# Patient Record
Sex: MALE | Race: WHITE | NOT HISPANIC OR LATINO | Employment: FULL TIME | URBAN - METROPOLITAN AREA
[De-identification: names, ages, dates, MRNs, and addresses within clinical notes are randomized per-mention and may not be internally consistent; named-entity substitution may affect disease eponyms.]

---

## 2017-01-06 ENCOUNTER — OFFICE VISIT (OUTPATIENT)
Dept: FAMILY MEDICINE | Facility: CLINIC | Age: 52
End: 2017-01-06
Payer: COMMERCIAL

## 2017-01-06 VITALS
DIASTOLIC BLOOD PRESSURE: 90 MMHG | WEIGHT: 201.94 LBS | RESPIRATION RATE: 16 BRPM | SYSTOLIC BLOOD PRESSURE: 138 MMHG | HEIGHT: 66 IN | HEART RATE: 70 BPM | BODY MASS INDEX: 32.45 KG/M2

## 2017-01-06 DIAGNOSIS — I10 ESSENTIAL HYPERTENSION: ICD-10-CM

## 2017-01-06 DIAGNOSIS — M50.00 CERVICAL DISC DISEASE WITH MYELOPATHY: ICD-10-CM

## 2017-01-06 DIAGNOSIS — E78.5 HYPERLIPIDEMIA, UNSPECIFIED HYPERLIPIDEMIA TYPE: Primary | ICD-10-CM

## 2017-01-06 LAB
ALT SERPL W/O P-5'-P-CCNC: 27 U/L
ANION GAP SERPL CALC-SCNC: 10 MMOL/L
BUN SERPL-MCNC: 20 MG/DL
CALCIUM SERPL-MCNC: 9.9 MG/DL
CHLORIDE SERPL-SCNC: 106 MMOL/L
CHOLEST/HDLC SERPL: 5.7 {RATIO}
CO2 SERPL-SCNC: 24 MMOL/L
CREAT SERPL-MCNC: 1.1 MG/DL
EST. GFR  (AFRICAN AMERICAN): >60 ML/MIN/1.73 M^2
EST. GFR  (NON AFRICAN AMERICAN): >60 ML/MIN/1.73 M^2
GLUCOSE SERPL-MCNC: 91 MG/DL
HDL/CHOLESTEROL RATIO: 17.4 %
HDLC SERPL-MCNC: 195 MG/DL
HDLC SERPL-MCNC: 34 MG/DL
LDLC SERPL CALC-MCNC: 136 MG/DL
NONHDLC SERPL-MCNC: 161 MG/DL
POTASSIUM SERPL-SCNC: 4.2 MMOL/L
SODIUM SERPL-SCNC: 140 MMOL/L
TRIGL SERPL-MCNC: 125 MG/DL

## 2017-01-06 PROCEDURE — 1159F MED LIST DOCD IN RCRD: CPT | Mod: S$GLB,,, | Performed by: FAMILY MEDICINE

## 2017-01-06 PROCEDURE — 80061 LIPID PANEL: CPT

## 2017-01-06 PROCEDURE — 3080F DIAST BP >= 90 MM HG: CPT | Mod: S$GLB,,, | Performed by: FAMILY MEDICINE

## 2017-01-06 PROCEDURE — 84460 ALANINE AMINO (ALT) (SGPT): CPT

## 2017-01-06 PROCEDURE — 36415 COLL VENOUS BLD VENIPUNCTURE: CPT | Mod: S$GLB,,, | Performed by: FAMILY MEDICINE

## 2017-01-06 PROCEDURE — 99999 PR PBB SHADOW E&M-EST. PATIENT-LVL III: CPT | Mod: PBBFAC,,, | Performed by: FAMILY MEDICINE

## 2017-01-06 PROCEDURE — 80048 BASIC METABOLIC PNL TOTAL CA: CPT

## 2017-01-06 PROCEDURE — 99214 OFFICE O/P EST MOD 30 MIN: CPT | Mod: 25,S$GLB,, | Performed by: FAMILY MEDICINE

## 2017-01-06 PROCEDURE — 3075F SYST BP GE 130 - 139MM HG: CPT | Mod: S$GLB,,, | Performed by: FAMILY MEDICINE

## 2017-01-06 RX ORDER — ATORVASTATIN CALCIUM 20 MG/1
20 TABLET, FILM COATED ORAL DAILY
Qty: 30 TABLET | Refills: 5 | Status: SHIPPED | OUTPATIENT
Start: 2017-01-06 | End: 2017-03-23 | Stop reason: SDUPTHER

## 2017-01-06 RX ORDER — PROPRANOLOL HYDROCHLORIDE 80 MG/1
80 CAPSULE, EXTENDED RELEASE ORAL DAILY
Qty: 30 CAPSULE | Refills: 5 | Status: SHIPPED | OUTPATIENT
Start: 2017-01-06 | End: 2017-03-23 | Stop reason: SDUPTHER

## 2017-01-06 RX ORDER — FENOFIBRIC ACID 135 MG/1
1 CAPSULE, DELAYED RELEASE ORAL DAILY
Qty: 30 CAPSULE | Refills: 5 | Status: SHIPPED | OUTPATIENT
Start: 2017-01-06 | End: 2017-03-23 | Stop reason: SDUPTHER

## 2017-01-06 RX ORDER — VALSARTAN 80 MG/1
80 TABLET ORAL DAILY
Qty: 30 TABLET | Refills: 5 | Status: SHIPPED | OUTPATIENT
Start: 2017-01-06 | End: 2017-03-23 | Stop reason: SDUPTHER

## 2017-01-06 NOTE — MR AVS SNAPSHOT
[address] [address]  Phone: [phone]  Fax: [phone]                  Joel Deluca   2017 11:00 AM   Office Visit    Description:  Male : 1965   Provider:  Otis Martinez MD   Department:  [facility]           Reason for Visit     Follow-up           Diagnoses this Visit        Comments    Hyperlipidemia, unspecified hyperlipidemia type    -  Primary     Essential hypertension         Cervical disc disease with myelopathy                To Do List           Future Appointments        Provider Department Dept Phone    2017 1:30 PM Otis Martinez MD [facility] [phone]      Goals (5 Years of Data)     None      Follow-Up and Disposition     Return in about 6 months (around 2017).       These Medications        Disp Refills Start End    valsartan (DIOVAN) 80 MG tablet 30 tablet 5 2017     Take 1 tablet (80 mg total) by mouth once daily. - Oral    Pharmacy: [place] [id number] [place] [address] Ph #: [phone]       atorvastatin (LIPITOR) 20 MG tablet 30 tablet 5 2017     Take 1 tablet (20 mg total) by mouth once daily. - Oral    Pharmacy: [place] [address] [place] [address] Ph #: [phone]       fenofibric acid (FIBRICOR) 135 mg CpDR 30 capsule 5 2017     Take 1 capsule (135 mg total) by mouth once daily. - Oral    Pharmacy: [place] [address] [place] [address] Ph #: [phone]       propranolol (INDERAL LA) 80 MG 24 hr capsule 30 capsule 2017     Take 1 capsule (80 mg total) by mouth once daily. - Oral    Pharmacy: [place] [address] [place] [address] Ph #: [phone]         [facility]     [facility] Care [facility] -  Assistance  Registered nurses in the [facility] Call Center provide clinical advisement, health education,  appointment booking, and other advisory services.  Call for this free service at [phone].             Medications           Message regarding Medications     Verify the changes and/or additions to your medication regime listed below are the same as discussed with your clinician today.  If any of these changes or additions are incorrect, please notify your healthcare provider.        CHANGE how you are taking these medications     Start Taking Instead of    valsartan (DIOVAN) 80 MG tablet valsartan (DIOVAN) 80 MG tablet    Dosage:  Take 1 tablet (80 mg total) by mouth once daily. Dosage:  TAKE ONE TABLET BY MOUTH ONE TIME DAILY    Reason for Change:  Reorder     atorvastatin (LIPITOR) 20 MG tablet atorvastatin (LIPITOR) 20 MG tablet    Dosage:  Take 1 tablet (20 mg total) by mouth once daily. Dosage:  TAKE ONE TABLET BY MOUTH ONE TIME DAILY    Reason for Change:  Reorder     fenofibric acid (FIBRICOR) 135 mg CpDR fenofibric acid (FIBRICOR) 135 mg CpDR    Dosage:  Take 1 capsule (135 mg total) by mouth once daily. Dosage:  TAKE ONE CAPSULE BY MOUTH DAILY    Reason for Change:  Reorder     propranolol (INDERAL LA) 80 MG 24 hr capsule propranolol (INDERAL LA) 80 MG 24 hr capsule    Dosage:  Take 1 capsule (80 mg total) by mouth once daily. Dosage:  TAKE ONE CAPSULE BY MOUTH ONE TIME DAILY     Reason for Change:  Reorder            Verify that the below list of medications is an accurate representation of the medications you are currently taking.  If none reported, the list may be blank. If incorrect, please contact your healthcare provider. Carry this list with you in case of emergency.           Current Medications     atorvastatin (LIPITOR) 20 MG tablet Take 1 tablet (20 mg total) by mouth once daily.    fenofibric acid (FIBRICOR) 135 mg CpDR Take 1 capsule (135 mg total) by mouth once daily.    propranolol (INDERAL LA) 80 MG 24 hr capsule Take 1 capsule (80 mg total) by mouth once daily.    valsartan (DIOVAN)  "80 MG tablet Take 1 tablet (80 mg total) by mouth once daily.           Clinical Reference Information           Vital Signs - Last Recorded  Most recent update: 1/6/2017 11:00 AM by Delmar Chu LPN    BP Pulse Resp Ht Wt BMI    (!) 138/90 (BP Location: Left arm, Patient Position: Sitting, BP Method: Manual) 70 16 5' 6" (1.676 m) 91.6 kg (201 lb 15.1 oz) 32.59 kg/m2      Blood Pressure          Most Recent Value    BP  (!)  138/90      Allergies as of 1/6/2017     No Known Allergies      Immunizations Administered on Date of Encounter - 1/6/2017     None      Orders Placed During Today's Visit     Future Labs/Procedures Expected by Expires    ALT (SGPT)  1/6/2017 1/6/2018    Basic metabolic panel  1/6/2017 1/6/2018    Lipid panel  1/6/2017 1/6/2018      "

## 2017-01-06 NOTE — PROGRESS NOTES
Pt is a 51 y.o. male who presents for check up for   Encounter Diagnoses   Name Primary?    Hyperlipidemia     Hypertension    . Doing well on current meds. Denies any side effects. Prevention is up to date.  He refuses the flu shot    HPI: White male here for checkup today. His blood pressure is running well. He denies side effects such as dry cough, lightheadedness, fatigue. His cholesterol medications were well tolerated. He does not have any side effects such as myalgias. He denies fever chills weight loss or weight gain. He denies chest pain.  Patient recently had blood work done through work.  Lab results were reviewed.  This will be scanned into the computer.  Having more neck pain.  History of cervical fusion.  Gets paresthesia in right hand. pain comes and goes.  Very painful when he lies down.  He is tolerating it and does not want anything done at this time.      ROS:   Gen.: No fever, chills, weight loss, weight gain  HEENT: No headaches, sinus congestion, sore throat, change in vision  CV: No chest pain  RESP: No shortness of breath, wheezing, cough  GI: No change in bowel habits, no diarrhea, no abdominal pain, no hematochezia  : No dysuria, frequency  MSK: Occasional left hip pain  NEURO: No numbness, weakness  ENDO: No polyuria, polydipsia, heat or cold intolerance    PMH, PSH, ALLERGIES, SH, FH reviewed in office note on 4/10/11  Medications reconciled in the nurse's notes    PHYSICAL EXAM;   Vitals:    01/06/17 1059   BP: (!) 138/90   Pulse: 70   Resp: 16     APPEARANCE: Well nourished, well developed, in no acute distress.   HEAD: Normocephalic, atraumatic.  EYES: PERRL. EOMI.   EARS: TM's intact. Light reflex normal. No retraction or perforation.  Very hard of hearing   NOSE: Mucosa pink. Airway clear.  MOUTH & THROAT: No tonsillar enlargement. No pharyngeal erythema or exudate. No stridor.  NECK: Supple.  No carotid bruits.  No JVD   CHEST: Lungs clear to auscultation.  CARDIOVASCULAR:  Normal S1, S2. No rubs, murmurs or gallops.  MUSCULOSKELETAL: No clubbing cyanosis or edema  SKIN: No rashes or pigmented moles.  NEUROLOGIC:    Cranial Nerves: II-XII grossly intact.   Motor: No focal deficits   MENTAL STATUS: Normal orientation to person, place and time, normal affect, normal flow thought, normal memory.    No results found for: LDLCALCTrig 339  HDL 27  LDLc 119    ASSESSMENT/PLAN:    HTN -   Encouraged patient to avoid table salt and try to follow a 2 g sodium diet  Continue propranolol 80 mg daily, Diovan 80 mg p.o. daily.  Walk 20 minutes per day.  Try to lose weight.    Dyslipidemia-  Low fat diet. Avoid sweets. A 10 pound weight loss by the next visit as a  good goal. Increase consumption of fruits and vegetables, fish and chicken.  Continue Lipitor 10 mg daily, Trilipix 135 mg p.o. daily.    Hyperlipidemia, unspecified hyperlipidemia type  -     ALT (SGPT); Future; Expected date: 1/6/17  -     Basic metabolic panel; Future; Expected date: 1/6/17  -     Lipid panel; Future; Expected date: 1/6/17  -     atorvastatin (LIPITOR) 20 MG tablet; Take 1 tablet (20 mg total) by mouth once daily.  Dispense: 30 tablet; Refill: 5  -     fenofibric acid (FIBRICOR) 135 mg CpDR; Take 1 capsule (135 mg total) by mouth once daily.  Dispense: 30 capsule; Refill: 5    Essential hypertension  -     valsartan (DIOVAN) 80 MG tablet; Take 1 tablet (80 mg total) by mouth once daily.  Dispense: 30 tablet; Refill: 5  -     propranolol (INDERAL LA) 80 MG 24 hr capsule; Take 1 capsule (80 mg total) by mouth once daily.  Dispense: 30 capsule; Refill: 5    Cervical disc disease with myelopathy      Next appointment will be in 6 months.

## 2017-03-23 DIAGNOSIS — E78.5 HYPERLIPIDEMIA, UNSPECIFIED HYPERLIPIDEMIA TYPE: ICD-10-CM

## 2017-03-23 DIAGNOSIS — I10 ESSENTIAL HYPERTENSION: ICD-10-CM

## 2017-03-24 RX ORDER — ATORVASTATIN CALCIUM 20 MG/1
TABLET, FILM COATED ORAL
Qty: 30 TABLET | Refills: 5 | Status: SHIPPED | OUTPATIENT
Start: 2017-03-24 | End: 2017-07-05 | Stop reason: SDUPTHER

## 2017-03-24 RX ORDER — VALSARTAN 80 MG/1
TABLET ORAL
Qty: 30 TABLET | Refills: 5 | Status: SHIPPED | OUTPATIENT
Start: 2017-03-24 | End: 2017-07-05 | Stop reason: SDUPTHER

## 2017-03-24 RX ORDER — PROPRANOLOL HYDROCHLORIDE 80 MG/1
CAPSULE, EXTENDED RELEASE ORAL
Qty: 30 CAPSULE | Refills: 5 | Status: SHIPPED | OUTPATIENT
Start: 2017-03-24 | End: 2017-07-05 | Stop reason: SDUPTHER

## 2017-03-24 RX ORDER — FENOFIBRIC ACID 135 MG/1
CAPSULE, DELAYED RELEASE ORAL
Qty: 30 CAPSULE | Refills: 5 | Status: SHIPPED | OUTPATIENT
Start: 2017-03-24 | End: 2017-07-05 | Stop reason: SDUPTHER

## 2017-05-23 ENCOUNTER — OFFICE VISIT (OUTPATIENT)
Dept: FAMILY MEDICINE | Facility: CLINIC | Age: 52
End: 2017-05-23
Payer: COMMERCIAL

## 2017-05-23 VITALS
SYSTOLIC BLOOD PRESSURE: 140 MMHG | HEIGHT: 66 IN | BODY MASS INDEX: 32.47 KG/M2 | WEIGHT: 202 LBS | DIASTOLIC BLOOD PRESSURE: 90 MMHG | HEART RATE: 68 BPM | RESPIRATION RATE: 20 BRPM

## 2017-05-23 DIAGNOSIS — H49.11 FOURTH NERVE PALSY, RIGHT: Primary | ICD-10-CM

## 2017-05-23 LAB
ALBUMIN SERPL BCP-MCNC: 4.2 G/DL
ALP SERPL-CCNC: 54 U/L
ALT SERPL W/O P-5'-P-CCNC: 24 U/L
ANION GAP SERPL CALC-SCNC: 11 MMOL/L
AST SERPL-CCNC: 22 U/L
BASOPHILS # BLD AUTO: 0.04 K/UL
BASOPHILS NFR BLD: 0.6 %
BILIRUB SERPL-MCNC: 0.4 MG/DL
BUN SERPL-MCNC: 19 MG/DL
CALCIUM SERPL-MCNC: 9.8 MG/DL
CHLORIDE SERPL-SCNC: 106 MMOL/L
CO2 SERPL-SCNC: 25 MMOL/L
CREAT SERPL-MCNC: 1.2 MG/DL
DIFFERENTIAL METHOD: ABNORMAL
EOSINOPHIL # BLD AUTO: 0.2 K/UL
EOSINOPHIL NFR BLD: 3.3 %
ERYTHROCYTE [DISTWIDTH] IN BLOOD BY AUTOMATED COUNT: 12.7 %
ERYTHROCYTE [SEDIMENTATION RATE] IN BLOOD BY WESTERGREN METHOD: 2 MM/HR
EST. GFR  (AFRICAN AMERICAN): >60 ML/MIN/1.73 M^2
EST. GFR  (NON AFRICAN AMERICAN): >60 ML/MIN/1.73 M^2
GLUCOSE SERPL-MCNC: 102 MG/DL
HCT VFR BLD AUTO: 38.7 %
HGB BLD-MCNC: 13.3 G/DL
LYMPHOCYTES # BLD AUTO: 3 K/UL
LYMPHOCYTES NFR BLD: 43.3 %
MCH RBC QN AUTO: 29.8 PG
MCHC RBC AUTO-ENTMCNC: 34.4 %
MCV RBC AUTO: 87 FL
MONOCYTES # BLD AUTO: 0.6 K/UL
MONOCYTES NFR BLD: 9.3 %
NEUTROPHILS # BLD AUTO: 3 K/UL
NEUTROPHILS NFR BLD: 43.5 %
PLATELET # BLD AUTO: 319 K/UL
PMV BLD AUTO: 9 FL
POTASSIUM SERPL-SCNC: 3.9 MMOL/L
PROT SERPL-MCNC: 7.8 G/DL
RBC # BLD AUTO: 4.47 M/UL
SODIUM SERPL-SCNC: 142 MMOL/L
WBC # BLD AUTO: 6.88 K/UL

## 2017-05-23 PROCEDURE — 85651 RBC SED RATE NONAUTOMATED: CPT

## 2017-05-23 PROCEDURE — 3080F DIAST BP >= 90 MM HG: CPT | Mod: S$GLB,,, | Performed by: FAMILY MEDICINE

## 2017-05-23 PROCEDURE — 99999 PR PBB SHADOW E&M-EST. PATIENT-LVL III: CPT | Mod: PBBFAC,,, | Performed by: FAMILY MEDICINE

## 2017-05-23 PROCEDURE — 1160F RVW MEDS BY RX/DR IN RCRD: CPT | Mod: S$GLB,,, | Performed by: FAMILY MEDICINE

## 2017-05-23 PROCEDURE — 3077F SYST BP >= 140 MM HG: CPT | Mod: S$GLB,,, | Performed by: FAMILY MEDICINE

## 2017-05-23 PROCEDURE — 99214 OFFICE O/P EST MOD 30 MIN: CPT | Mod: S$GLB,,, | Performed by: FAMILY MEDICINE

## 2017-05-23 PROCEDURE — 80053 COMPREHEN METABOLIC PANEL: CPT

## 2017-05-23 PROCEDURE — 36415 COLL VENOUS BLD VENIPUNCTURE: CPT | Mod: S$GLB,,, | Performed by: FAMILY MEDICINE

## 2017-05-23 PROCEDURE — 85025 COMPLETE CBC W/AUTO DIFF WBC: CPT

## 2017-05-23 NOTE — PROGRESS NOTES
Subjective:       Patient ID: Joel Deluca is a 52 y.o. male.    Chief Complaint: Diplopia (symptoms started on friday; vision is worse when pts wakes up)    Patient knows that he had double vision starting Friday.  When he becomes tired or fatigued worsens.  Looking at things up close causes double vision.  He covers one eye he can see normally.  He denies headaches, trouble swallowing, fever, chills.  He went to the eye doctor yesterday and was told he had a fourth nerve palsy.  He is here for further evaluation.      Review of Systems   Constitutional: Negative for activity change, chills, diaphoresis, fatigue, fever and unexpected weight change.   HENT: Positive for hearing loss. Negative for congestion, nosebleeds, postnasal drip, sinus pressure, sore throat, trouble swallowing and voice change.    Eyes: Positive for visual disturbance. Negative for photophobia.   Respiratory: Negative for apnea, cough, choking, chest tightness and shortness of breath.    Cardiovascular: Negative for chest pain, palpitations and leg swelling.   Gastrointestinal: Negative for abdominal pain and blood in stool.   Endocrine: Negative for cold intolerance, heat intolerance, polydipsia and polyphagia.   Genitourinary: Negative for decreased urine volume, difficulty urinating and dysuria.   Musculoskeletal: Negative for arthralgias, back pain and joint swelling.   Skin: Negative for color change, pallor and rash.   Neurological: Negative for dizziness, weakness, numbness and headaches.   Hematological: Negative for adenopathy. Does not bruise/bleed easily.   Psychiatric/Behavioral: Negative for behavioral problems, decreased concentration, dysphoric mood and sleep disturbance. The patient is not nervous/anxious.        Objective:      Vitals:    05/23/17 0900   BP: (!) 140/90   Pulse: 68   Resp: 20     Physical Exam   Constitutional: He is oriented to person, place, and time. He appears well-developed and well-nourished.   HENT:    Head: Normocephalic and atraumatic.   Right Ear: Tympanic membrane, external ear and ear canal normal.   Left Ear: Tympanic membrane, external ear and ear canal normal.   Nose: Nose normal.   Mouth/Throat: Oropharynx is clear and moist.   Eyes: Conjunctivae and EOM are normal. Pupils are equal, round, and reactive to light.   Fundoscopic exam:       The right eye shows no exudate and no papilledema.        The left eye shows no exudate and no papilledema.   Neck: Normal range of motion. Neck supple. No tracheal deviation present. No thyromegaly present.   Cardiovascular: Normal rate, regular rhythm, normal heart sounds and intact distal pulses.  Exam reveals no gallop.    No murmur heard.  Pulmonary/Chest: Effort normal and breath sounds normal.   Abdominal: Soft. Bowel sounds are normal. Hernia confirmed negative in the right inguinal area and confirmed negative in the left inguinal area.   Musculoskeletal: Normal range of motion. He exhibits no edema.   Neurological: He is alert and oriented to person, place, and time. He has normal reflexes.   Skin: Skin is warm and dry.   Psychiatric: He has a normal mood and affect. His behavior is normal. Judgment and thought content normal.       Assessment:       1. Fourth nerve palsy, right        Plan:   Joel was seen today for diplopia.    Diagnoses and all orders for this visit:    Fourth nerve palsy, right  I agree with the eye specialist.  It looks like a fourth nerve palsy even though I do not see it when he does eye range of motion.  This seems to worsen when he has to look at something close as I has to accommodate.  He seems to be improving already so hopefully this will be short-lived.  -     MRI Brain With Contrast; Future-knee to rule out any mass type lesion causing these symptoms.  -     Comprehensive metabolic panel; Future  -     CBC auto differential; Future  -     Sedimentation rate, manual; Future  -     Ambulatory referral to  Neurology    Monitor  Should get better slowly  Continue current medications.

## 2017-05-24 ENCOUNTER — TELEPHONE (OUTPATIENT)
Dept: FAMILY MEDICINE | Facility: CLINIC | Age: 52
End: 2017-05-24

## 2017-05-24 NOTE — TELEPHONE ENCOUNTER
----- Message from Otis Martinez MD sent at 5/24/2017  1:26 PM CDT -----  Tell patient that labs look good.

## 2017-05-26 ENCOUNTER — HOSPITAL ENCOUNTER (OUTPATIENT)
Dept: RADIOLOGY | Facility: HOSPITAL | Age: 52
Discharge: HOME | End: 2017-05-26
Attending: FAMILY MEDICINE
Payer: COMMERCIAL

## 2017-05-26 DIAGNOSIS — H49.11 FOURTH NERVE PALSY, RIGHT: ICD-10-CM

## 2017-05-26 PROCEDURE — 25500020 PHARM REV CODE 255: Performed by: FAMILY MEDICINE

## 2017-05-26 PROCEDURE — 70553 MRI BRAIN STEM W/O & W/DYE: CPT | Mod: TC

## 2017-05-26 PROCEDURE — 70553 MRI BRAIN STEM W/O & W/DYE: CPT | Mod: 26,,, | Performed by: RADIOLOGY

## 2017-05-26 PROCEDURE — A9585 GADOBUTROL INJECTION: HCPCS | Performed by: FAMILY MEDICINE

## 2017-05-26 RX ORDER — GADOBUTROL 604.72 MG/ML
9 INJECTION INTRAVENOUS
Status: COMPLETED | OUTPATIENT
Start: 2017-05-26 | End: 2017-05-26

## 2017-05-26 RX ADMIN — GADOBUTROL 9 ML: 604.72 INJECTION INTRAVENOUS at 12:05

## 2017-05-29 DIAGNOSIS — H49.11 FOURTH NERVE PALSY, RIGHT: ICD-10-CM

## 2017-05-29 DIAGNOSIS — D18.02 CAVERNOUS HEMANGIOMA OF BRAIN: Primary | ICD-10-CM

## 2017-05-29 NOTE — PROGRESS NOTES
I discussed the situation with the patient.  I talked to Dr. Nicole.  She thinks patient needs to see neurosurgery as soon as possible.  Referral made.  Need to call neurosurgery and get an appointment.

## 2017-05-31 ENCOUNTER — TELEPHONE (OUTPATIENT)
Dept: NEUROSURGERY | Facility: CLINIC | Age: 52
End: 2017-05-31

## 2017-05-31 ENCOUNTER — OFFICE VISIT (OUTPATIENT)
Dept: NEUROSURGERY | Facility: CLINIC | Age: 52
End: 2017-05-31
Payer: COMMERCIAL

## 2017-05-31 VITALS
BODY MASS INDEX: 32.35 KG/M2 | TEMPERATURE: 99 F | WEIGHT: 201.31 LBS | HEIGHT: 66 IN | SYSTOLIC BLOOD PRESSURE: 145 MMHG | HEART RATE: 57 BPM | DIASTOLIC BLOOD PRESSURE: 94 MMHG

## 2017-05-31 DIAGNOSIS — I10 ESSENTIAL HYPERTENSION: ICD-10-CM

## 2017-05-31 DIAGNOSIS — M50.00 CERVICAL DISC DISEASE WITH MYELOPATHY: ICD-10-CM

## 2017-05-31 DIAGNOSIS — M50.00 CERVICAL DISC DISEASE WITH MYELOPATHY: Primary | ICD-10-CM

## 2017-05-31 DIAGNOSIS — Q28.3 CEREBRAL CAVERNOUS MALFORMATION: Primary | ICD-10-CM

## 2017-05-31 PROCEDURE — 99999 PR PBB SHADOW E&M-EST. PATIENT-LVL III: CPT | Mod: PBBFAC,,, | Performed by: NEUROLOGICAL SURGERY

## 2017-05-31 PROCEDURE — 99204 OFFICE O/P NEW MOD 45 MIN: CPT | Mod: S$GLB,,, | Performed by: NEUROLOGICAL SURGERY

## 2017-05-31 NOTE — PROGRESS NOTES
"History & Physical      05/31/2017    Chief Complaint   Patient presents with    Consult       History of Present Illness:  Joel Deluca is a 52 y.o. patient with history of a 4th nerve palsy.  Patient is referred to me by Dr. Otis Martinez.  Patient reports that two weeks ago he woke up with double vision and had a feeling of "being wet" on his right hemibody.  Patient denies weakness, speech problems, nausea, vomit, trouble with his gait and hearing loss. 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     Review of patient's allergies indicates:  No Known Allergies    Current Outpatient Prescriptions   Medication Sig Dispense Refill    atorvastatin (LIPITOR) 20 MG tablet TAKE 1 TABLET BY MOUTH EVERY DAY 30 tablet 5    fenofibric acid (FIBRICOR) 135 mg CpDR TAKE ONE CAPSULE BY MOUTH EVERY DAY 30 capsule 5    propranolol (INDERAL LA) 80 MG 24 hr capsule TAKE ONE CAPSULE BY MOUTH EVERY DAY 30 capsule 5    valsartan (DIOVAN) 80 MG tablet TAKE 1 TABLET BY MOUTH EVERY DAY 30 tablet 5     No current facility-administered medications for this visit.        Past Medical History:   Diagnosis Date    Hyperlipidemia     Hypertension        Past Surgical History:   Procedure Laterality Date    ANTERIOR CERVICAL DISCECTOMY W/ FUSION         Family History   Problem Relation Age of Onset    Hypertension Mother     Heart disease Father     Hypertension Father        Social History   Substance Use Topics    Smoking status: Never Smoker    Smokeless tobacco: Never Used    Alcohol use Yes        Review of Systems:  Review of Systems   Constitutional: Negative for activity change.   HENT: Negative for congestion.    Eyes: Negative for discharge.   Respiratory: Negative for apnea.    Cardiovascular: Negative for chest pain.   Gastrointestinal: Negative for abdominal distention.   Endocrine: Negative for cold intolerance.   Genitourinary: Negative for difficulty urinating.   Musculoskeletal: Negative " "for arthralgias.   Allergic/Immunologic: Negative for environmental allergies.   Neurological: Negative for dizziness, seizures, facial asymmetry, weakness, light-headedness and headaches.   Psychiatric/Behavioral: Negative for agitation.       Vital Signs (Most Recent)  Temp: 98.5 °F (36.9 °C) (05/31/17 0819)  Pulse: (!) 57 (05/31/17 0819)  BP: (!) 145/94 (05/31/17 0819)  5' 6" (1.676 m)  91.3 kg (201 lb 4.8 oz)       Physical Exam:  Physical Exam:    Constitutional: He appears well-developed.     Eyes: Pupils are equal, round, and reactive to light. EOM are normal. Right eye exhibits no discharge. Left eye exhibits no discharge.     Cardiovascular: Normal rate, normal pulses, intact distal pulses, normal distal pulses and no edema.     Abdominal: Soft.     Skin: Skin displays no rash on trunk and no rash on extremities.     Psych/Behavior: He is alert. He is oriented to person, place, and time.     Musculoskeletal: Gait is normal.        Neck: There is no tenderness.        Back: Range of motion is full.        Right Upper Extremities: Range of motion is full. Muscle strength is 5/5. Tone is normal.        Left Upper Extremities: Range of motion is full. Muscle strength is 5/5. Tone is normal.       Right Lower Extremities: Range of motion is full. Muscle strength is 5/5. Tone is normal.        Left Lower Extremities: Range of motion is full. Muscle strength is 5/5. Tone is normal.     Neurological:        Coordination: He has a normal Romberg Test and normal tandem walking coordination.        Sensory: There is no (subjective "wet" feeling on his R hemibody) sensory deficit in the trunk. There is no sensory deficit in the extremities.        DTRs: DTRs are DTRS NORMAL AND SYMMETRICnormal and symmetric. Tricep reflexes are 2+ on the right side and 2+ on the left side. Bicep reflexes are 2+ on the right side and 2+ on the left side. Brachioradialis reflexes are 2+ on the right side and 2+ on the left side. Patellar " reflexes are 2+ on the right side and 2+ on the left side. Achilles reflexes are 2+ on the right side and 2+ on the left side. He displays no Babinski's sign on the right side. He displays no Babinski's sign on the left side.        Cranial nerves: Cranial nerve(s) II, III, IV (right CN IV partial palsy), V, VI, VII, VIII, IX, X, XI and XII are intact.         Laboratory  CBC: Reviewed  BMP: Reviewed    Diagnostic Results:  MRI: Reviewed   Left dorsal midbrain cavernous malformation with adjacent parenchymal T2/FLAIR signal hyperintensity.  Of note, this is in the region of the left trochlear nerve nucleus which could account for the patient's right trochlear nerve palsy, though the left oculomotor nucleus and left trigeminal nerve mesencephalic nucleus are also in close proximity to this region.    Suggestion of developmental venous anomaly in the dorsal kim associated with this cavernous malformation.    Question of additional smaller adjacent cavernous malformation in the left superior cerebellar peduncle versus single contiguous lesion.    ASSESSMENT/PLAN:       ICD-10-CM ICD-9-CM   1. Cerebral cavernous malformation Q28.3 228.02   2. Cervical disc disease with myelopathy M50.00 722.71   3. Essential hypertension I10 401.9       PLAN:  1.- I spent 35 minutes with the patient, 50% of time in counseling. I went over the natural history of cavernomas of the brain stem, explained the 1-3% risk of hemorrhage per year with very low risk of deficits. I explained that because of the location of his, I would recommend conservative treatment with imaging follow ups. I explained that the roll of surgery is left on patients that have recurrent hemorrhages with progressive neurological deficits. Considering that this patient is back to his baseline neurological function, we will follow him conservatively.  2.- Will repeat MRI Brain without contrast in 6 months to make sure the size of the cavernoma is stable. From then, we  will do MRIs q 1 year. We will keep him posted of the results of his MRI at the time.   3.- Will follow up with PCP for BP control.   4.- Will update MRI of the C spine once patient going for MRI of the brain, to evaluate the extruded disk / adjacent level disease at C6-7. Patient has followed with Dr. Bolaños, who did his first C spine surgery and would like to go back to him to update his MRI of the C spine.         Ray was seen today for consult.    Diagnoses and all orders for this visit:    Cerebral cavernous malformation    Cervical disc disease with myelopathy    Essential hypertension

## 2017-05-31 NOTE — LETTER
May 31, 2017      Otis Martinez MD  [address] [place] [address]           [facility] 7th Fl  [address]  [facility] [address]  Phone: [phone]          Patient: Joel Deluca   MR Number: [id number]   YOB: 1965   Date of Visit: 5/31/2017       Dear Dr. Otis Martinez:    Thank you for referring Joel Deluca to me for evaluation. Attached you will find relevant portions of my assessment and plan of care.    If you have questions, please do not hesitate to call me. I look forward to following Joel Deluca along with you.    Sincerely,    Avery Mejia MD    Enclosure  CC:  No Recipients    If you would like to receive this communication electronically, please contact [email or web address] or [phone] to request more information on [employer] Link access.    For providers and/or their staff who would like to refer a patient to [facility], please contact us through our one-stop-shop provider referral line, [facility], at [phone].    If you feel you have received this communication in error or would no longer like to receive these types of communications, please e-mail [email or web address]

## 2017-06-20 ENCOUNTER — PATIENT OUTREACH (OUTPATIENT)
Dept: ADMINISTRATIVE | Facility: HOSPITAL | Age: 52
End: 2017-06-20

## 2017-06-20 NOTE — LETTER
June 20, 2017    Joel Deluca  [address] [place] [address]             [facility]  [address]  [facility] [address]  Phone: [phone] Dear Mr. Deluca:    [facility] is committed to your overall health.  To help you get the most out of each of your visits, we will review your information to make sure you are up to date on all of your recommended tests and/or procedures.      Dr. Martinez has found that you may be due for a tetanus vaccine and a colonoscopy.     If you have had any of the above done at another facility, please bring the records or information with you so that your record at [facility] will be complete.  If you would like to schedule any of these, please contact me.    If you are currently taking medication, please bring it with you to your appointment for review.    If you have any questions or concerns, please don't hesitate to call.    Sincerely,        Ernestina Hou LPN Clinical Care Coordinator  [facility]  [phone]

## 2017-07-05 ENCOUNTER — OFFICE VISIT (OUTPATIENT)
Dept: FAMILY MEDICINE | Facility: CLINIC | Age: 52
End: 2017-07-05
Payer: COMMERCIAL

## 2017-07-05 VITALS
BODY MASS INDEX: 32.95 KG/M2 | HEIGHT: 66 IN | SYSTOLIC BLOOD PRESSURE: 124 MMHG | WEIGHT: 205 LBS | DIASTOLIC BLOOD PRESSURE: 78 MMHG | RESPIRATION RATE: 18 BRPM | HEART RATE: 74 BPM

## 2017-07-05 DIAGNOSIS — M50.00 CERVICAL DISC DISEASE WITH MYELOPATHY: ICD-10-CM

## 2017-07-05 DIAGNOSIS — Q28.3 CEREBRAL CAVERNOUS MALFORMATION: ICD-10-CM

## 2017-07-05 DIAGNOSIS — E78.5 HYPERLIPIDEMIA, UNSPECIFIED HYPERLIPIDEMIA TYPE: ICD-10-CM

## 2017-07-05 DIAGNOSIS — I10 ESSENTIAL HYPERTENSION: Primary | ICD-10-CM

## 2017-07-05 PROCEDURE — 99999 PR PBB SHADOW E&M-EST. PATIENT-LVL III: CPT | Mod: PBBFAC,,, | Performed by: FAMILY MEDICINE

## 2017-07-05 PROCEDURE — 99214 OFFICE O/P EST MOD 30 MIN: CPT | Mod: S$GLB,,, | Performed by: FAMILY MEDICINE

## 2017-07-05 RX ORDER — VALSARTAN 80 MG/1
80 TABLET ORAL DAILY
Qty: 30 TABLET | Refills: 5 | Status: SHIPPED | OUTPATIENT
Start: 2017-07-05 | End: 2018-02-07 | Stop reason: SDUPTHER

## 2017-07-05 RX ORDER — ATORVASTATIN CALCIUM 20 MG/1
20 TABLET, FILM COATED ORAL DAILY
Qty: 30 TABLET | Refills: 5 | Status: SHIPPED | OUTPATIENT
Start: 2017-07-05 | End: 2018-02-07 | Stop reason: SDUPTHER

## 2017-07-05 RX ORDER — FENOFIBRIC ACID 135 MG/1
1 CAPSULE, DELAYED RELEASE ORAL DAILY
Qty: 30 CAPSULE | Refills: 5 | Status: SHIPPED | OUTPATIENT
Start: 2017-07-05 | End: 2018-02-07 | Stop reason: SDUPTHER

## 2017-07-05 RX ORDER — PROPRANOLOL HYDROCHLORIDE 80 MG/1
80 CAPSULE, EXTENDED RELEASE ORAL DAILY
Qty: 30 CAPSULE | Refills: 5 | Status: SHIPPED | OUTPATIENT
Start: 2017-07-05 | End: 2018-02-07 | Stop reason: SDUPTHER

## 2017-07-05 NOTE — PROGRESS NOTES
Pt is a 52 y.o. male who presents for check up for   Encounter Diagnoses   Name Primary?    Hyperlipidemia     Hypertension    . Doing well on current meds. Denies any side effects. Prevention is up to date.  He refuses the flu shot    HPI: White male here for checkup today. His blood pressure is running well. He denies side effects such as dry cough, lightheadedness, fatigue. His cholesterol medications were well tolerated. He does not have any side effects such as myalgias. He denies fever chills weight loss or weight gain. He denies chest pain.  Patient recently had blood work done through work.  Lab results were reviewed.  This will be scanned into the computer.  Having more neck pain.  History of cervical fusion.  Gets paresthesia in right hand. pain comes and goes.  Very painful when he lies down.  He is tolerating it and does not want anything done at this time.    He was diagnosed with a cavernous hemangioma in the brain.  Saw neurosurgeon and nothing can be done.  Caused vision problems which resolved.  Still getting right sided paresthesias. he can tolerate these.      ROS:   Gen.: No fever, chills, weight loss, weight gain  HEENT: No headaches, sinus congestion, sore throat, change in vision  CV: No chest pain  RESP: No shortness of breath, wheezing, cough  GI: No change in bowel habits, no diarrhea, no abdominal pain, no hematochezia  : No dysuria, frequency  MSK: Occasional left hip pain  NEURO: No numbness, weakness  ENDO: No polyuria, polydipsia, heat or cold intolerance    PMH, PSH, ALLERGIES, SH, FH reviewed in office note on 4/10/11  Medications reconciled in the nurse's notes    PHYSICAL EXAM;   Vitals:    07/05/17 1324   BP: 124/78   Pulse: 74   Resp: 18     APPEARANCE: Well nourished, well developed, in no acute distress.   HEAD: Normocephalic, atraumatic.  EYES: PERRL. EOMI.   EARS: TM's intact. Light reflex normal. No retraction or perforation.  Very hard of hearing   NOSE: Mucosa pink.  Airway clear.  MOUTH & THROAT: No tonsillar enlargement. No pharyngeal erythema or exudate. No stridor.  NECK: Supple.  No carotid bruits.  No JVD   CHEST: Lungs clear to auscultation.  CARDIOVASCULAR: Normal S1, S2. No rubs, murmurs or gallops.  MUSCULOSKELETAL: No clubbing cyanosis or edema  SKIN: No rashes or pigmented moles.  NEUROLOGIC:    Cranial Nerves: II-XII grossly intact.   Motor: No focal deficits   MENTAL STATUS: Normal orientation to person, place and time, normal affect, normal flow thought, normal memory.    Lab Results   Component Value Date    LDLCALC 136.0 01/06/2017   Trig 339  HDL 27  LDLc 119    ASSESSMENT/PLAN:    HTN -   Encouraged patient to avoid table salt and try to follow a 2 g sodium diet  Continue propranolol 80 mg daily, Diovan 80 mg p.o. daily.  Walk 20 minutes per day.  Try to lose weight.    Dyslipidemia-  Low fat diet. Avoid sweets. A 10 pound weight loss by the next visit as a  good goal. Increase consumption of fruits and vegetables, fish and chicken.  Continue Lipitor 10 mg daily, Trilipix 135 mg p.o. daily.    Essential hypertension  -     valsartan (DIOVAN) 80 MG tablet; Take 1 tablet (80 mg total) by mouth once daily.  Dispense: 30 tablet; Refill: 5  -     propranolol (INDERAL LA) 80 MG 24 hr capsule; Take 1 capsule (80 mg total) by mouth once daily.  Dispense: 30 capsule; Refill: 5  -     ALT (SGPT); Future; Expected date: 09/05/2017  -     Basic metabolic panel; Future; Expected date: 09/05/2017    Hyperlipidemia, unspecified hyperlipidemia type  -     fenofibric acid (FIBRICOR) 135 mg CpDR; Take 1 capsule (135 mg total) by mouth once daily.  Dispense: 30 capsule; Refill: 5  -     atorvastatin (LIPITOR) 20 MG tablet; Take 1 tablet (20 mg total) by mouth once daily.  Dispense: 30 tablet; Refill: 5  -     Lipid panel; Future; Expected date: 09/05/2017    Cerebral cavernous malformation  Keep appointment with neurosurgery  They recommend repeat MRI in 6 months.    Cervical  disc disease with myelopathy  Follow-up with neurosurgery for further evaluation      Next appointment will be in 6 months.

## 2017-09-27 DIAGNOSIS — E78.5 HYPERLIPIDEMIA, UNSPECIFIED HYPERLIPIDEMIA TYPE: ICD-10-CM

## 2017-09-27 DIAGNOSIS — I10 ESSENTIAL HYPERTENSION: ICD-10-CM

## 2017-09-27 RX ORDER — VALSARTAN 80 MG/1
TABLET ORAL
Qty: 30 TABLET | Refills: 5 | Status: SHIPPED | OUTPATIENT
Start: 2017-09-27 | End: 2018-01-16 | Stop reason: SDUPTHER

## 2017-09-27 RX ORDER — PROPRANOLOL HYDROCHLORIDE 80 MG/1
CAPSULE, EXTENDED RELEASE ORAL
Qty: 30 CAPSULE | Refills: 5 | Status: SHIPPED | OUTPATIENT
Start: 2017-09-27 | End: 2018-01-16 | Stop reason: SDUPTHER

## 2017-09-27 RX ORDER — FENOFIBRIC ACID 135 MG/1
CAPSULE, DELAYED RELEASE ORAL
Qty: 30 CAPSULE | Refills: 5 | Status: SHIPPED | OUTPATIENT
Start: 2017-09-27 | End: 2018-01-16 | Stop reason: SDUPTHER

## 2017-09-27 RX ORDER — ATORVASTATIN CALCIUM 20 MG/1
TABLET, FILM COATED ORAL
Qty: 30 TABLET | Refills: 5 | Status: SHIPPED | OUTPATIENT
Start: 2017-09-27 | End: 2018-01-16 | Stop reason: SDUPTHER

## 2017-10-12 ENCOUNTER — TELEPHONE (OUTPATIENT)
Dept: NEUROSURGERY | Facility: CLINIC | Age: 52
End: 2017-10-12

## 2017-10-12 NOTE — TELEPHONE ENCOUNTER
----- Message from Norris Padilla sent at 10/12/2017  9:35 AM CDT -----  Contact: Helen Villegas @ [phone]  Caller is calling to schedule a urgent appt, referral sent to Derek cintron pls call

## 2017-10-17 ENCOUNTER — OFFICE VISIT (OUTPATIENT)
Dept: NEUROSURGERY | Facility: CLINIC | Age: 52
End: 2017-10-17
Payer: COMMERCIAL

## 2017-10-17 DIAGNOSIS — Q28.3 CEREBRAL CAVERNOUS MALFORMATION: ICD-10-CM

## 2017-10-17 DIAGNOSIS — I10 ESSENTIAL HYPERTENSION: ICD-10-CM

## 2017-10-17 DIAGNOSIS — M50.00 CERVICAL DISC DISEASE WITH MYELOPATHY: Primary | ICD-10-CM

## 2017-10-17 DIAGNOSIS — M47.812 ARTHROPATHY OF CERVICAL FACET JOINT: ICD-10-CM

## 2017-10-17 PROCEDURE — 99999 PR PBB SHADOW E&M-EST. PATIENT-LVL II: CPT | Mod: PBBFAC,,, | Performed by: NEUROLOGICAL SURGERY

## 2017-10-17 PROCEDURE — 99213 OFFICE O/P EST LOW 20 MIN: CPT | Mod: S$GLB,,, | Performed by: NEUROLOGICAL SURGERY

## 2017-10-17 NOTE — PROGRESS NOTES
"Subjective:    I, Linda Acevedo, am scribing for, and in the presence of, Dr. Will Davidson.     Patient ID: Joel Deluca is a 52 y.o. male.    Chief Complaint: No chief complaint on file.    HPI   Pt is a 51 yo male who presents today for consultation. Pt was referred by Dr. Skip Pierre. Pt reports in early 2017, while at work he begin to experience visual disturbance (double vision) as well as speech difficulty. Pt later FU with an Ophthalmologist and PCP a for further workup. In the past, the pt has also FU with Dr. Molina, regarding his current diagnosis "cerebral cavernous malformation." However, during today's office visit pt complains of right-side hemibody numbness and left hand uncontrollability.     There have been no changes since his visit with Dr. Molina. He reports that he is doing well at work and continues to drive.  He has not had any new symptomatology or imaging.    The patient was recently seen by Dr. Pierre for a herniated cervical disc causing weakness and occasional numbness of his hands.  He is being seen for the concern that his cavernous malformation may pose a problem at the time of cervical surgery.       Review of Systems   Constitutional: Negative for chills and fever.   HENT: Negative.    Eyes: Negative.    Respiratory: Negative.    Cardiovascular: Negative.    Gastrointestinal: Negative.    Endocrine: Negative.    Genitourinary: Negative.    Musculoskeletal: Negative.    Skin: Negative.    Allergic/Immunologic: Negative.    Neurological: Positive for numbness. Negative for tremors, weakness, light-headedness and headaches.   Hematological: Negative.    Psychiatric/Behavioral: Negative.        Past Medical History:   Diagnosis Date    Hyperlipidemia     Hypertension        Objective:     There were no vitals taken for this visit.    Physical Exam   Constitutional: He is oriented to person, place, and time. He appears well-developed and well-nourished.   Eyes: Pupils are " equal, round, and reactive to light.   Pulmonary/Chest: Effort normal.   Musculoskeletal: He exhibits no edema.   Neurological: He is alert and oriented to person, place, and time. No cranial nerve deficit.   Skin: Skin is warm and dry.   Psychiatric: He has a normal mood and affect.       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      I have personally reviewed the images with the pt.      I, Dr. Will Davidson, personally performed the services described in this documentation as scribed by Linda Acevedo in my presence, and it is both accurate and complete.    Assessment:       1. Cavernous malformation.  2. Herniated cervical disc.    Plan:   I have reviewed the patient's MRI of brain, which shows a lesion located in the tectum along with cerebral cavernous malformation. I have informed the patient that I do not recommend removal of the cavernous malformation at this time.  He is cleared to receive surgery for his cervical disc herniation.  I have recommended that he continue follow-up at this time for his cavernous malformation.

## 2017-10-17 NOTE — PATIENT INSTRUCTIONS
I have reviewed the patient's MRI of brain, which shows a lesion located in the tectum along with cerebral cavernous malformation. I have informed the patient that I do not recommend surgery at this time due to adverse side effects post-surgery. I have informed the patient to keep all scheduled appointments.

## 2017-10-17 NOTE — LETTER
October 17, 2017      Ihsan Pierre MD  [address]  [place] [address]           [facility]  [address]  [place] [address]  Phone: [phone]          Patient: Joel Deluca   MR Number: [id number]   YOB: 1965   Date of Visit: 10/17/2017       Dear Dr. Ihsan Pierre:    Thank you for referring Joel Deluca to me for evaluation. Attached you will find relevant portions of my assessment and plan of care.    If you have questions, please do not hesitate to call me. I look forward to following Joel Deluca along with you.    Sincerely,    Will Davidson MD    Enclosure  CC:  No Recipients    If you would like to receive this communication electronically, please contact [email or web address] or [phone] to request more information on [employer] access.    For providers and/or their staff who would like to refer a patient to [facility], please contact us through our one-stop-shop provider referral line, [facility] , at [phone].    If you feel you have received this communication in error or would no longer like to receive these types of communications, please e-mail [email or web address]

## 2017-11-10 DIAGNOSIS — Z12.11 COLON CANCER SCREENING: ICD-10-CM

## 2017-12-15 ENCOUNTER — OFFICE VISIT (OUTPATIENT)
Dept: URGENT CARE | Facility: CLINIC | Age: 52
End: 2017-12-15
Payer: COMMERCIAL

## 2017-12-15 VITALS
SYSTOLIC BLOOD PRESSURE: 147 MMHG | TEMPERATURE: 102 F | OXYGEN SATURATION: 99 % | RESPIRATION RATE: 18 BRPM | HEIGHT: 66 IN | DIASTOLIC BLOOD PRESSURE: 88 MMHG | BODY MASS INDEX: 32.95 KG/M2 | HEART RATE: 89 BPM | WEIGHT: 205 LBS

## 2017-12-15 DIAGNOSIS — J10.1 INFLUENZA B: ICD-10-CM

## 2017-12-15 DIAGNOSIS — R68.89 FLU-LIKE SYMPTOMS: Primary | ICD-10-CM

## 2017-12-15 LAB
CTP QC/QA: YES
FLUAV AG NPH QL: NEGATIVE
FLUBV AG NPH QL: POSITIVE

## 2017-12-15 PROCEDURE — 96372 THER/PROPH/DIAG INJ SC/IM: CPT | Mod: S$GLB,,, | Performed by: FAMILY MEDICINE

## 2017-12-15 PROCEDURE — 99214 OFFICE O/P EST MOD 30 MIN: CPT | Mod: 25,S$GLB,, | Performed by: FAMILY MEDICINE

## 2017-12-15 PROCEDURE — 87804 INFLUENZA ASSAY W/OPTIC: CPT | Mod: QW,S$GLB,, | Performed by: FAMILY MEDICINE

## 2017-12-15 RX ORDER — DEXAMETHASONE SODIUM PHOSPHATE 4 MG/ML
4 INJECTION, SOLUTION INTRA-ARTICULAR; INTRALESIONAL; INTRAMUSCULAR; INTRAVENOUS; SOFT TISSUE
Status: COMPLETED | OUTPATIENT
Start: 2017-12-15 | End: 2017-12-15

## 2017-12-15 RX ORDER — OSELTAMIVIR PHOSPHATE 75 MG/1
75 CAPSULE ORAL 2 TIMES DAILY
Qty: 10 CAPSULE | Refills: 0 | Status: SHIPPED | OUTPATIENT
Start: 2017-12-15 | End: 2017-12-20

## 2017-12-15 RX ORDER — NAPROXEN 500 MG/1
500 TABLET ORAL 2 TIMES DAILY WITH MEALS
Qty: 20 TABLET | Refills: 0 | Status: SHIPPED | OUTPATIENT
Start: 2017-12-15 | End: 2018-01-16

## 2017-12-15 RX ADMIN — DEXAMETHASONE SODIUM PHOSPHATE 4 MG: 4 INJECTION, SOLUTION INTRA-ARTICULAR; INTRALESIONAL; INTRAMUSCULAR; INTRAVENOUS; SOFT TISSUE at 07:12

## 2017-12-15 NOTE — LETTER
December 15, 2017  Joel Deluca  [address] [place] [address]                [facility] [place]  [address]  [place] [address]  Phone: [phone]  Fax: [phone] Joel Deluca was seen and treated in our Urgent Care department on 12/15/2017. He may return to work in 2 - 3 days.      If you have any questions or concerns, please don't hesitate to call.    Sincerely,        Perez Stinson MD

## 2017-12-16 NOTE — PATIENT INSTRUCTIONS
Please drink plenty of fluids.  Please get plenty of rest.  Please return here or go to the Emergency Department for any concerns or worsening of condition.  If you were given wait & see antibiotics, please wait 3-5 days before taking them, and only take them if your symptoms have worsened or not improved.  If you do begin taking the antibiotics, please take them to completion.  If you were prescribed antibiotics, please take them to completion.    If you were prescribed a narcotic medication, do not drive or operate heavy equipment or machinery while taking these medications.    You were given a decongestant (RESCON or POLY VENT Dm).  If your insurance does not cover it or you cannot afford it, it is ok to use the over the counter products listed below.  If you do not have Hypertension or any history of palpitations, it is ok to take over the counter Sudafed or Mucinex D or Allegra-D or Claritin-D or Zyrtec-D.  If you do take one of the above, it is ok to combine that with plain over the counter Mucinex or Allegra or Claritin or Zyrtec.  If for example you are taking Zyrtec -D, you can combine that with Mucinex, but not Mucinex-D.  If you are taking Mucinex-D, you can combine that with plain Allegra or Claritin or Zyrtec.   If you do have Hypertension or palpitations, it is safe to take Coricidin HBP for relief of sinus symptoms.    We recommend you take over the counter Flonase (Fluticasone) or another nasally inhaled steroid unless you are already taking one.      Nasal irrigation with a saline spray or Netti Pot like device per their directions is also recommended.  If not allergic, please take over the counter Tylenol (Acetaminophen)  as directed for control of pain and/or fever.  You may take Sudafed every 6 hours as directed for congestion.    You were given an injection of steroid.  This will relieve swelling and inflammation and improve respiratory symptoms.  It can raise your blood sugar if you are  diabetic.    Please follow up with your primary care doctor or specialist as needed.    Otis Martinez MD  [phone]    If you  smoke, please stop smoking.         Influenza (Adult)    Influenza is also called the flu. It is a viral illness that affects the air passages of your lungs. It is different from the common cold. The flu can easily be passed from one to person to another. It may be spread through the air by coughing and sneezing. Or it can be spread by touching the sick person and then touching your own eyes, nose, or mouth.  The flu starts 1 to 3 days after you are exposed to the flu virus. It may last for 1 to 2 weeks. You usually dont need to take antibiotics unless you have a complication. This might be an ear or sinus infection or pneumonia.  Symptoms of the flu may be mild or severe. They can include extreme tiredness (wanting to stay in bed all day), chills, fevers, muscle aches, soreness with eye movement, headache, and a dry, hacking cough.  Home care  Follow these guidelines when caring for yourself at home:  · Avoid being around cigarette smoke, whether yours or other peoples.  · Acetaminophen or ibuprofen will help ease your fever, muscle aches, and headache. Dont give aspirin to anyone younger than 18 who has the flu. Aspirin can harm the liver.  · Nausea and loss of appetite are common with the flu. Eat light meals. Drink 6 to 8 glasses of liquids every day. Good choices are water, sport drinks, soft drinks without caffeine, juices, tea, and soup. Extra fluids will also help loosen secretions in your nose and lungs.  · Over-the-counter cold medicines will not make the flu go away faster. But the medicines may help with coughing, sore throat, and congestion in your nose and sinuses. Dont use a decongestant if you have high blood pressure.  · Stay home until your fever has been gone for at least 24 hours without using medicine to reduce fever.  Follow-up care  Follow up with your  healthcare provider, or as advised, if you are not getting better over the next week.  If you are 65 or older, talk with your provider about getting a pneumococcal vaccine every 5 years. You should also get this vaccine if you have chronic asthma or COPD. All adults should get a flu vaccine every fall. Ask your provider about this.  When to seek medical advice  Call your healthcare provider right away if any of these occur:  · Cough with lots of colored sputum (mucus) or blood in your sputum  · Chest pain, shortness of breath, wheezing, or difficulty breathing  · Severe headache, or face, neck, or ear pain  · New rash with fever  · Fever of 100.4°F (38°C) or higher, or as directed by your healthcare provider  · Confusion, behavior change, or seizure  · Severe weakness or dizziness  · You get a fever or cough after getting better for a few days  Date Last Reviewed: 12/23/2014  © 8555-9765 [employer]. [address] [place] [address], [place] [address]. All rights reserved. This information is not intended as a substitute for professional medical care. Always follow your healthcare professional's instructions.

## 2017-12-16 NOTE — PROGRESS NOTES
"Subjective:       Patient ID: Joel Deluca is a 52 y.o. male.    Vitals:  height is 5' 6" (1.676 m) and weight is 93 kg (205 lb). His temperature is 101.8 °F (38.8 °C) (abnormal). His blood pressure is 147/88 (abnormal) and his pulse is 89. His respiration is 18 and oxygen saturation is 99%.     Chief Complaint: URI    URI    This is a new problem. The current episode started yesterday. The problem has been gradually worsening. Associated symptoms include coughing and headaches. Pertinent negatives include no abdominal pain, chest pain, congestion, ear pain, nausea, sore throat or wheezing. He has tried nothing for the symptoms.     Review of Systems   Constitution: Negative for chills, fever and malaise/fatigue.   HENT: Negative for congestion, ear pain, hoarse voice and sore throat.    Eyes: Negative for discharge and redness.   Cardiovascular: Negative for chest pain, dyspnea on exertion and leg swelling.   Respiratory: Positive for cough. Negative for shortness of breath, sputum production and wheezing.    Musculoskeletal: Positive for myalgias.   Gastrointestinal: Negative for abdominal pain and nausea.   Neurological: Positive for headaches.       Objective:      Physical Exam   Constitutional: He is oriented to person, place, and time. He appears well-developed and well-nourished. He is cooperative.  Non-toxic appearance. He does not appear ill. No distress.   HENT:   Head: Normocephalic and atraumatic.   Right Ear: Hearing, tympanic membrane, external ear and ear canal normal.   Left Ear: Hearing, tympanic membrane, external ear and ear canal normal.   Nose: Nose normal. No mucosal edema, rhinorrhea or nasal deformity. No epistaxis. Right sinus exhibits no maxillary sinus tenderness and no frontal sinus tenderness. Left sinus exhibits no maxillary sinus tenderness and no frontal sinus tenderness.   Mouth/Throat: Uvula is midline and mucous membranes are normal. No trismus in the jaw. Normal dentition. No " uvula swelling. Posterior oropharyngeal edema and posterior oropharyngeal erythema present.   Eyes: Conjunctivae and lids are normal. No scleral icterus.   Sclera clear bilat   Neck: Trachea normal, full passive range of motion without pain and phonation normal. Neck supple.   Cardiovascular: Normal rate, regular rhythm, normal heart sounds, intact distal pulses and normal pulses.    Pulmonary/Chest: Effort normal and breath sounds normal. No respiratory distress.   Abdominal: Soft. Normal appearance and bowel sounds are normal. He exhibits no distension. There is no tenderness.   Musculoskeletal: Normal range of motion. He exhibits no edema or deformity.   Neurological: He is alert and oriented to person, place, and time. He exhibits normal muscle tone. Coordination normal.   Skin: Skin is warm, dry and intact. He is not diaphoretic. No pallor.   Psychiatric: He has a normal mood and affect. His speech is normal and behavior is normal. Judgment and thought content normal. Cognition and memory are normal.   Nursing note and vitals reviewed.    Flu + B, neg A  Assessment:       1. Flu-like symptoms    2. Influenza B        Plan:         Flu-like symptoms  -     POCT Influenza A/B    Influenza B    Other orders  -     dexamethasone injection 4 mg; Inject 1 mL (4 mg total) into the muscle one time.  -     oseltamivir (TAMIFLU) 75 MG capsule; Take 1 capsule (75 mg total) by mouth 2 (two) times daily.  Dispense: 10 capsule; Refill: 0  -     dexchlorpheniramin-pseudoephed (RESCON) 2-60 mg Tab; Take 1 tablet by mouth 2 (two) times daily as needed.  Dispense: 20 tablet; Refill: 0  -     naproxen (NAPROSYN) 500 MG tablet; Take 1 tablet (500 mg total) by mouth 2 (two) times daily with meals.  Dispense: 20 tablet; Refill: 0      Please drink plenty of fluids.  Please get plenty of rest.  Please return here or go to the Emergency Department for any concerns or worsening of condition.  If you were given wait & see antibiotics,  please wait 3-5 days before taking them, and only take them if your symptoms have worsened or not improved.  If you do begin taking the antibiotics, please take them to completion.  If you were prescribed antibiotics, please take them to completion.    If you were prescribed a narcotic medication, do not drive or operate heavy equipment or machinery while taking these medications.    You were given a decongestant (RESCON or POLY VENT Dm).  If your insurance does not cover it or you cannot afford it, it is ok to use the over the counter products listed below.  If you do not have Hypertension or any history of palpitations, it is ok to take over the counter Sudafed or Mucinex D or Allegra-D or Claritin-D or Zyrtec-D.  If you do take one of the above, it is ok to combine that with plain over the counter Mucinex or Allegra or Claritin or Zyrtec.  If for example you are taking Zyrtec -D, you can combine that with Mucinex, but not Mucinex-D.  If you are taking Mucinex-D, you can combine that with plain Allegra or Claritin or Zyrtec.   If you do have Hypertension or palpitations, it is safe to take Coricidin HBP for relief of sinus symptoms.    We recommend you take over the counter Flonase (Fluticasone) or another nasally inhaled steroid unless you are already taking one.      Nasal irrigation with a saline spray or Netti Pot like device per their directions is also recommended.  If not allergic, please take over the counter Tylenol (Acetaminophen)  as directed for control of pain and/or fever.  You may take Sudafed every 6 hours as directed for congestion.    You were given an injection of steroid.  This will relieve swelling and inflammation and improve respiratory symptoms.  It can raise your blood sugar if you are diabetic.    Please follow up with your primary care doctor or specialist as needed.    Otis Martinez MD  [phone]    If you  smoke, please stop smoking.

## 2017-12-19 ENCOUNTER — OFFICE VISIT (OUTPATIENT)
Dept: NEUROSURGERY | Facility: CLINIC | Age: 52
End: 2017-12-19
Payer: COMMERCIAL

## 2017-12-19 VITALS
DIASTOLIC BLOOD PRESSURE: 84 MMHG | BODY MASS INDEX: 33.91 KG/M2 | SYSTOLIC BLOOD PRESSURE: 127 MMHG | WEIGHT: 211 LBS | TEMPERATURE: 99 F | HEART RATE: 73 BPM | HEIGHT: 66 IN | RESPIRATION RATE: 16 BRPM

## 2017-12-19 DIAGNOSIS — Q28.3 CEREBRAL CAVERNOUS MALFORMATION: Primary | ICD-10-CM

## 2017-12-19 PROCEDURE — 99999 PR PBB SHADOW E&M-EST. PATIENT-LVL III: CPT | Mod: PBBFAC,,, | Performed by: NEUROLOGICAL SURGERY

## 2017-12-19 PROCEDURE — 99214 OFFICE O/P EST MOD 30 MIN: CPT | Mod: S$GLB,,, | Performed by: NEUROLOGICAL SURGERY

## 2017-12-19 NOTE — PROGRESS NOTES
"History & Physical    I, Aram Ortiz, attest that this documentation has been prepared under the direction and in the presence of Avery Mejia MD.    12/19/2017    No chief complaint on file.      History of Present Illness:  Joel Deluca is a 52 y.o. 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  Patient presents for follow up evaluation. Patient reports resolution of diplopia since his last visit. Patient continues to complain of sensation of "being wet" on the right side of his body. Patient now also complains of loss of balance when moving backwards, such as when climbing downwards from deer stands; requiring some time to steady himself before regaining balance.  He states that 2-3 weeks after our last visit, his speech had started to become altered as though "talking drunk." He also complains of difficulty with directing his left hand, such as missing when reaching out to grab a bucket.   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 He continues to deny any weakness, nausea, vomiting. He denies any drooling, swallowing difficulty, or hearing difficulty.     Interval History, dated 5/31/2017:   Joel Deluca is a 52 y.o. patient with history of a 4th nerve palsy.  Patient is referred to me by Dr. Otis Martinez.  Patient reports that two weeks ago he woke up with double vision and had a feeling of "being wet" on his right hemibody.  Patient denies weakness, speech problems, nausea, vomit, trouble with his gait and hearing loss. 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     Review of patient's allergies indicates:  No Known Allergies    Current Outpatient Prescriptions   Medication Sig Dispense Refill    atorvastatin (LIPITOR) 20 MG tablet Take 1 tablet (20 mg total) by mouth once daily. 30 tablet 5    atorvastatin (LIPITOR) 20 MG tablet TAKE 1 TABLET BY MOUTH EVERY DAY 30 tablet 5    " dexchlorpheniramin-pseudoephed (RESCON) 2-60 mg Tab Take 1 tablet by mouth 2 (two) times daily as needed. 20 tablet 0    fenofibric acid (FIBRICOR) 135 mg CpDR Take 1 capsule (135 mg total) by mouth once daily. 30 capsule 5    fenofibric acid (FIBRICOR) 135 mg CpDR TAKE ONE CAPSULE BY MOUTH EVERY DAY 30 capsule 5    naproxen (NAPROSYN) 500 MG tablet Take 1 tablet (500 mg total) by mouth 2 (two) times daily with meals. 20 tablet 0    oseltamivir (TAMIFLU) 75 MG capsule Take 1 capsule (75 mg total) by mouth 2 (two) times daily. 10 capsule 0    propranolol (INDERAL LA) 80 MG 24 hr capsule Take 1 capsule (80 mg total) by mouth once daily. 30 capsule 5    propranolol (INDERAL LA) 80 MG 24 hr capsule TAKE ONE CAPSULE BY MOUTH EVERY DAY 30 capsule 5    valsartan (DIOVAN) 80 MG tablet Take 1 tablet (80 mg total) by mouth once daily. 30 tablet 5    valsartan (DIOVAN) 80 MG tablet TAKE 1 TABLET BY MOUTH EVERY DAY 30 tablet 5     No current facility-administered medications for this visit.        Past Medical History:   Diagnosis Date    Hyperlipidemia     Hypertension        Past Surgical History:   Procedure Laterality Date    ANTERIOR CERVICAL DISCECTOMY W/ FUSION         Family History   Problem Relation Age of Onset    Hypertension Mother     Heart disease Father     Hypertension Father        Social History   Substance Use Topics    Smoking status: Never Smoker    Smokeless tobacco: Never Used    Alcohol use Yes        Review of Systems:  Review of Systems   Constitutional: Negative for appetite change.   HENT: Negative for congestion, drooling and trouble swallowing.    Eyes: Negative for discharge and visual disturbance.   Respiratory: Negative for apnea.    Cardiovascular: Negative for chest pain.   Gastrointestinal: Negative for abdominal distention.   Endocrine: Negative for cold intolerance.   Genitourinary: Negative for difficulty urinating.   Musculoskeletal: Positive for gait problem. Negative  "for arthralgias.   Allergic/Immunologic: Negative for environmental allergies.   Neurological: Positive for speech difficulty. Negative for dizziness, weakness and headaches.        Patient complains of difficulty with directing motion of his left hand.   Patient also complains of constant sensation of feeling wet on the right side of his body.    Psychiatric/Behavioral: Negative for agitation.       Vital Signs (Most Recent)  Temp: 98.5 °F (36.9 °C) (12/19/17 0928)  Pulse: 73 (12/19/17 0928)  Resp: 16 (12/19/17 0928)  BP: 127/84 (12/19/17 0928)  5' 6" (1.676 m)  95.7 kg (211 lb)       Physical Exam:  Physical Exam:    Constitutional: He appears well-developed.     Eyes: Pupils are equal, round, and reactive to light. EOM are normal. Right eye exhibits no discharge. Left eye exhibits no discharge.     Abdominal: Soft.     Skin: Skin displays no rash on trunk and no rash on extremities.     Psych/Behavior: He is alert. He is oriented to person, place, and time.     Musculoskeletal: Gait is normal.        Neck: There is no tenderness.        Back: Range of motion is full.        Right Upper Extremities: Range of motion is full. Muscle strength is 5/5. Tone is normal.        Left Upper Extremities: Range of motion is full. Muscle strength is 5/5. Tone is normal.       Right Lower Extremities: Range of motion is full. Muscle strength is 5/5. Tone is normal.        Left Lower Extremities: Range of motion is full. Muscle strength is 5/5. Tone is normal.     Neurological:        Coordination: He has a normal Romberg Test and normal tandem walking coordination.        Sensory: There is no sensory deficit in the trunk. There is no sensory deficit in the extremities.        DTRs: DTRs are DTRS NORMAL AND SYMMETRICnormal and symmetric. Tricep reflexes are 2+ on the right side and 2+ on the left side. Bicep reflexes are 2+ on the right side and 2+ on the left side. Brachioradialis reflexes are 2+ on the right side and 2+ on the " left side. Patellar reflexes are 2+ on the right side and 2+ on the left side. Achilles reflexes are 2+ on the right side and 2+ on the left side. He displays no Babinski's sign on the right side. He displays no Babinski's sign on the left side.        Cranial nerves: Cranial nerve(s) II, III, IV, V, VI, VII, VIII, IX, X, XI and XII are intact.     No pronator drift.   5/5 strength throughout.   Decreased hearing acuity on the left ear.  Sensation is intact.       Laboratory  none    Diagnostic Results:  MRI: Reviewed    MRI Brain W WO Contrast, dated 12/18/2017: I have personally reviewed these images and discussed them with the patient.   1.8 cm lesion seen within the left posterior brainstem/kim with heterogeneous signal suggesting a cavernous malformation.  This is increased in size from 05/26/2017.    ASSESSMENT/PLAN:       ICD-10-CM ICD-9-CM   1. Cerebral cavernous malformation Q28.3 228.02       PLAN:    1. Brainstem cavernoma, increased in size from his previous evaluation in May 2017. I have explained again the natural history of brainstem cavernomas, explained the 1-3% risk of hemorrhage per year. Considering the increase in size, with his newer cranial nerve problems (his slurred speech specially), I have advised him to either have close monitoring with an MRI of the brain with and without contrast with stealth Protocol in 3 months or to proceed with surgery for resection of the cavernoma. I have explained, the risks/benefits of surgical intervention, including worsening cranial neuropathy, double vision, stroke, paralysis, ophthalmoplegia, need for permament/temporary feeding tube, breathing tubes, trouble swallowing, trouble breathing.     2. Patient will discuss with his wife and keep us posted about his final decision. I have explained that he would  At least need a subtemporal approach for brainstem cavernoma resection with placement of lumbar drain.     3. I spent 35 minutes with the patient, 50%  of time in counseling.       Diagnoses and all orders for this visit:    Cerebral cavernous malformation        I, Dr. Avery Mejia, personally performed the services described in this documentation. All medical record entries made by the scribe were at my direction and in my presence.  I have reviewed the chart and agree that the record reflects my personal performance and is accurate and complete. Avery Mejia MD.  12:28 PM 12/19/2017

## 2017-12-22 ENCOUNTER — TELEPHONE (OUTPATIENT)
Dept: NEUROSURGERY | Facility: CLINIC | Age: 52
End: 2017-12-22

## 2017-12-22 ENCOUNTER — TELEPHONE (OUTPATIENT)
Dept: FAMILY MEDICINE | Facility: CLINIC | Age: 52
End: 2017-12-22

## 2017-12-22 NOTE — TELEPHONE ENCOUNTER
----- Message from Grzegorz Blair sent at 2017 11:07 AM CST -----  Contact: ANAYELI / WIFE   Joel Deluca  MRN: [id number]  : 1965  PCP: Otis Martinez  Home Phone      [phone]  Work Phone      Not on file.  Mobile          Not on file.      MESSAGE: PT WAS REFERRED TO A NEUROLOGIST BY DR ARRIAZA BUT THEY WOULD LIKE TO SEE ABOUT SEEING SOMEONE WITH A PARTICULAR SPECIALTY. PLEASE CALL TO DISCUSS     PHONE: [phone]

## 2017-12-22 NOTE — TELEPHONE ENCOUNTER
Spoke with patient's wife and she is wanting to go to a neurologist that specializes in his condition.  She is wanting you to find him a neurologist. Willing to go out of the state. States that she found 2 but would like your opinion on them.  Dr Ravindra Unger @ [facility] in [place] and Dr Guillermo Serra @ [facility] in [place].  Please advise.

## 2017-12-22 NOTE — TELEPHONE ENCOUNTER
Spoke with Carmela. She said they are looking at getting a second opinion and wanted to know how to go about it. Explained they did not need a referral from us, that they simply choose who they would like to see and that physician can request their medical records. V/u.    ----- Message from Rosa Bright sent at 12/22/2017  8:58 AM CST -----  Contact: Pt's wife, Carmela  Pt's wife, Carmela, is calling to speak with nurse in regards to second opinion and would like a recommendation.    Carmela can be reached at [phone].  Thank you

## 2017-12-26 NOTE — TELEPHONE ENCOUNTER
I do not personally know these neurologists but they seem to be excellent.  Recommend [facility] since it is closer

## 2018-01-02 ENCOUNTER — TELEPHONE (OUTPATIENT)
Dept: NEUROSURGERY | Facility: CLINIC | Age: 53
End: 2018-01-02

## 2018-01-02 NOTE — TELEPHONE ENCOUNTER
I returned the pt call regarding surgery.  The pt has decided to move forward and will be scheduled for Jan 31st.  He will come in Jan 10th for consent signing.

## 2018-01-02 NOTE — TELEPHONE ENCOUNTER
----- Message from Deborah Miramontes sent at 1/2/2018  9:24 AM CST -----  Contact: self @ [phone]  Pt was last seen on 12-19-17.  Pt is calling to see what his next step is.  Pls call to discuss.

## 2018-01-16 ENCOUNTER — OFFICE VISIT (OUTPATIENT)
Dept: FAMILY MEDICINE | Facility: CLINIC | Age: 53
End: 2018-01-16
Payer: COMMERCIAL

## 2018-01-16 ENCOUNTER — APPOINTMENT (OUTPATIENT)
Dept: RADIOLOGY | Facility: CLINIC | Age: 53
End: 2018-01-16
Attending: FAMILY MEDICINE
Payer: COMMERCIAL

## 2018-01-16 VITALS
BODY MASS INDEX: 33.81 KG/M2 | RESPIRATION RATE: 18 BRPM | DIASTOLIC BLOOD PRESSURE: 86 MMHG | HEIGHT: 66 IN | HEART RATE: 72 BPM | WEIGHT: 210.38 LBS | SYSTOLIC BLOOD PRESSURE: 140 MMHG

## 2018-01-16 DIAGNOSIS — Z01.818 PREOPERATIVE EVALUATION TO RULE OUT SURGICAL CONTRAINDICATION: ICD-10-CM

## 2018-01-16 DIAGNOSIS — Q28.3 CEREBRAL CAVERNOUS MALFORMATION: ICD-10-CM

## 2018-01-16 DIAGNOSIS — Z01.818 PREOPERATIVE EVALUATION TO RULE OUT SURGICAL CONTRAINDICATION: Primary | ICD-10-CM

## 2018-01-16 DIAGNOSIS — I10 ESSENTIAL HYPERTENSION: ICD-10-CM

## 2018-01-16 LAB
ANION GAP SERPL CALC-SCNC: 11 MMOL/L
APTT BLDCRRT: 28.5 SEC
BACTERIA SPEC CULT: NORMAL
BASOPHILS # BLD AUTO: 0.03 K/UL
BASOPHILS NFR BLD: 0.4 %
BILIRUB SERPL-MCNC: 0 MG/DL
BLOOD URINE, POC: NORMAL
BUN SERPL-MCNC: 20 MG/DL
CALCIUM SERPL-MCNC: 10.4 MG/DL
CASTS: 0
CHLORIDE SERPL-SCNC: 102 MMOL/L
CO2 SERPL-SCNC: 25 MMOL/L
COLOR, POC UA: NORMAL
CREAT SERPL-MCNC: 1 MG/DL
CRYSTALS: 0
DIFFERENTIAL METHOD: ABNORMAL
EOSINOPHIL # BLD AUTO: 0.1 K/UL
EOSINOPHIL NFR BLD: 1.8 %
ERYTHROCYTE [DISTWIDTH] IN BLOOD BY AUTOMATED COUNT: 13.4 %
EST. GFR  (AFRICAN AMERICAN): >60 ML/MIN/1.73 M^2
EST. GFR  (NON AFRICAN AMERICAN): >60 ML/MIN/1.73 M^2
GLUCOSE SERPL-MCNC: 97 MG/DL
GLUCOSE UR QL STRIP: 0
HCT VFR BLD AUTO: 38.9 %
HGB BLD-MCNC: 13 G/DL
INR PPP: 1
KETONES UR QL STRIP: NORMAL
LEUKOCYTE ESTERASE URINE, POC: 0
LYMPHOCYTES # BLD AUTO: 2.2 K/UL
LYMPHOCYTES NFR BLD: 29.6 %
MCH RBC QN AUTO: 29.9 PG
MCHC RBC AUTO-ENTMCNC: 33.4 G/DL
MCV RBC AUTO: 89 FL
MONOCYTES # BLD AUTO: 0.7 K/UL
MONOCYTES NFR BLD: 10.1 %
NEUTROPHILS # BLD AUTO: 4.2 K/UL
NEUTROPHILS NFR BLD: 58.1 %
NITRITE, POC UA: 0
PH, POC UA: 6
PLATELET # BLD AUTO: 313 K/UL
PMV BLD AUTO: 8.7 FL
POTASSIUM SERPL-SCNC: 4 MMOL/L
PROTEIN, POC: NORMAL
PROTHROMBIN TIME: 10.7 SEC
RBC # BLD AUTO: 4.35 M/UL
RBC CELLS COUNTED: 0
SODIUM SERPL-SCNC: 138 MMOL/L
SPECIFIC GRAVITY, POC UA: 1.01
UROBILINOGEN, POC UA: 0
WBC # BLD AUTO: 7.3 K/UL
WHITE BLOOD CELLS: 0

## 2018-01-16 PROCEDURE — 85610 PROTHROMBIN TIME: CPT

## 2018-01-16 PROCEDURE — 93005 ELECTROCARDIOGRAM TRACING: CPT | Mod: S$GLB,,, | Performed by: FAMILY MEDICINE

## 2018-01-16 PROCEDURE — 85730 THROMBOPLASTIN TIME PARTIAL: CPT

## 2018-01-16 PROCEDURE — 71046 X-RAY EXAM CHEST 2 VIEWS: CPT | Mod: TC,PO

## 2018-01-16 PROCEDURE — 81001 URINALYSIS AUTO W/SCOPE: CPT | Mod: S$GLB,,, | Performed by: FAMILY MEDICINE

## 2018-01-16 PROCEDURE — 93010 ELECTROCARDIOGRAM REPORT: CPT | Mod: S$GLB,,, | Performed by: INTERNAL MEDICINE

## 2018-01-16 PROCEDURE — 85025 COMPLETE CBC W/AUTO DIFF WBC: CPT

## 2018-01-16 PROCEDURE — 81000 URINALYSIS NONAUTO W/SCOPE: CPT | Mod: S$GLB,,, | Performed by: FAMILY MEDICINE

## 2018-01-16 PROCEDURE — 71046 X-RAY EXAM CHEST 2 VIEWS: CPT | Mod: 26,,, | Performed by: RADIOLOGY

## 2018-01-16 PROCEDURE — 99999 PR PBB SHADOW E&M-EST. PATIENT-LVL III: CPT | Mod: PBBFAC,,, | Performed by: FAMILY MEDICINE

## 2018-01-16 PROCEDURE — 80048 BASIC METABOLIC PNL TOTAL CA: CPT

## 2018-01-16 PROCEDURE — 99214 OFFICE O/P EST MOD 30 MIN: CPT | Mod: 25,S$GLB,, | Performed by: FAMILY MEDICINE

## 2018-01-16 NOTE — PROGRESS NOTES
Subjective:       Patient ID: Joel Deluca is a 53 y.o. male.    Chief Complaint: Surgical Clearance    Patient is scheduled for neurosurgery for a cavernous hemangioma as causing neurological symptoms.  Other than having a residual cough from the flu he obtained 2 weeks ago he's doing fairly well other than the burning sensation he has on the left side as well as balance problems.  Neurosurgeon feels surgery is the next course.  He is scheduled for surgery in 2 weeks.      Review of Systems   Constitutional: Negative for activity change, chills, fatigue, fever and unexpected weight change.   HENT: Negative for sore throat and trouble swallowing.    Respiratory: Positive for cough. Negative for chest tightness and shortness of breath.    Cardiovascular: Negative for chest pain and leg swelling.   Gastrointestinal: Negative for abdominal pain.   Endocrine: Negative for cold intolerance and heat intolerance.   Genitourinary: Negative for difficulty urinating.   Musculoskeletal: Negative for back pain and joint swelling.   Skin: Negative for rash.   Neurological: Positive for dizziness and speech difficulty. Negative for numbness.   Hematological: Negative for adenopathy.   Psychiatric/Behavioral: Negative for decreased concentration.       Objective:      Vitals:    01/16/18 0829   BP: (!) 140/86   Pulse: 72   Resp: 18     Physical Exam   Constitutional: He is oriented to person, place, and time. He appears well-developed and well-nourished.   HENT:   Head: Normocephalic and atraumatic.   Eyes: EOM are normal. Pupils are equal, round, and reactive to light.   Neck: Normal range of motion. Neck supple. No JVD present.   Cardiovascular: Normal rate, normal heart sounds and intact distal pulses.  Exam reveals no gallop and no friction rub.    No murmur heard.  Pulmonary/Chest: Effort normal and breath sounds normal.   Abdominal: Soft. There is no tenderness.   Musculoskeletal: He exhibits no edema or tenderness.    Neurological: He is alert and oriented to person, place, and time. No cranial nerve deficit.   Skin: No rash noted.   Psychiatric: He has a normal mood and affect. His behavior is normal. Judgment and thought content normal.       Chest x-ray looks normal  EKG is normal  Urinalysis looks good.  No white blood cells or red blood cells seen microscopically.  Lab Results   Component Value Date    WBC 7.30 01/16/2018    HGB 13.0 (L) 01/16/2018    HCT 38.9 (L) 01/16/2018    MCV 89 01/16/2018     01/16/2018     BMP  Lab Results   Component Value Date     01/16/2018    K 4.0 01/16/2018     01/16/2018    CO2 25 01/16/2018    BUN 20 01/16/2018    CREATININE 1.0 01/16/2018    CALCIUM 10.4 01/16/2018    ANIONGAP 11 01/16/2018    ESTGFRAFRICA >60 01/16/2018    EGFRNONAA >60 01/16/2018     Lab Results   Component Value Date    INR 1.0 01/16/2018     Lab Results   Component Value Date    APTT 28.5 01/16/2018       Assessment:       1. Preoperative evaluation to rule out surgical contraindication    2. Cerebral cavernous malformation    3. Essential hypertension        Plan:   Ray was seen today for surgical clearance.    Diagnoses and all orders for this visit:    Preoperative evaluation to rule out surgical contraindication  -     Basic metabolic panel; Future  -     CBC auto differential; Future  -     Protime-INR; Future  -     APTT; Future  -     POCT urinalysis, dipstick or tablet reag  -     POCT URINE SEDIMENT EXAM  -     EKG 12-lead  -     X-Ray Chest PA And Lateral; Future  All normal    Cerebral cavernous malformation  Patient is medically cleared for neurosurgery.    Essential hypertension  Continue Diovan 80 mg daily  Continue Inderal LA 80 mg daily    Return to clinic as needed

## 2018-01-18 ENCOUNTER — TELEPHONE (OUTPATIENT)
Dept: NEUROSURGERY | Facility: CLINIC | Age: 53
End: 2018-01-18

## 2018-01-18 DIAGNOSIS — Q28.3 CEREBRAL CAVERNOUS MALFORMATION: Primary | ICD-10-CM

## 2018-01-24 ENCOUNTER — TELEPHONE (OUTPATIENT)
Dept: NEUROSURGERY | Facility: CLINIC | Age: 53
End: 2018-01-24

## 2018-01-24 NOTE — TELEPHONE ENCOUNTER
----- Message from Ailyn Nolasco sent at 1/24/2018  1:32 PM CST -----  Contact: Self   Pt is requesting a call back in regards to his [facility] paperwork that he submitted on 01/10/18        Pt stated it is urgent he speaks with someone as soon as possible       Pt can be contacted at [phone]

## 2018-01-29 ENCOUNTER — TELEPHONE (OUTPATIENT)
Dept: NEUROSURGERY | Facility: CLINIC | Age: 53
End: 2018-01-29

## 2018-01-29 DIAGNOSIS — Q28.3 CAVERNOUS MALFORMATION: Primary | ICD-10-CM

## 2018-01-30 ENCOUNTER — ANESTHESIA EVENT (OUTPATIENT)
Dept: SURGERY | Facility: HOSPITAL | Age: 53
DRG: 027 | End: 2018-01-30
Payer: COMMERCIAL

## 2018-01-30 ENCOUNTER — HOSPITAL ENCOUNTER (OUTPATIENT)
Dept: RADIOLOGY | Facility: HOSPITAL | Age: 53
Discharge: HOME | DRG: 027 | End: 2018-01-30
Attending: NEUROLOGICAL SURGERY
Payer: COMMERCIAL

## 2018-01-30 DIAGNOSIS — Q28.3 CAVERNOUS MALFORMATION: ICD-10-CM

## 2018-01-30 PROCEDURE — 70553 MRI BRAIN STEM W/O & W/DYE: CPT | Mod: TC

## 2018-01-30 PROCEDURE — 70553 MRI BRAIN STEM W/O & W/DYE: CPT | Mod: 26,,, | Performed by: RADIOLOGY

## 2018-01-30 PROCEDURE — A9585 GADOBUTROL INJECTION: HCPCS | Performed by: NEUROLOGICAL SURGERY

## 2018-01-30 PROCEDURE — 25500020 PHARM REV CODE 255: Performed by: NEUROLOGICAL SURGERY

## 2018-01-30 RX ORDER — GADOBUTROL 604.72 MG/ML
10 INJECTION INTRAVENOUS
Status: COMPLETED | OUTPATIENT
Start: 2018-01-30 | End: 2018-01-30

## 2018-01-30 RX ADMIN — GADOBUTROL 10 ML: 604.72 INJECTION INTRAVENOUS at 10:01

## 2018-01-31 ENCOUNTER — ANESTHESIA (OUTPATIENT)
Dept: SURGERY | Facility: HOSPITAL | Age: 53
DRG: 027 | End: 2018-01-31
Payer: COMMERCIAL

## 2018-01-31 ENCOUNTER — HOSPITAL ENCOUNTER (INPATIENT)
Facility: HOSPITAL | Age: 53
LOS: 3 days | Discharge: HOME | DRG: 027 | End: 2018-02-03
Attending: NEUROLOGICAL SURGERY | Admitting: NEUROLOGICAL SURGERY
Payer: COMMERCIAL

## 2018-01-31 DIAGNOSIS — Q28.3 CEREBRAL CAVERNOUS MALFORMATION: Primary | ICD-10-CM

## 2018-01-31 DIAGNOSIS — Q28.3 CAVERNOUS MALFORMATION: ICD-10-CM

## 2018-01-31 PROBLEM — Z29.89 SEIZURE PROPHYLAXIS: Status: ACTIVE | Noted: 2018-01-31

## 2018-01-31 LAB
ABO + RH BLD: NORMAL
ALLENS TEST: ABNORMAL
ALLENS TEST: ABNORMAL
ANION GAP SERPL CALC-SCNC: 11 MMOL/L
BASOPHILS # BLD AUTO: 0.02 K/UL
BASOPHILS NFR BLD: 0.3 %
BLD GP AB SCN CELLS X3 SERPL QL: NORMAL
BUN SERPL-MCNC: 13 MG/DL
CALCIUM SERPL-MCNC: 8 MG/DL
CHLORIDE SERPL-SCNC: 110 MMOL/L
CO2 SERPL-SCNC: 21 MMOL/L
CREAT SERPL-MCNC: 1 MG/DL
DELSYS: ABNORMAL
DELSYS: ABNORMAL
DIFFERENTIAL METHOD: ABNORMAL
EOSINOPHIL # BLD AUTO: 0 K/UL
EOSINOPHIL NFR BLD: 0.1 %
ERYTHROCYTE [DISTWIDTH] IN BLOOD BY AUTOMATED COUNT: 13.7 %
ERYTHROCYTE [SEDIMENTATION RATE] IN BLOOD BY WESTERGREN METHOD: 17 MM/H
ERYTHROCYTE [SEDIMENTATION RATE] IN BLOOD BY WESTERGREN METHOD: 22 MM/H
EST. GFR  (AFRICAN AMERICAN): >60 ML/MIN/1.73 M^2
EST. GFR  (NON AFRICAN AMERICAN): >60 ML/MIN/1.73 M^2
FIO2: 36
FLOW: 15
FLOW: 4
GLUCOSE SERPL-MCNC: 103 MG/DL (ref 70–110)
GLUCOSE SERPL-MCNC: 107 MG/DL (ref 70–110)
GLUCOSE SERPL-MCNC: 117 MG/DL (ref 70–110)
GLUCOSE SERPL-MCNC: 154 MG/DL
HCO3 UR-SCNC: 19.9 MMOL/L (ref 24–28)
HCO3 UR-SCNC: 19.9 MMOL/L (ref 24–28)
HCO3 UR-SCNC: 20.4 MMOL/L (ref 24–28)
HCO3 UR-SCNC: 20.6 MMOL/L (ref 24–28)
HCO3 UR-SCNC: 21 MMOL/L (ref 24–28)
HCT VFR BLD AUTO: 31.7 %
HCT VFR BLD CALC: 26 %PCV (ref 36–54)
HCT VFR BLD CALC: 31 %PCV (ref 36–54)
HCT VFR BLD CALC: 32 %PCV (ref 36–54)
HGB BLD-MCNC: 10.5 G/DL
IMM GRANULOCYTES # BLD AUTO: 0.04 K/UL
IMM GRANULOCYTES NFR BLD AUTO: 0.5 %
LYMPHOCYTES # BLD AUTO: 0.9 K/UL
LYMPHOCYTES NFR BLD: 11.4 %
MCH RBC QN AUTO: 29.1 PG
MCHC RBC AUTO-ENTMCNC: 33.1 G/DL
MCV RBC AUTO: 88 FL
MODE: ABNORMAL
MODE: ABNORMAL
MONOCYTES # BLD AUTO: 0.1 K/UL
MONOCYTES NFR BLD: 1.7 %
NEUTROPHILS # BLD AUTO: 6.7 K/UL
NEUTROPHILS NFR BLD: 86 %
NRBC BLD-RTO: 0 /100 WBC
PCO2 BLDA: 29 MMHG (ref 35–45)
PCO2 BLDA: 31 MMHG (ref 35–45)
PCO2 BLDA: 33.4 MMHG (ref 35–45)
PCO2 BLDA: 33.9 MMHG (ref 35–45)
PCO2 BLDA: 34.6 MMHG (ref 35–45)
PH SMN: 7.38 [PH] (ref 7.35–7.45)
PH SMN: 7.39 [PH] (ref 7.35–7.45)
PH SMN: 7.39 [PH] (ref 7.35–7.45)
PH SMN: 7.43 [PH] (ref 7.35–7.45)
PH SMN: 7.45 [PH] (ref 7.35–7.45)
PLATELET # BLD AUTO: 269 K/UL
PMV BLD AUTO: 8.6 FL
PO2 BLDA: 177 MMHG (ref 80–100)
PO2 BLDA: 212 MMHG (ref 80–100)
PO2 BLDA: 222 MMHG (ref 80–100)
PO2 BLDA: 68 MMHG (ref 80–100)
PO2 BLDA: 81 MMHG (ref 80–100)
POC BE: -4 MMOL/L
POC BE: -5 MMOL/L
POC IONIZED CALCIUM: 1.08 MMOL/L (ref 1.06–1.42)
POC IONIZED CALCIUM: 1.13 MMOL/L (ref 1.06–1.42)
POC IONIZED CALCIUM: 1.19 MMOL/L (ref 1.06–1.42)
POC SATURATED O2: 100 % (ref 95–100)
POC SATURATED O2: 93 % (ref 95–100)
POC SATURATED O2: 96 % (ref 95–100)
POC TCO2: 21 MMOL/L (ref 23–27)
POC TCO2: 22 MMOL/L (ref 23–27)
POC TCO2: 22 MMOL/L (ref 23–27)
POTASSIUM BLD-SCNC: 4.2 MMOL/L (ref 3.5–5.1)
POTASSIUM BLD-SCNC: 4.5 MMOL/L (ref 3.5–5.1)
POTASSIUM BLD-SCNC: 4.8 MMOL/L (ref 3.5–5.1)
POTASSIUM SERPL-SCNC: 4.4 MMOL/L
RBC # BLD AUTO: 3.61 M/UL
SAMPLE: ABNORMAL
SITE: ABNORMAL
SITE: ABNORMAL
SODIUM BLD-SCNC: 134 MMOL/L (ref 136–145)
SODIUM BLD-SCNC: 138 MMOL/L (ref 136–145)
SODIUM BLD-SCNC: 142 MMOL/L (ref 136–145)
SODIUM SERPL-SCNC: 142 MMOL/L
SP02: 92
SP02: 95
WBC # BLD AUTO: 7.73 K/UL

## 2018-01-31 PROCEDURE — 8E09XBZ COMPUTER ASSISTED PROCEDURE OF HEAD AND NECK REGION: ICD-10-PCS | Performed by: NEUROLOGICAL SURGERY

## 2018-01-31 PROCEDURE — 80048 BASIC METABOLIC PNL TOTAL CA: CPT

## 2018-01-31 PROCEDURE — 36000713 HC OR TIME LEV V EA ADD 15 MIN: Performed by: NEUROLOGICAL SURGERY

## 2018-01-31 PROCEDURE — 27201423 OPTIME MED/SURG SUP & DEVICES STERILE SUPPLY: Performed by: NEUROLOGICAL SURGERY

## 2018-01-31 PROCEDURE — 27800903 OPTIME MED/SURG SUP & DEVICES OTHER IMPLANTS: Performed by: NEUROLOGICAL SURGERY

## 2018-01-31 PROCEDURE — 69990 MICROSURGERY ADD-ON: CPT | Mod: 59,,, | Performed by: NEUROLOGICAL SURGERY

## 2018-01-31 PROCEDURE — 0KU007Z SUPPLEMENT HEAD MUSCLE WITH AUTOLOGOUS TISSUE SUBSTITUTE, OPEN APPROACH: ICD-10-PCS | Performed by: NEUROLOGICAL SURGERY

## 2018-01-31 PROCEDURE — 4A11X4G MONITORING OF PERIPHERAL NERVOUS ELECTRICAL ACTIVITY, INTRAOPERATIVE, EXTERNAL APPROACH: ICD-10-PCS | Performed by: NEUROLOGICAL SURGERY

## 2018-01-31 PROCEDURE — 63600175 PHARM REV CODE 636 W HCPCS: Performed by: NEUROLOGICAL SURGERY

## 2018-01-31 PROCEDURE — 36620 INSERTION CATHETER ARTERY: CPT | Mod: 59,,, | Performed by: ANESTHESIOLOGY

## 2018-01-31 PROCEDURE — D9220A PRA ANESTHESIA: Mod: ,,, | Performed by: ANESTHESIOLOGY

## 2018-01-31 PROCEDURE — 61781 SCAN PROC CRANIAL INTRA: CPT | Mod: ,,, | Performed by: NEUROLOGICAL SURGERY

## 2018-01-31 PROCEDURE — 25000003 PHARM REV CODE 250: Performed by: NEUROLOGICAL SURGERY

## 2018-01-31 PROCEDURE — 0W910ZZ DRAINAGE OF CRANIAL CAVITY, OPEN APPROACH: ICD-10-PCS | Performed by: NEUROLOGICAL SURGERY

## 2018-01-31 PROCEDURE — 88307 TISSUE EXAM BY PATHOLOGIST: CPT | Mod: 26,,, | Performed by: PATHOLOGY

## 2018-01-31 PROCEDURE — 82803 BLOOD GASES ANY COMBINATION: CPT

## 2018-01-31 PROCEDURE — 61686 INTRACRANIAL VESSEL SURGERY: CPT | Mod: 62,,, | Performed by: NEUROLOGICAL SURGERY

## 2018-01-31 PROCEDURE — 36000712 HC OR TIME LEV V 1ST 15 MIN: Performed by: NEUROLOGICAL SURGERY

## 2018-01-31 PROCEDURE — 85025 COMPLETE CBC W/AUTO DIFF WBC: CPT

## 2018-01-31 PROCEDURE — C1713 ANCHOR/SCREW BN/BN,TIS/BN: HCPCS | Performed by: NEUROLOGICAL SURGERY

## 2018-01-31 PROCEDURE — 37799 UNLISTED PX VASCULAR SURGERY: CPT

## 2018-01-31 PROCEDURE — C1729 CATH, DRAINAGE: HCPCS | Performed by: NEUROLOGICAL SURGERY

## 2018-01-31 PROCEDURE — 20000000 HC ICU ROOM

## 2018-01-31 PROCEDURE — 63600175 PHARM REV CODE 636 W HCPCS: Performed by: PHYSICIAN ASSISTANT

## 2018-01-31 PROCEDURE — 009U30Z DRAINAGE OF SPINAL CANAL WITH DRAINAGE DEVICE, PERCUTANEOUS APPROACH: ICD-10-PCS | Performed by: NEUROLOGICAL SURGERY

## 2018-01-31 PROCEDURE — 94761 N-INVAS EAR/PLS OXIMETRY MLT: CPT

## 2018-01-31 PROCEDURE — 27000221 HC OXYGEN, UP TO 24 HOURS

## 2018-01-31 PROCEDURE — 25000003 PHARM REV CODE 250: Performed by: STUDENT IN AN ORGANIZED HEALTH CARE EDUCATION/TRAINING PROGRAM

## 2018-01-31 PROCEDURE — 00BC0ZZ EXCISION OF CEREBELLUM, OPEN APPROACH: ICD-10-PCS | Performed by: NEUROLOGICAL SURGERY

## 2018-01-31 PROCEDURE — C9113 INJ PANTOPRAZOLE SODIUM, VIA: HCPCS | Performed by: NEUROLOGICAL SURGERY

## 2018-01-31 PROCEDURE — 63600175 PHARM REV CODE 636 W HCPCS: Performed by: STUDENT IN AN ORGANIZED HEALTH CARE EDUCATION/TRAINING PROGRAM

## 2018-01-31 PROCEDURE — 25000003 PHARM REV CODE 250: Performed by: PHYSICIAN ASSISTANT

## 2018-01-31 PROCEDURE — 99900035 HC TECH TIME PER 15 MIN (STAT)

## 2018-01-31 PROCEDURE — 86920 COMPATIBILITY TEST SPIN: CPT

## 2018-01-31 PROCEDURE — 27201037 HC PRESSURE MONITORING SET UP

## 2018-01-31 PROCEDURE — 37000009 HC ANESTHESIA EA ADD 15 MINS: Performed by: NEUROLOGICAL SURGERY

## 2018-01-31 PROCEDURE — 71000033 HC RECOVERY, INTIAL HOUR: Performed by: NEUROLOGICAL SURGERY

## 2018-01-31 PROCEDURE — 88307 TISSUE EXAM BY PATHOLOGIST: CPT | Performed by: PATHOLOGY

## 2018-01-31 PROCEDURE — 37000008 HC ANESTHESIA 1ST 15 MINUTES: Performed by: NEUROLOGICAL SURGERY

## 2018-01-31 PROCEDURE — 86850 RBC ANTIBODY SCREEN: CPT

## 2018-01-31 PROCEDURE — 99223 1ST HOSP IP/OBS HIGH 75: CPT | Mod: ,,, | Performed by: PSYCHIATRY & NEUROLOGY

## 2018-01-31 DEVICE — SCREW UN3 1.5X4 NEW HEAD SD: Type: IMPLANTABLE DEVICE | Site: CRANIAL | Status: FUNCTIONAL

## 2018-01-31 DEVICE — GRAFT IMPLANT DURAFORM 3 X 3: Type: IMPLANTABLE DEVICE | Site: BRAIN | Status: FUNCTIONAL

## 2018-01-31 DEVICE — PLATE BONE 2X2 HOLE SM BOX: Type: IMPLANTABLE DEVICE | Site: CRANIAL | Status: FUNCTIONAL

## 2018-01-31 RX ORDER — SODIUM CHLORIDE 9 MG/ML
INJECTION, SOLUTION INTRAVENOUS CONTINUOUS
Status: DISCONTINUED | OUTPATIENT
Start: 2018-01-31 | End: 2018-02-01

## 2018-01-31 RX ORDER — ONDANSETRON 2 MG/ML
4 INJECTION INTRAMUSCULAR; INTRAVENOUS EVERY 12 HOURS PRN
Status: DISCONTINUED | OUTPATIENT
Start: 2018-01-31 | End: 2018-02-03 | Stop reason: HOSPADM

## 2018-01-31 RX ORDER — LEVETIRACETAM 10 MG/ML
1000 INJECTION INTRAVASCULAR EVERY 12 HOURS
Status: DISCONTINUED | OUTPATIENT
Start: 2018-01-31 | End: 2018-01-31 | Stop reason: HOSPADM

## 2018-01-31 RX ORDER — ONDANSETRON 2 MG/ML
INJECTION INTRAMUSCULAR; INTRAVENOUS
Status: DISCONTINUED | OUTPATIENT
Start: 2018-01-31 | End: 2018-01-31

## 2018-01-31 RX ORDER — ACETAMINOPHEN 10 MG/ML
INJECTION, SOLUTION INTRAVENOUS
Status: DISCONTINUED | OUTPATIENT
Start: 2018-01-31 | End: 2018-01-31

## 2018-01-31 RX ORDER — MUPIROCIN 20 MG/G
OINTMENT TOPICAL
Status: DISCONTINUED | OUTPATIENT
Start: 2018-01-31 | End: 2018-01-31 | Stop reason: HOSPADM

## 2018-01-31 RX ORDER — SODIUM CHLORIDE 9 MG/ML
INJECTION, SOLUTION INTRAVENOUS CONTINUOUS
Status: DISCONTINUED | OUTPATIENT
Start: 2018-01-31 | End: 2018-01-31

## 2018-01-31 RX ORDER — NICARDIPINE HYDROCHLORIDE 0.2 MG/ML
INJECTION INTRAVENOUS CONTINUOUS PRN
Status: DISCONTINUED | OUTPATIENT
Start: 2018-01-31 | End: 2018-01-31

## 2018-01-31 RX ORDER — VALSARTAN 40 MG/1
80 TABLET ORAL EVERY MORNING
Status: DISCONTINUED | OUTPATIENT
Start: 2018-01-31 | End: 2018-02-03 | Stop reason: HOSPADM

## 2018-01-31 RX ORDER — DEXAMETHASONE SODIUM PHOSPHATE 4 MG/ML
4 INJECTION, SOLUTION INTRA-ARTICULAR; INTRALESIONAL; INTRAMUSCULAR; INTRAVENOUS; SOFT TISSUE EVERY 6 HOURS
Status: DISCONTINUED | OUTPATIENT
Start: 2018-01-31 | End: 2018-02-03 | Stop reason: HOSPADM

## 2018-01-31 RX ORDER — FENTANYL CITRATE 50 UG/ML
INJECTION, SOLUTION INTRAMUSCULAR; INTRAVENOUS
Status: DISCONTINUED | OUTPATIENT
Start: 2018-01-31 | End: 2018-01-31

## 2018-01-31 RX ORDER — LIDOCAINE HCL/PF 100 MG/5ML
SYRINGE (ML) INTRAVENOUS
Status: DISCONTINUED | OUTPATIENT
Start: 2018-01-31 | End: 2018-01-31

## 2018-01-31 RX ORDER — DEXAMETHASONE 4 MG/1
4 TABLET ORAL EVERY 6 HOURS
Status: DISCONTINUED | OUTPATIENT
Start: 2018-01-31 | End: 2018-01-31

## 2018-01-31 RX ORDER — LEVETIRACETAM 500 MG/1
500 TABLET ORAL EVERY 12 HOURS
Status: DISCONTINUED | OUTPATIENT
Start: 2018-01-31 | End: 2018-02-03 | Stop reason: HOSPADM

## 2018-01-31 RX ORDER — ACETAMINOPHEN 325 MG/1
650 TABLET ORAL EVERY 4 HOURS PRN
Status: DISCONTINUED | OUTPATIENT
Start: 2018-01-31 | End: 2018-02-03 | Stop reason: HOSPADM

## 2018-01-31 RX ORDER — NICARDIPINE HYDROCHLORIDE 0.2 MG/ML
2.5 INJECTION INTRAVENOUS CONTINUOUS
Status: DISCONTINUED | OUTPATIENT
Start: 2018-01-31 | End: 2018-02-01

## 2018-01-31 RX ORDER — ACETAMINOPHEN 650 MG/1
650 SUPPOSITORY RECTAL EVERY 6 HOURS PRN
Status: DISCONTINUED | OUTPATIENT
Start: 2018-01-31 | End: 2018-02-03 | Stop reason: HOSPADM

## 2018-01-31 RX ORDER — BACITRACIN 50000 [IU]/1
INJECTION, POWDER, FOR SOLUTION INTRAMUSCULAR
Status: DISCONTINUED | OUTPATIENT
Start: 2018-01-31 | End: 2018-01-31 | Stop reason: HOSPADM

## 2018-01-31 RX ORDER — DEXAMETHASONE SODIUM PHOSPHATE 4 MG/ML
INJECTION, SOLUTION INTRA-ARTICULAR; INTRALESIONAL; INTRAMUSCULAR; INTRAVENOUS; SOFT TISSUE
Status: DISCONTINUED | OUTPATIENT
Start: 2018-01-31 | End: 2018-01-31

## 2018-01-31 RX ORDER — PROPOFOL 10 MG/ML
VIAL (ML) INTRAVENOUS
Status: DISCONTINUED | OUTPATIENT
Start: 2018-01-31 | End: 2018-01-31

## 2018-01-31 RX ORDER — FENOFIBRATE 134 MG/1
134 CAPSULE ORAL
Status: DISCONTINUED | OUTPATIENT
Start: 2018-02-01 | End: 2018-02-03 | Stop reason: HOSPADM

## 2018-01-31 RX ORDER — MANNITOL 250 MG/ML
INJECTION, SOLUTION INTRAVENOUS
Status: DISCONTINUED | OUTPATIENT
Start: 2018-01-31 | End: 2018-01-31

## 2018-01-31 RX ORDER — ROCURONIUM BROMIDE 10 MG/ML
INJECTION, SOLUTION INTRAVENOUS
Status: DISCONTINUED | OUTPATIENT
Start: 2018-01-31 | End: 2018-01-31

## 2018-01-31 RX ORDER — PROPRANOLOL HYDROCHLORIDE 80 MG/1
80 CAPSULE, EXTENDED RELEASE ORAL EVERY MORNING
Status: DISCONTINUED | OUTPATIENT
Start: 2018-01-31 | End: 2018-02-03 | Stop reason: HOSPADM

## 2018-01-31 RX ORDER — BACITRACIN ZINC 500 UNIT/G
OINTMENT (GRAM) TOPICAL
Status: DISCONTINUED | OUTPATIENT
Start: 2018-01-31 | End: 2018-01-31 | Stop reason: HOSPADM

## 2018-01-31 RX ORDER — PROPOFOL 10 MG/ML
VIAL (ML) INTRAVENOUS CONTINUOUS PRN
Status: DISCONTINUED | OUTPATIENT
Start: 2018-01-31 | End: 2018-01-31

## 2018-01-31 RX ORDER — LIDOCAINE HYDROCHLORIDE AND EPINEPHRINE 10; 10 MG/ML; UG/ML
INJECTION, SOLUTION INFILTRATION; PERINEURAL
Status: DISCONTINUED | OUTPATIENT
Start: 2018-01-31 | End: 2018-01-31 | Stop reason: HOSPADM

## 2018-01-31 RX ORDER — ATORVASTATIN CALCIUM 20 MG/1
20 TABLET, FILM COATED ORAL EVERY MORNING
Status: DISCONTINUED | OUTPATIENT
Start: 2018-01-31 | End: 2018-02-03 | Stop reason: HOSPADM

## 2018-01-31 RX ORDER — HEPARIN SODIUM 5000 [USP'U]/ML
5000 INJECTION, SOLUTION INTRAVENOUS; SUBCUTANEOUS EVERY 8 HOURS
Status: DISCONTINUED | OUTPATIENT
Start: 2018-01-31 | End: 2018-02-03 | Stop reason: HOSPADM

## 2018-01-31 RX ORDER — MIDAZOLAM HYDROCHLORIDE 5 MG/ML
INJECTION INTRAMUSCULAR; INTRAVENOUS
Status: DISCONTINUED | OUTPATIENT
Start: 2018-01-31 | End: 2018-01-31

## 2018-01-31 RX ORDER — ACETAMINOPHEN 325 MG/1
650 TABLET ORAL EVERY 6 HOURS PRN
Status: DISCONTINUED | OUTPATIENT
Start: 2018-01-31 | End: 2018-02-03 | Stop reason: HOSPADM

## 2018-01-31 RX ORDER — MUPIROCIN 20 MG/G
1 OINTMENT TOPICAL 2 TIMES DAILY
Status: DISCONTINUED | OUTPATIENT
Start: 2018-01-31 | End: 2018-02-03 | Stop reason: HOSPADM

## 2018-01-31 RX ORDER — PHENYLEPHRINE HYDROCHLORIDE 10 MG/ML
INJECTION INTRAVENOUS
Status: DISCONTINUED | OUTPATIENT
Start: 2018-01-31 | End: 2018-01-31

## 2018-01-31 RX ORDER — PANTOPRAZOLE SODIUM 40 MG/10ML
40 INJECTION, POWDER, LYOPHILIZED, FOR SOLUTION INTRAVENOUS DAILY
Status: DISCONTINUED | OUTPATIENT
Start: 2018-01-31 | End: 2018-02-01

## 2018-01-31 RX ORDER — SODIUM CHLORIDE 9 MG/ML
INJECTION, SOLUTION INTRAVENOUS CONTINUOUS PRN
Status: DISCONTINUED | OUTPATIENT
Start: 2018-01-31 | End: 2018-01-31

## 2018-01-31 RX ORDER — MUPIROCIN 20 MG/G
1 OINTMENT TOPICAL
Status: COMPLETED | OUTPATIENT
Start: 2018-01-31 | End: 2018-01-31

## 2018-01-31 RX ORDER — SUCCINYLCHOLINE CHLORIDE 20 MG/ML
INJECTION INTRAMUSCULAR; INTRAVENOUS
Status: DISCONTINUED | OUTPATIENT
Start: 2018-01-31 | End: 2018-01-31

## 2018-01-31 RX ADMIN — CEFTRIAXONE 2 G: 2 INJECTION, SOLUTION INTRAVENOUS at 09:01

## 2018-01-31 RX ADMIN — DEXAMETHASONE SODIUM PHOSPHATE 12 MG: 4 INJECTION, SOLUTION INTRAMUSCULAR; INTRAVENOUS at 09:01

## 2018-01-31 RX ADMIN — PROPOFOL 150 MCG/KG/MIN: 10 INJECTION, EMULSION INTRAVENOUS at 09:01

## 2018-01-31 RX ADMIN — PHENYLEPHRINE HYDROCHLORIDE 100 MCG: 10 INJECTION INTRAVENOUS at 09:01

## 2018-01-31 RX ADMIN — SODIUM CHLORIDE, SODIUM GLUCONATE, SODIUM ACETATE, POTASSIUM CHLORIDE, MAGNESIUM CHLORIDE, SODIUM PHOSPHATE, DIBASIC, AND POTASSIUM PHOSPHATE: .53; .5; .37; .037; .03; .012; .00082 INJECTION, SOLUTION INTRAVENOUS at 02:01

## 2018-01-31 RX ADMIN — NICARDIPINE HYDROCHLORIDE 5 MG/HR: 0.2 INJECTION, SOLUTION INTRAVENOUS at 02:01

## 2018-01-31 RX ADMIN — PROPOFOL 150 MG: 10 INJECTION, EMULSION INTRAVENOUS at 08:01

## 2018-01-31 RX ADMIN — ROCURONIUM BROMIDE 10 MG: 10 INJECTION, SOLUTION INTRAVENOUS at 08:01

## 2018-01-31 RX ADMIN — PHENYLEPHRINE HYDROCHLORIDE 100 MCG: 10 INJECTION INTRAVENOUS at 01:01

## 2018-01-31 RX ADMIN — MUPIROCIN 1 G: 20 OINTMENT TOPICAL at 06:01

## 2018-01-31 RX ADMIN — SODIUM CHLORIDE, SODIUM GLUCONATE, SODIUM ACETATE, POTASSIUM CHLORIDE, MAGNESIUM CHLORIDE, SODIUM PHOSPHATE, DIBASIC, AND POTASSIUM PHOSPHATE: .53; .5; .37; .037; .03; .012; .00082 INJECTION, SOLUTION INTRAVENOUS at 09:01

## 2018-01-31 RX ADMIN — MANNITOL 12.5 G: 250 INJECTION, SOLUTION INTRAVENOUS at 10:01

## 2018-01-31 RX ADMIN — SODIUM CHLORIDE, SODIUM GLUCONATE, SODIUM ACETATE, POTASSIUM CHLORIDE, MAGNESIUM CHLORIDE, SODIUM PHOSPHATE, DIBASIC, AND POTASSIUM PHOSPHATE: .53; .5; .37; .037; .03; .012; .00082 INJECTION, SOLUTION INTRAVENOUS at 01:01

## 2018-01-31 RX ADMIN — PANTOPRAZOLE SODIUM 40 MG: 40 INJECTION, POWDER, FOR SOLUTION INTRAVENOUS at 04:01

## 2018-01-31 RX ADMIN — LEVETIRACETAM 1000 MG: 10 INJECTION INTRAVENOUS at 09:01

## 2018-01-31 RX ADMIN — PHENYLEPHRINE HYDROCHLORIDE 100 MCG: 10 INJECTION INTRAVENOUS at 08:01

## 2018-01-31 RX ADMIN — ACETAMINOPHEN 650 MG: 325 TABLET, FILM COATED ORAL at 05:01

## 2018-01-31 RX ADMIN — LEVETIRACETAM 500 MG: 500 TABLET ORAL at 09:01

## 2018-01-31 RX ADMIN — SUCCINYLCHOLINE CHLORIDE 120 MG: 20 INJECTION, SOLUTION INTRAMUSCULAR; INTRAVENOUS at 08:01

## 2018-01-31 RX ADMIN — PROPOFOL: 10 INJECTION, EMULSION INTRAVENOUS at 12:01

## 2018-01-31 RX ADMIN — SODIUM CHLORIDE, SODIUM GLUCONATE, SODIUM ACETATE, POTASSIUM CHLORIDE, MAGNESIUM CHLORIDE, SODIUM PHOSPHATE, DIBASIC, AND POTASSIUM PHOSPHATE: .53; .5; .37; .037; .03; .012; .00082 INJECTION, SOLUTION INTRAVENOUS at 12:01

## 2018-01-31 RX ADMIN — SODIUM CHLORIDE: 0.9 INJECTION, SOLUTION INTRAVENOUS at 08:01

## 2018-01-31 RX ADMIN — FENTANYL CITRATE 50 MCG: 50 INJECTION, SOLUTION INTRAMUSCULAR; INTRAVENOUS at 01:01

## 2018-01-31 RX ADMIN — ACETAMINOPHEN 1000 MG: 10 INJECTION, SOLUTION INTRAVENOUS at 01:01

## 2018-01-31 RX ADMIN — ONDANSETRON 4 MG: 2 INJECTION INTRAMUSCULAR; INTRAVENOUS at 01:01

## 2018-01-31 RX ADMIN — MANNITOL 12.5 G: 250 INJECTION, SOLUTION INTRAVENOUS at 09:01

## 2018-01-31 RX ADMIN — PHENYLEPHRINE HYDROCHLORIDE 50 MCG: 10 INJECTION INTRAVENOUS at 10:01

## 2018-01-31 RX ADMIN — NICARDIPINE HYDROCHLORIDE 12.5 MG/HR: 0.2 INJECTION, SOLUTION INTRAVENOUS at 05:01

## 2018-01-31 RX ADMIN — PROPOFOL 125 MCG/KG/MIN: 10 INJECTION, EMULSION INTRAVENOUS at 08:01

## 2018-01-31 RX ADMIN — DEXAMETHASONE SODIUM PHOSPHATE 4 MG: 4 INJECTION, SOLUTION INTRAMUSCULAR; INTRAVENOUS at 05:01

## 2018-01-31 RX ADMIN — PHENYLEPHRINE HYDROCHLORIDE 0.25 MCG/KG/MIN: 10 INJECTION INTRAVENOUS at 09:01

## 2018-01-31 RX ADMIN — LEVETIRACETAM 500 MG: 10 INJECTION INTRAVENOUS at 09:01

## 2018-01-31 RX ADMIN — PROPOFOL: 10 INJECTION, EMULSION INTRAVENOUS at 10:01

## 2018-01-31 RX ADMIN — LIDOCAINE HYDROCHLORIDE 100 MG: 20 INJECTION, SOLUTION INTRAVENOUS at 08:01

## 2018-01-31 RX ADMIN — MIDAZOLAM 2 MG: 5 INJECTION INTRAMUSCULAR; INTRAVENOUS at 08:01

## 2018-01-31 RX ADMIN — SODIUM CHLORIDE: 0.9 INJECTION, SOLUTION INTRAVENOUS at 06:01

## 2018-01-31 RX ADMIN — NICARDIPINE HYDROCHLORIDE 7.5 MG/HR: 0.2 INJECTION, SOLUTION INTRAVENOUS at 09:01

## 2018-01-31 RX ADMIN — PROPOFOL 40 MG: 10 INJECTION, EMULSION INTRAVENOUS at 09:01

## 2018-01-31 RX ADMIN — REMIFENTANIL HYDROCHLORIDE 0.2 MCG/KG/MIN: 1 INJECTION, POWDER, LYOPHILIZED, FOR SOLUTION INTRAVENOUS at 08:01

## 2018-01-31 RX ADMIN — PROPOFOL 50 MG: 10 INJECTION, EMULSION INTRAVENOUS at 08:01

## 2018-01-31 RX ADMIN — HEPARIN SODIUM 5000 UNITS: 5000 INJECTION, SOLUTION INTRAVENOUS; SUBCUTANEOUS at 09:01

## 2018-01-31 RX ADMIN — FENTANYL CITRATE 150 MCG: 50 INJECTION, SOLUTION INTRAMUSCULAR; INTRAVENOUS at 08:01

## 2018-01-31 NOTE — PROGRESS NOTES
Pt now sitting up and starting to wake up from anesthesia moving all extremities spontaneously and nods appropriately to questions, passed GERMAINE, will continue to monitor.

## 2018-01-31 NOTE — INTERVAL H&P NOTE
The patient has been examined and the H&P has been reviewed:    I concur with the findings and no changes have occurred since H&P was written.     Anesthesia/Surgery risks, benefits and alternative options discussed and understood by patient/family, including possible need for peg/trach postoperatively           Active Hospital Problems    Diagnosis  POA    Cavernous malformation [Q28.3]  Yes      Resolved Hospital Problems    Diagnosis Date Resolved POA   No resolved problems to display.

## 2018-01-31 NOTE — ANESTHESIA PROCEDURE NOTES
Arterial    Diagnosis: cavernous hemangioma    Patient location during procedure: done in OR  Procedure start time: 1/31/2018 8:40 AM  Timeout: 1/31/2018 8:40 AM  Procedure end time: 1/31/2018 8:48 AM  Staffing  Anesthesiologist: FIFI TEJADA  Resident/CRNA: CARLO RUGGIERO  Performed: resident/CRNA and anesthesiologist   Anesthesiologist was present at the time of the procedure.  Preanesthetic Checklist  Completed: patient identified, site marked, surgical consent, pre-op evaluation, timeout performed, IV checked, risks and benefits discussed, monitors and equipment checked and anesthesia consent givenArterial  Skin Prep: alcohol swabs  Local Infiltration: none  Orientation: right  Location: radial  Catheter Size: 20 G  Catheter placement by Anatomical landmarks. Heme positive aspiration all ports.Insertion Attempts: 3 (1 attempt by anes resident on L radial, 1 attempt by anes resident on R radial, 1 attempt by anes attending on R radial)  Assessment  Dressing: secured with tape and tegaderm  Patient: Tolerated well

## 2018-01-31 NOTE — PROGRESS NOTES
Pt arrived from PACU, oral airway in place on simple face mask at 10L SATS low 90s, plan to place venti mask and obtain chest xray, pt HOB flat until 1630 due to lumbar drain removal, pt withdraws on all extremities except RUE at this time, pupils brisk and equal, maintaining SBP less than 140 on 7.5mg/hr of cardene, wife updated at bedside, will continue to monitor.

## 2018-01-31 NOTE — TRANSFER OF CARE
"Anesthesia Transfer of Care Note    Patient: Joel Deluca    Procedure(s) Performed: Procedure(s) (LRB):  RESECTION-TUMOR, Craniotomy with Stealth Navigation for cavernous malformation  (Left)    Patient location: PACU    Anesthesia Type: general    Transport from OR: Transported from OR on 6-10 L/min O2 by face mask with adequate spontaneous ventilation    Post pain: adequate analgesia    Post assessment: no apparent anesthetic complications    Post vital signs: stable    Level of consciousness: sedated    Nausea/Vomiting: no nausea/vomiting    Complications: none    Transfer of care protocol was followed      Last vitals:   Visit Vitals  /63 (BP Location: Right arm, Patient Position: Lying)   Pulse 96   Temp 37 °C (98.6 °F) (Axillary)   Resp 20   Ht 5' 6" (1.676 m)   Wt 95.3 kg (210 lb)   SpO2 (!) 93%   BMI 33.89 kg/m²     "

## 2018-01-31 NOTE — SUBJECTIVE & OBJECTIVE
Past Medical History:   Diagnosis Date    Cerebral cavernous malformation     Cervical disc disease with myelopathy     Hyperlipidemia     Hypertension      Past Surgical History:   Procedure Laterality Date    ANTERIOR CERVICAL DISCECTOMY W/ FUSION        No current facility-administered medications on file prior to encounter.      Current Outpatient Prescriptions on File Prior to Encounter   Medication Sig Dispense Refill    atorvastatin (LIPITOR) 20 MG tablet Take 1 tablet (20 mg total) by mouth once daily. (Patient taking differently: Take 20 mg by mouth every morning. ) 30 tablet 5    fenofibric acid (FIBRICOR) 135 mg CpDR Take 1 capsule (135 mg total) by mouth once daily. (Patient taking differently: Take 1 capsule by mouth every morning. ) 30 capsule 5    propranolol (INDERAL LA) 80 MG 24 hr capsule Take 1 capsule (80 mg total) by mouth once daily. (Patient taking differently: Take 80 mg by mouth every morning. ) 30 capsule 5    valsartan (DIOVAN) 80 MG tablet Take 1 tablet (80 mg total) by mouth once daily. (Patient taking differently: Take 80 mg by mouth every morning. ) 30 tablet 5      Allergies: Patient has no known allergies.    Family History   Problem Relation Age of Onset    Hypertension Mother     Heart disease Father     Hypertension Father      Social History   Substance Use Topics    Smoking status: Never Smoker    Smokeless tobacco: Never Used    Alcohol use Yes     Review of Systems Unable to assess due to level of consciousness post anesthesia.     Objective:     Vitals:    Temp: 99.1 °F (37.3 °C)  Pulse: 90  Rhythm: normal sinus rhythm  BP: 125/68  MAP (mmHg): 91  Resp: 18  SpO2: (!) 92 %  Oxygen Concentration (%): 55  O2 Device (Oxygen Therapy): venti mask    Temp  Min: 98.1 °F (36.7 °C)  Max: 99.1 °F (37.3 °C)  Pulse  Min: 73  Max: 96  BP  Min: 106/63  Max: 160/90  MAP (mmHg)  Min: 80  Max: 105  Resp  Min: 18  Max: 21  SpO2  Min: 92 %  Max: 100 %  Oxygen Concentration (%)   Min: 55  Max: 55    No intake/output data recorded.           Physical Exam   Physical Exam:  GA: Lethargic, Difficult to arouse, Post anethesia, Follows simple commands: ie wiggle toes, open eyes, comfortable, no acute distress.   HEENT: No scleral icterus or JVD.   Pulmonary: Clear to auscultation Anterior. No wheezing, crackles, or rhonchi.  Cardiac: RRR S1 & S2 w/o rubs/murmurs/gallops.   Abdominal: Bowel sounds present x 4. No appreciable hepatosplenomegaly.  Skin: No jaundice, rashes, or visible lesions.  Neuro:  --GCS: E4 V3 M5  --Mental Status: Lethargic, Difficult to arouse, Post anethesia, Follows simple commands: ie wiggle toes, open eyes,    --CN II-XII grossly intact.   --Pupils 4mm, PERRL.   --Corneal reflex, gag, cough intact.  --LUE strength: 3/5 minimal movement  --RUE strength: 4/5 Contracted  --LLE strength: 5/5 localizes to pain   --RLE strength: 5/5 localizes to pain   Unable to test gait due to level of consciousness.    Today I personally reviewed pertinent medications, lines/drains/airways, lab results, notably:

## 2018-01-31 NOTE — ANESTHESIA PREPROCEDURE EVALUATION
[facility]  Anesthesia Pre-Operative Evaluation         Patient Name: Joel Deluca  YOB: 1965  MRN: [id number]    SUBJECTIVE:     Pre-operative evaluation for Procedure(s) (LRB):  RESECTION-TUMOR cavernous malformation  (Left)  Scheduled for 1/31/2018    HPI 01/30/2018:  Joel Deluca is a 53 y.o. male with hx of HTN, HLD, cervical disc disease with myelopathy s/p ACDF.    Pt has hx of CN4 palsy and brainstem/kim cavernous malformation.  Pt has sensation of being wet on the R side of body, loss of balance, slurring, difficulty directing L hand, double vision.    MRI shows 1.8cm lesion in L posterior brainstem/kim with heterogenous signal suggesting a cavernous malformation.  Surgery recommending subtemporal approach for brainstem cavernoma resection with placement of lumbar drain.    Patient presents for the above procedure(s).    Prev airway:   No prior records in Epic or Legacy Documents.    Oxygen/Ventilation Requirements:  On room air       Patient Active Problem List   Diagnosis    Hyperlipidemia    Hypertension    Arthropathy of cervical facet joint    HTN (hypertension)    Cervical disc disease with myelopathy    Cerebral cavernous malformation       Review of patient's allergies indicates:  No Known Allergies    Outpatient Medications:  No current facility-administered medications on file prior to encounter.      Current Outpatient Prescriptions on File Prior to Encounter   Medication Sig Dispense Refill    atorvastatin (LIPITOR) 20 MG tablet Take 1 tablet (20 mg total) by mouth once daily. (Patient taking differently: Take 20 mg by mouth every morning. ) 30 tablet 5    fenofibric acid (FIBRICOR) 135 mg CpDR Take 1 capsule (135 mg total) by mouth once daily. (Patient taking differently: Take 1 capsule by mouth every morning. ) 30 capsule 5    propranolol (INDERAL LA) 80 MG 24 hr capsule Take 1 capsule (80 mg total) by mouth once daily. (Patient taking  differently: Take 80 mg by mouth every morning. ) 30 capsule 5    valsartan (DIOVAN) 80 MG tablet Take 1 tablet (80 mg total) by mouth once daily. (Patient taking differently: Take 80 mg by mouth every morning. ) 30 tablet 5        Current Inpatient Medications:      Past Surgical History:   Procedure Laterality Date    ANTERIOR CERVICAL DISCECTOMY W/ FUSION         Social History     Social History    Marital status:      Spouse name: N/A    Number of children: N/A    Years of education: N/A     Occupational History    Not on file.     Social History Main Topics    Smoking status: Never Smoker    Smokeless tobacco: Never Used    Alcohol use Yes    Drug use: No    Sexual activity: Yes     Partners: Female     Other Topics Concern    Not on file     Social History Narrative    No narrative on file       OBJECTIVE:     Weight:  Wt Readings from Last 4 Encounters:   18 95.4 kg (210 lb 6.4 oz)   17 95.7 kg (211 lb)   12/15/17 93 kg (205 lb)   17 93 kg (205 lb 0.4 oz)       Vital Signs Range (Last 24H):         CBC:   No results for input(s): WBC, RBC, HGB, HCT, PLT, MCV, MCH, MCHC in the last 72 hours.    CMP: No results for input(s): NA, K, CL, CO2, BUN, CREATININE, GLU, MG, PHOS, CALCIUM, ALBUMIN, PROT, ALKPHOS, ALT, AST, BILITOT in the last 72 hours.    INR:  No results for input(s): INR, PROTIME, APTT in the last 72 hours.    Diagnostic Studies:  MRI Brain w/wo Contrast 2018  Heterogeneous hemorrhagic lesion within the left kim again identified remains most compatible with cavernoma with associated developmental venous anomaly.    Continued mass effect on the cerebral aqueduct without evidence for hydrocephalus.    EK2018  Vent. Rate : 066 BPM     Atrial Rate : 066 BPM     P-R Int : 170 ms          QRS Dur : 104 ms      QT Int : 414 ms       P-R-T Axes : 028 062 038 degrees     QTc Int : 434 ms    Normal sinus rhythm  Normal ECG  No previous ECGs available     2D  Echo:  No results found for this or any previous visit.      ASSESSMENT/PLAN:         Anesthesia Evaluation    I have reviewed the Patient Summary Reports.    I have reviewed the Nursing Notes.   I have reviewed the Medications.     Review of Systems  Anesthesia Hx:  No problems with previous Anesthesia Denies Hx of Anesthetic complications  History of prior surgery of interest to airway management or planning: Previous anesthesia: General Denies Family Hx of Anesthesia complications.   Denies Personal Hx of Anesthesia complications.   Social:  Non-Smoker, Alcohol Use    Hematology/Oncology:  Hematology Normal   Oncology Normal     Cardiovascular:   Exercise tolerance: good Hypertension Denies Valvular problems/Murmurs.  Denies MI.  Denies CAD.    Denies CABG/stent.  Denies Dysrhythmias.   Denies Angina.     hyperlipidemia    Pulmonary:   Denies COPD.  Denies Asthma.  Denies Sleep Apnea.    Renal/:   Denies Chronic Renal Disease.     Hepatic/GI:   Denies GERD. Denies Liver Disease.    Neurological:   Denies CVA. Denies Seizures. sensation of being wet on the R side of body, loss of balance, slurring, difficulty directing L hand, double vision.   Endocrine:   Denies Diabetes. Denies Hypothyroidism.    Psych:  Psychiatric Normal           Physical Exam  General:  Well nourished, Obesity    Airway/Jaw/Neck:  Airway Findings: Mouth Opening: Normal Tongue: Normal  General Airway Assessment: Adult  Mallampati: II  TM Distance: Normal, at least 6 cm  Jaw/Neck Findings:  Neck ROM: Normal ROM  Neck Findings: Normal (hx of ACDF)    Eyes/Ears/Nose:  EYES/EARS/NOSE FINDINGS: Normal   Dental:  Dental Findings:    Chest/Lungs:  Chest/Lungs Findings: Clear to auscultation, Normal Respiratory Rate     Heart/Vascular:  Heart Findings: Rate: Normal  Rhythm: Regular Rhythm  Sounds: Normal  Heart murmur: negative       Mental Status:  Mental Status Findings:  Cooperative, Alert and Oriented         Anesthesia Plan  Type of  Anesthesia, risks & benefits discussed:  Anesthesia Type:  general  Patient's Preference: general  Intra-op Monitoring Plan: standard ASA monitors and arterial line  Intra-op Monitoring Plan Comments:   Post Op Pain Control Plan: multimodal analgesia, IV/PO Opioids PRN and per primary service following discharge from PACU  Post Op Pain Control Plan Comments:   Induction:   IV  Beta Blocker:  Patient is on a Beta-Blocker and has received one dose within the past 24 hours (No further documentation required).       Informed Consent: Patient understands risks and agrees with Anesthesia plan.  Questions answered. Anesthesia consent signed with patient.  ASA Score: 3     Day of Surgery Review of History & Physical: I have interviewed and examined the patient. I have reviewed the patient's H&P dated:  There are no significant changes.  H&P update referred to the surgeon.         Ready For Surgery From Anesthesia Perspective.

## 2018-01-31 NOTE — NURSING TRANSFER
Nursing Transfer Note      1/31/2018     Transfer To: [id number]    Transfer via bed    Transfer with O2, cardiac monitoring    Transported by Nurse x 2, Resp,  and MD    Medicines sent: Yes, Cardene gtt    Chart send with patient: Yes

## 2018-01-31 NOTE — OP NOTE
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   Navigation guided procedure.   Left sided subtemporal, trans-tentorial approach.   Brain stem cavernous malformation removal.   Microscopic technique.   Tegmen defect repair, using muscle graft, DuraSeal, and DuraGen.  Neuromonitoring using SSEP, MEP and BAERs.     Surgeon(s) and Role:     * Mine Alvarenga MD - Resident - Assisting     * Avery Mejia MD - Primary     * Surya Nolasco MD Co-surgeon (a second experienced neurosurgeon was needed at all times, due to the complexity of the surgical approach to the cavernoma, the critical positioning of the cavernoma within the brainstem, and all the risks implicated with surgical resection of the brain stem cavernoma)    ANESTHESIA: General endotracheal anesthesia.    ESTIMATED BLOOD LOSS: 200 cc    IVF: 4.5 LT    UO: 3 LT    CLINICAL HISTORY AND INDICATIONS FOR SURGERY: Joel Deluca is a 53 y.o. male who developed signs and symptoms of 4th nerve palsy. Preoperative imaging demonstrated a mesencephalic cavernoma that had grown in size in 6 month follow up.  Case was discussed in multidisciplinary neuro radiology conference.  Given the progression of the symptoms, surgery was offered and after discussing all the attendant risks, benefits, and potential complication of surgery. he  wanted to proceed with surgery and consented to surgery.     OPERATIVE PROCEDURE: The patient was brought into the Operating Room,   identified and general anesthesia was induced using endotracheal intubation. We   set up for intraoperative neuromonitoring and baseline parameters   were obtained. IV lines, reynoso catheter and an arterial line were placed.     Timeout was carried, antibiotics were given.  Patient was turn in left lateral decubitus, patient knees  were flexed.  Patient's lower lumbar spine was prepped and draped under sterile conditions.  Using anatomical landmarks, a Codman lumbar drain was inserted in two attempts.  Lumbar drain was placed, to drain spinal fluid, and help out with brain relaxation during the surgery.  40 cc of a spinal fluid were drained.    At this point, with her positioning the patient for surgery.  A shoulder roll was placed underneath the left shoulder.  All pressure points were padded.  Patient was strapped to the bed.  Head was turned to the right, for a sub-temporal approach, with a mastoid being the higher point in positioning. The navigation system was set up and the patient's anatomy was merged with the navigation system.  Neuro monitoring needles were placed.  The area of the incision was marked using the navigation system, to do a temporal-occipital craniotomy for a subtemporal approach. The skin was prepped and draped under sterile conditions.     A second Intraoperative time-out was carried.  60 g of mannitol and 12 mg of Decadron were given.  The area of the incision was infiltrated using 10 ml of lidocaine with epinephrine.    A number 10 blade knife was used to create the incision. Fascia and temporalis muscle were opened using a Bovie cautery. Periosteal elevator was used to elevate periosteum, fascia and muscle.  Fishhooks were placed to retract soft tissue.  Two Bur holes were created using a high speed drill over the temporal squama, and back over to occipital bone, on top of the transverse sinus. The high speed craniotomy was used to turn a craniotomy flap. The dura was  from the bone using a Penfield 3 elevator.  There is violation of the mastoid cells.  Cells were coagulated using monopolar cautery, and then they were waxed.  There was a small tegmen defect that was left alone for repair during closure.  Dura was opened using a #15 blade and Metzenbaum. Dura was retracted using 4-0 Nurolons.     The  microscope was brought into the field, and using microscopic technique the Greenberger retractor was used to gently elevate the temporal lobe from the skull base.  Bridging veins to the sphenoid-palatine sinus were coagulated and divided.  Retractors were advanced, until we saw the tentorial edge.  Vein of Tanmay was left intact.  Using microscopic technique, the fourth nerve was identified, and followed up to its insertion in the tentorial incisura.  Using the bipolar, the tentorial membrane was coagulated, and then divided to create a space to visualize the posterior aspect of the superior and inferior colliculi.  4-0 Nurolon was used to retract the tentorial membrane after it was divided.  At this point, using microscopic technique, the superior cerebellar artery was mobilized from its arachnoid attachments, and gentle pressure was put over the cerebellar tissue, to be able to visualize the inferior colliculi.      The area of the cardinal malformation was identified, using microscopic technique, the diameter shape arachnoid knife was used to do a corticectomy over the area of the cavernoma.  Brainstem was entered, and the cavernous malformation was visualized.  The old blood from within the cavernous malformation was drained.  Using microscopic technique, with a series of Rothon dissectors, pituitary forceps and bipolars, the cavernous malformation was a slowly removed from adjacent brainstem tissue.    The cavernoma borders were demarcated and slowly removed in a piece meal fashion.  We spent an additional 2 hours to slowly removed the cavernoma tissue from the brainstem, without creating damage over the brain tissue.  Bipolars and Surgicel were used to create hemostasis. Surgicels were used to cover the tumor cavity and create hemostasis.     Microscope was removed and attention was put to closure. Dura was closed using 4-0 Nurolons.  Mastoid air cells were waxed.  There was a tegmen defect, that was  visualized, mucosa was coagulated using monopolar Bovie.  We harvested a piece of muscle, and is slowly fed it the muscle into the tegmen defect.  At this point, we used DuraSeal, to spray over the tegmen defect, and the previously waxed mastoid cells.  A sheath of DuraGen was placed over the dura. The bone was plated using a [employer] system with square plates and 4 mm screws. Muscle, fascia and Galea were closed in layers using 3-0 Vicryls. The skin was closed using using staples and the wound was dressed with antibiotic ointment and telfa.     All instrument and needle counts were confirmed x3. The patient was awaken from anesthesia and was in her baseline neurological condition. The patient was transferred to the Neuro-ICU in stable condition.     Montana Molina and Gaurav were present during the entire procedure.

## 2018-01-31 NOTE — BRIEF OP NOTE
[facility]  Neurosurgery  Operative Note    SUMMARY      Date of Procedure: 1/31/2018     Procedure: Procedure(s) (LRB):  RESECTION-TUMOR, Craniotomy with Stealth Navigation for cavernous malformation  (Left)     Surgeon(s) and Role:     * Mine Alvarenga MD - Resident - Assisting     * Avery Mejia MD - Primary     * Surya Nolasco MD        Pre-Operative Diagnosis: Cerebral cavernous malformation [Q28.3]    Post-Operative Diagnosis: Post-Op Diagnosis Codes:     * Cerebral cavernous malformation [Q28.3]    Anesthesia: General    Technical Procedures Used: subtemporal craniotomy for resection of brainstem cavernoma     Description of the Findings of the Procedure: see dictation    Significant Surgical Tasks Conducted by the Assistant(s), if Applicable: resident    Complications: No    Estimated Blood Loss (EBL): * No values recorded between 1/31/2018  9:51 AM and 1/31/2018  2:18 PM *           Specimens:   Specimen (12h ago through future)    Start     Ordered    01/31/18 1307  Specimen to Pathology - Surgery  Once     Comments:  1. Brain stem lesion--Permanent      01/31/18 1306           Implants:   Implant Name Type Inv. Item Serial No.  Lot No. LRB No. Used   SCREW UN3 1.5X4 NEW HEAD SD - [id number]  SCREW UN3 1.5X4 NEW HEAD SD  [employer] Interior Define DESHAWN.  Left 12   GRAFT IMPLANT DURAFORM 3 X 3 - [id number]  GRAFT IMPLANT DURAFORM 3 X 3  [employer] [id number] Left 1   PLATE BONE 2X2 HOLE SM BOX - [id number]   PLATE BONE 2X2 HOLE SM BOX   [employer]I Interior Define DESHAWN.   Left 3              Condition: Good    Disposition: ICU - extubated and stable.    Attestation: Dr. Molina was present and scrubbed for the entire procedure.

## 2018-01-31 NOTE — HOSPITAL COURSE
1/31 s/p resection of brainstem cavernoma   2/2: repeat CTH: Postoperative changes of recent left midbrain cavernous malformation resection without apparent complication  Ok to transfer to [facility]

## 2018-01-31 NOTE — HPI
Joel Deluca is a 53 year old male with hx of HTN, HLD, cervical disc disease with myelopathy s/p ACDF, CN4 palsy who presents to [facility] s/p resection of brainstem cavernoma. Per the chart, patient has had sensation of being wet on the R side of body, loss of balance, slurring, difficulty directing left hand, and double vision. Lumbar drain removed rea op.  Subgaleal drain in place.

## 2018-01-31 NOTE — PROGRESS NOTES
POC reviewed with Pt prior to procedure; understanding verbalized; all questions/concerns addressed. Awaiting anesthesia consent and h&p update prior to case start.

## 2018-02-01 LAB
ALBUMIN SERPL BCP-MCNC: 3.2 G/DL
ALP SERPL-CCNC: 45 U/L
ALT SERPL W/O P-5'-P-CCNC: 24 U/L
ANION GAP SERPL CALC-SCNC: 10 MMOL/L
AST SERPL-CCNC: 23 U/L
BASOPHILS # BLD AUTO: 0.01 K/UL
BASOPHILS NFR BLD: 0.1 %
BILIRUB SERPL-MCNC: 0.4 MG/DL
BUN SERPL-MCNC: 14 MG/DL
CALCIUM SERPL-MCNC: 8.3 MG/DL
CHLORIDE SERPL-SCNC: 109 MMOL/L
CO2 SERPL-SCNC: 22 MMOL/L
CREAT SERPL-MCNC: 0.9 MG/DL
DIFFERENTIAL METHOD: ABNORMAL
EOSINOPHIL # BLD AUTO: 0 K/UL
EOSINOPHIL NFR BLD: 0 %
ERYTHROCYTE [DISTWIDTH] IN BLOOD BY AUTOMATED COUNT: 13.4 %
EST. GFR  (AFRICAN AMERICAN): >60 ML/MIN/1.73 M^2
EST. GFR  (NON AFRICAN AMERICAN): >60 ML/MIN/1.73 M^2
GLUCOSE SERPL-MCNC: 156 MG/DL
HCT VFR BLD AUTO: 29.4 %
HGB BLD-MCNC: 10 G/DL
IMM GRANULOCYTES # BLD AUTO: 0.06 K/UL
IMM GRANULOCYTES NFR BLD AUTO: 0.4 %
LYMPHOCYTES # BLD AUTO: 1 K/UL
LYMPHOCYTES NFR BLD: 7 %
MAGNESIUM SERPL-MCNC: 1.7 MG/DL
MAGNESIUM SERPL-MCNC: 2.3 MG/DL
MCH RBC QN AUTO: 29.9 PG
MCHC RBC AUTO-ENTMCNC: 34 G/DL
MCV RBC AUTO: 88 FL
MONOCYTES # BLD AUTO: 0.4 K/UL
MONOCYTES NFR BLD: 2.8 %
NEUTROPHILS # BLD AUTO: 12.9 K/UL
NEUTROPHILS NFR BLD: 89.7 %
NRBC BLD-RTO: 0 /100 WBC
PHOSPHATE SERPL-MCNC: 2 MG/DL
PHOSPHATE SERPL-MCNC: 2.4 MG/DL
PLATELET # BLD AUTO: 232 K/UL
PMV BLD AUTO: 8.5 FL
POCT GLUCOSE: 205 MG/DL (ref 70–110)
POTASSIUM SERPL-SCNC: 3.6 MMOL/L
POTASSIUM SERPL-SCNC: 3.8 MMOL/L
PROT SERPL-MCNC: 6.4 G/DL
RBC # BLD AUTO: 3.35 M/UL
SODIUM SERPL-SCNC: 141 MMOL/L
WBC # BLD AUTO: 14.41 K/UL

## 2018-02-01 PROCEDURE — 85025 COMPLETE CBC W/AUTO DIFF WBC: CPT

## 2018-02-01 PROCEDURE — 25000003 PHARM REV CODE 250: Performed by: PHYSICIAN ASSISTANT

## 2018-02-01 PROCEDURE — 63600175 PHARM REV CODE 636 W HCPCS: Performed by: PHYSICIAN ASSISTANT

## 2018-02-01 PROCEDURE — 99233 SBSQ HOSP IP/OBS HIGH 50: CPT | Mod: ,,, | Performed by: PHYSICIAN ASSISTANT

## 2018-02-01 PROCEDURE — 84132 ASSAY OF SERUM POTASSIUM: CPT

## 2018-02-01 PROCEDURE — 97165 OT EVAL LOW COMPLEX 30 MIN: CPT

## 2018-02-01 PROCEDURE — C9113 INJ PANTOPRAZOLE SODIUM, VIA: HCPCS | Performed by: NEUROLOGICAL SURGERY

## 2018-02-01 PROCEDURE — 25000003 PHARM REV CODE 250: Performed by: NURSE PRACTITIONER

## 2018-02-01 PROCEDURE — 63600175 PHARM REV CODE 636 W HCPCS: Performed by: NEUROLOGICAL SURGERY

## 2018-02-01 PROCEDURE — 84100 ASSAY OF PHOSPHORUS: CPT

## 2018-02-01 PROCEDURE — 84100 ASSAY OF PHOSPHORUS: CPT | Mod: 91

## 2018-02-01 PROCEDURE — 20000000 HC ICU ROOM

## 2018-02-01 PROCEDURE — 97161 PT EVAL LOW COMPLEX 20 MIN: CPT

## 2018-02-01 PROCEDURE — 80053 COMPREHEN METABOLIC PANEL: CPT

## 2018-02-01 PROCEDURE — 83735 ASSAY OF MAGNESIUM: CPT

## 2018-02-01 PROCEDURE — 25000003 PHARM REV CODE 250: Performed by: NEUROLOGICAL SURGERY

## 2018-02-01 PROCEDURE — 83735 ASSAY OF MAGNESIUM: CPT | Mod: 91

## 2018-02-01 RX ORDER — MAGNESIUM SULFATE HEPTAHYDRATE 40 MG/ML
2 INJECTION, SOLUTION INTRAVENOUS
Status: DISCONTINUED | OUTPATIENT
Start: 2018-02-01 | End: 2018-02-03 | Stop reason: HOSPADM

## 2018-02-01 RX ORDER — SODIUM,POTASSIUM PHOSPHATES 280-250MG
2 POWDER IN PACKET (EA) ORAL
Status: DISCONTINUED | OUTPATIENT
Start: 2018-02-01 | End: 2018-02-03 | Stop reason: HOSPADM

## 2018-02-01 RX ORDER — POTASSIUM CHLORIDE 20 MEQ/15ML
40 SOLUTION ORAL
Status: DISCONTINUED | OUTPATIENT
Start: 2018-02-01 | End: 2018-02-03 | Stop reason: HOSPADM

## 2018-02-01 RX ORDER — MAGNESIUM SULFATE HEPTAHYDRATE 40 MG/ML
4 INJECTION, SOLUTION INTRAVENOUS
Status: DISCONTINUED | OUTPATIENT
Start: 2018-02-01 | End: 2018-02-03 | Stop reason: HOSPADM

## 2018-02-01 RX ORDER — AMOXICILLIN 250 MG
1 CAPSULE ORAL DAILY PRN
Status: DISCONTINUED | OUTPATIENT
Start: 2018-02-01 | End: 2018-02-01

## 2018-02-01 RX ORDER — HYDRALAZINE HYDROCHLORIDE 20 MG/ML
10 INJECTION INTRAMUSCULAR; INTRAVENOUS EVERY 4 HOURS PRN
Status: DISCONTINUED | OUTPATIENT
Start: 2018-02-01 | End: 2018-02-03 | Stop reason: HOSPADM

## 2018-02-01 RX ORDER — AMOXICILLIN 250 MG
1 CAPSULE ORAL 2 TIMES DAILY
Status: DISCONTINUED | OUTPATIENT
Start: 2018-02-01 | End: 2018-02-03 | Stop reason: HOSPADM

## 2018-02-01 RX ORDER — LABETALOL HYDROCHLORIDE 5 MG/ML
10 INJECTION, SOLUTION INTRAVENOUS EVERY 4 HOURS PRN
Status: DISCONTINUED | OUTPATIENT
Start: 2018-02-01 | End: 2018-02-03 | Stop reason: HOSPADM

## 2018-02-01 RX ORDER — FAMOTIDINE 20 MG/1
20 TABLET, FILM COATED ORAL 2 TIMES DAILY
Status: DISCONTINUED | OUTPATIENT
Start: 2018-02-02 | End: 2018-02-03 | Stop reason: HOSPADM

## 2018-02-01 RX ADMIN — MAGNESIUM SULFATE IN WATER 2 G: 40 INJECTION, SOLUTION INTRAVENOUS at 07:02

## 2018-02-01 RX ADMIN — LEVETIRACETAM 500 MG: 500 TABLET ORAL at 09:02

## 2018-02-01 RX ADMIN — POTASSIUM & SODIUM PHOSPHATES POWDER PACK 280-160-250 MG 2 PACKET: 280-160-250 PACK at 05:02

## 2018-02-01 RX ADMIN — STANDARDIZED SENNA CONCENTRATE AND DOCUSATE SODIUM 1 TABLET: 8.6; 5 TABLET, FILM COATED ORAL at 08:02

## 2018-02-01 RX ADMIN — POTASSIUM PHOSPHATE, MONOBASIC AND POTASSIUM PHOSPHATE, DIBASIC 30 MMOL: 224; 236 INJECTION, SOLUTION INTRAVENOUS at 08:02

## 2018-02-01 RX ADMIN — CEFTRIAXONE 2 G: 2 INJECTION, SOLUTION INTRAVENOUS at 09:02

## 2018-02-01 RX ADMIN — ATORVASTATIN CALCIUM 20 MG: 20 TABLET, FILM COATED ORAL at 06:02

## 2018-02-01 RX ADMIN — MUPIROCIN 1 G: 20 OINTMENT TOPICAL at 08:02

## 2018-02-01 RX ADMIN — MUPIROCIN 1 G: 20 OINTMENT TOPICAL at 09:02

## 2018-02-01 RX ADMIN — ACETAMINOPHEN 650 MG: 325 TABLET, FILM COATED ORAL at 06:02

## 2018-02-01 RX ADMIN — DEXAMETHASONE SODIUM PHOSPHATE 4 MG: 4 INJECTION, SOLUTION INTRAMUSCULAR; INTRAVENOUS at 05:02

## 2018-02-01 RX ADMIN — HYDRALAZINE HYDROCHLORIDE 10 MG: 20 INJECTION INTRAMUSCULAR; INTRAVENOUS at 05:02

## 2018-02-01 RX ADMIN — DEXAMETHASONE SODIUM PHOSPHATE 4 MG: 4 INJECTION, SOLUTION INTRAMUSCULAR; INTRAVENOUS at 12:02

## 2018-02-01 RX ADMIN — HEPARIN SODIUM 5000 UNITS: 5000 INJECTION, SOLUTION INTRAVENOUS; SUBCUTANEOUS at 05:02

## 2018-02-01 RX ADMIN — STANDARDIZED SENNA CONCENTRATE AND DOCUSATE SODIUM 1 TABLET: 8.6; 5 TABLET, FILM COATED ORAL at 12:02

## 2018-02-01 RX ADMIN — HEPARIN SODIUM 5000 UNITS: 5000 INJECTION, SOLUTION INTRAVENOUS; SUBCUTANEOUS at 03:02

## 2018-02-01 RX ADMIN — VALSARTAN 80 MG: 80 TABLET ORAL at 06:02

## 2018-02-01 RX ADMIN — FENOFIBRATE 134 MG: 134 CAPSULE ORAL at 08:02

## 2018-02-01 RX ADMIN — POTASSIUM CHLORIDE 40 MEQ: 20 SOLUTION ORAL at 05:02

## 2018-02-01 RX ADMIN — HEPARIN SODIUM 5000 UNITS: 5000 INJECTION, SOLUTION INTRAVENOUS; SUBCUTANEOUS at 09:02

## 2018-02-01 RX ADMIN — PANTOPRAZOLE SODIUM 40 MG: 40 INJECTION, POWDER, FOR SOLUTION INTRAVENOUS at 09:02

## 2018-02-01 RX ADMIN — LEVETIRACETAM 500 MG: 500 TABLET ORAL at 08:02

## 2018-02-01 RX ADMIN — PROPRANOLOL HYDROCHLORIDE 80 MG: 80 CAPSULE, EXTENDED RELEASE ORAL at 06:02

## 2018-02-01 NOTE — PLAN OF CARE
[id number] - [place], [place] - [address]  [address] [facility]  [place] [address]  Phone: [phone] Fax: [phone]    [id number] [place] - [address]  [address] [place] [address]  Phone: [phone] Fax: [phone]    This CM spoke with patient at bedside.        02/01/18 1343   Discharge Assessment   Assessment Type Discharge Planning Assessment   Confirmed/corrected address and phone number on facesheet? Yes   Assessment information obtained from? Patient   Expected Length of Stay (days) 3   Communicated expected length of stay with patient/caregiver yes   Prior to hospitilization cognitive status: Alert/Oriented   Prior to hospitalization functional status: Independent   Current cognitive status: Alert/Oriented   Current Functional Status: Needs Assistance   Facility Arrived From: (spouse transport from home.)   Lives With spouse   Able to Return to Prior Arrangements yes   Is patient able to care for self after discharge? Yes   Who are your caregiver(s) and their phone number(s)? (spouse Carmela Deluca [phone])   Patient's perception of discharge disposition home or selfcare   Readmission Within The Last 30 Days no previous admission in last 30 days   Patient currently being followed by outpatient case management? No   Patient currently receives any other outside agency services? No   Equipment Currently Used at Home none   Do you have any problems affording any of your prescribed medications? No   Is the patient taking medications as prescribed? yes   Does the patient have transportation home? Yes   Does the patient receive services at the [facility]? No   Discharge Plan A Home;Home Health   Discharge Plan B Home with family   Patient/Family In Agreement With Plan yes     Rosalina Walls RN/BSN/BAIRON  [phone]  [facility]

## 2018-02-01 NOTE — ANESTHESIA POSTPROCEDURE EVALUATION
"Anesthesia Post Evaluation    Patient: Joel Deluca    Procedure(s) Performed: Procedure(s) (LRB):  RESECTION-TUMOR, Craniotomy with Stealth Navigation for cavernous malformation  (Left)  CRANIOTOMY WITH STEALTH (Left)    Final Anesthesia Type: general  Patient location during evaluation: PACU  Patient participation: Yes- Able to Participate  Level of consciousness: awake and alert  Post-procedure vital signs: reviewed and stable  Pain management: adequate  Airway patency: patent  PONV status at discharge: No PONV  Anesthetic complications: no      Cardiovascular status: blood pressure returned to baseline  Respiratory status: unassisted  Hydration status: euvolemic  Follow-up not needed.        Visit Vitals  /62   Pulse 81   Temp 37.2 °C (99 °F) (Oral)   Resp 12   Ht 5' 6" (1.676 m)   Wt 95.3 kg (210 lb)   SpO2 96%   BMI 33.89 kg/m²       Pain/Shea Score: Pain Assessment Performed: Yes (2/1/2018  1:03 PM)  Presence of Pain: denies (2/1/2018  1:03 PM)  Pain Rating Prior to Med Admin: 3 (2/1/2018  6:31 AM)      "

## 2018-02-01 NOTE — ASSESSMENT & PLAN NOTE
S/p Brain Stem Cavernous Malformation Removal   --Lumbar drain placement removed post op  -- MRI 1/30 reveals hemorrhagic lesion  left dorsal elvie extension in cistern with mass effect on the cerebral aqueduct and cranial extension into the left midbrain. Cavernoma 1.7  X  1.5 X  1.6cm  -- Continue Dexamethasone 4 mg q6  -- Continue Keppra 500 mg BID   -- SBP goal less than 140  -- Pending further recommendations per NGSY

## 2018-02-01 NOTE — ANESTHESIA RELEASE NOTE
"Anesthesia Release from PACU Note    Patient: Joel Deluca    Procedure(s) Performed: Procedure(s) (LRB):  RESECTION-TUMOR, Craniotomy with Stealth Navigation for cavernous malformation  (Left)  CRANIOTOMY WITH STEALTH (Left)    Anesthesia type: general    Post pain: Adequate analgesia    Post assessment: no apparent anesthetic complications    Last Vitals:   Visit Vitals  /62   Pulse 81   Temp 37.2 °C (99 °F) (Oral)   Resp 12   Ht 5' 6" (1.676 m)   Wt 95.3 kg (210 lb)   SpO2 96%   BMI 33.89 kg/m²       Post vital signs: stable    Level of consciousness: awake, alert  and oriented    Nausea/Vomiting: no nausea/no vomiting    Complications: none    Airway Patency: patent    Respiratory: unassisted    Cardiovascular: stable and blood pressure at baseline    Hydration: euvolemic     "

## 2018-02-01 NOTE — PLAN OF CARE
Problem: Patient Care Overview  Goal: Plan of Care Review  POC reviewed with pt and spouse at 0500. Pt verbalized understanding. Questions and concerns addressed. No acute events overnight. Pt progressing toward goals. Will continue to monitor. See flowsheets for full assessment and VS info

## 2018-02-01 NOTE — ASSESSMENT & PLAN NOTE
--Cardene gtt  -- SBP goal less than 140   --Continue Propanalol 80 mg daily   -- Continue Valsartan  80 mg daily

## 2018-02-01 NOTE — ASSESSMENT & PLAN NOTE
52 yo male with pmh of mesencephalic cavernous vascular malformation now s/p craniotomy for subtemporal transtentorial resection of mass (1/31).    --Continue care per primary team.  --Encourage ambulation with PTOT.  --Advance diet as tolerated.  --We will continue to monitor closely, please contact us with any questions or concerns.

## 2018-02-01 NOTE — ASSESSMENT & PLAN NOTE
POD 1 s/p brainstem cavernoma resection   Subgaleal drain in place  Lumbar drain removed post op   Dexamethasone 4 mg q 6 h  Keppra 500 mg bid   -140

## 2018-02-01 NOTE — PROGRESS NOTES
[facility]  Neurocritical Care  Progress Note    Admit Date: 1/31/2018  Service Date: 02/01/2018  Length of Stay: 1    Subjective:     Chief Complaint: Cerebral cavernous malformation    History of Present Illness: Joel Deluca is a 53 year old male with hx of HTN, HLD, cervical disc disease with myelopathy s/p ACDF, CN4 palsy who presents to [facility] s/p resection of brainstem cavernoma. Per the chart, patient  has had sensation of being wet on the R side of body, loss of balance, slurring, difficulty directing left hand, and double vision. Lumbar drain removed rae op.  Subgaleal drain in place.     Hospital Course: 1/31 s/p resection of brainstem cavernoma     Interval History: Subgaleal drain in place.  Continue dex and keppra.  Nicardipine weaned off.      Review of Systems: + diplopia     Vitals:   Temp: 99 °F (37.2 °C)  Pulse: 81  Rhythm: normal sinus rhythm  BP: 116/62  MAP (mmHg): 83  Resp: 12  SpO2: 96 %  O2 Device (Oxygen Therapy): nasal cannula    Temp  Min: 98.6 °F (37 °C)  Max: 99.1 °F (37.3 °C)  Pulse  Min: 78  Max: 100  BP  Min: 106/63  Max: 135/71  MAP (mmHg)  Min: 80  Max: 96  Resp  Min: 10  Max: 28  SpO2  Min: 90 %  Max: 97 %  Oxygen Concentration (%)  Min: 55  Max: 55    01/31 0701 - 02/01 0700  In: 5827.1 [I.V.:5777.1]  Out: 5140 [Urine:5050; Drains:90]         Examination:   Constitutional: Well-nourished and -developed. No apparent distress.   Eyes: Conjunctiva clear, anicteric. Lids no lesions.  Head/Ears/Nose/Mouth/Throat/Neck: Moist mucous membranes. External ears, nose atraumatic.   Cardiovascular: Regular rhythm. No murmurs. No leg edema.  Respiratory: Comfortable respirations. Clear to auscultation.  Gastrointestinal: No hernia. Soft, nondistended, nontender. + bowel sounds.    Neurologic:  -GCS E4V5M6  -Alert. Oriented to person, place, and time. Speech fluent. Follows commands.  -Motor WHITE   -Sensation intact  -L ptosis   -      Medications:   Continuous  Scheduled  atorvastatin 20 mg QAM   cefTRIAXone (ROCEPHIN) IVPB 2 g Q12H   dexamethasone 4 mg Q6H   [START ON 2/2/2018] famotidine 20 mg BID   fenofibrate micronized 134 mg Daily with breakfast   heparin (porcine) 5,000 Units Q8H   levETIRAcetam 500 mg Q12H   mupirocin 1 g BID   propranolol 80 mg QAM   senna-docusate 8.6-50 mg 1 tablet BID   valsartan 80 mg QAM   PRN  acetaminophen 650 mg Q6H PRN   acetaminophen 650 mg Q6H PRN   acetaminophen 650 mg Q4H PRN   magnesium sulfate IVPB 2 g PRN   magnesium sulfate IVPB 4 g PRN   ondansetron 4 mg Q12H PRN   potassium chloride 10% 40 mEq PRN   potassium chloride 10% 40 mEq PRN   potassium chloride 10% 40 mEq PRN   potassium, sodium phosphates 2 packet PRN   potassium, sodium phosphates 2 packet PRN   potassium, sodium phosphates 2 packet PRN      Today I independently reviewed pertinent medications, lines/drains/airways, imaging, cardiology, lab results,       Assessment/Plan:     Neuro   * Cerebral cavernous malformation    POD 1 s/p brainstem cavernoma resection   Subgaleal drain in place  Lumbar drain removed post op   Dexamethasone 4 mg q 6 h  Keppra 500 mg bid   -140        Cardiac/Vascular   Essential hypertension    -140  Nicardipine off  Valsartan 80 mg daily  Propranolol 80 mg daily         Hyperlipidemia    Atorvastatin 20 mg daily   Fenofibrate 134 mg daily               Prophylaxis:  Venous Thromboembolism: mechanical  Stress Ulcer: H2B  Ventilator Pneumonia: not applicable     Activity Orders          Activity as tolerated starting at 02/01 1424        No Order    Krystina Mayes PA-C  Neurocritical Care  [facility]

## 2018-02-01 NOTE — PLAN OF CARE
Problem: Physical Therapy Goal  Goal: Physical Therapy Goal  Goals to be met by: 2018     Patient will increase functional independence with mobility by performin. Supine to sit with Modified [place]  2. Sit to supine with Modified [place]  3. Sit to stand transfer with Supervision  4. Gait  x 200 feet with Supervision with or without appropriate AD.  5. Lower extremity exercise program x15 reps per handout, with independence    Outcome: Ongoing (interventions implemented as appropriate)  Pt evaluation complete.     KAMRON VILLANUEVA, PT  2018

## 2018-02-01 NOTE — PLAN OF CARE
Problem: Occupational Therapy Goal  Goal: Occupational Therapy Goal  Goals to be met by: 2/8/18     Patient will increase functional independence with ADLs by performing:    UE Dressing with Set-up Assistance.  LE Dressing with Set-up Assistance.  Grooming while standing at sink with Supervision.  Supine to sit with Modified [place] using bedrails.  Stand pivot transfers with Supervision.    Outcome: Ongoing (interventions implemented as appropriate)  OT goals set 2/1/18 to be met by 2/8/18.  KRYSTINA Hammond  2/1/2018

## 2018-02-01 NOTE — H&P
[facility]  Neurocritical Care  History & Physical    Admit Date: 1/31/2018  Service Date: 01/31/2018  Length of Stay: 0    Subjective:     Chief Complaint: Cerebral cavernous malformation    History of Present Illness: Joel Deluca is a 53 y.o. male with hx of HTN, HLD, cervical disc disease with myelopathy s/p ACDF, CN4 palsy who presents to Neuro Critical Care s/p Brainstem/Ismael Cavernous Malformation Resection. Per the chart, patient  has sensation of being wet on the Right side of body, loss of balance, slurring, difficulty directing left hand, and double vision. MRI shows 1.8cm lesion in L posterior brainstem/ismael with heterogenous signal suggesting a cavernous malformation.  Surgery recommending subtemporal approach for brainstem cavernoma resection with placement of lumbar drain which has been removed. Patient currently has a subgaleal drain. Patient will be admitted to Neuro Critical Care for a higher level of care.        Past Medical History:   Diagnosis Date    Cerebral cavernous malformation     Cervical disc disease with myelopathy     Hyperlipidemia     Hypertension      Past Surgical History:   Procedure Laterality Date    ANTERIOR CERVICAL DISCECTOMY W/ FUSION        No current facility-administered medications on file prior to encounter.      Current Outpatient Prescriptions on File Prior to Encounter   Medication Sig Dispense Refill    atorvastatin (LIPITOR) 20 MG tablet Take 1 tablet (20 mg total) by mouth once daily. (Patient taking differently: Take 20 mg by mouth every morning. ) 30 tablet 5    fenofibric acid (FIBRICOR) 135 mg CpDR Take 1 capsule (135 mg total) by mouth once daily. (Patient taking differently: Take 1 capsule by mouth every morning. ) 30 capsule 5    propranolol (INDERAL LA) 80 MG 24 hr capsule Take 1 capsule (80 mg total) by mouth once daily. (Patient taking differently: Take 80 mg by mouth every morning. ) 30 capsule 5    valsartan (DIOVAN) 80  MG tablet Take 1 tablet (80 mg total) by mouth once daily. (Patient taking differently: Take 80 mg by mouth every morning. ) 30 tablet 5      Allergies: Patient has no known allergies.    Family History   Problem Relation Age of Onset    Hypertension Mother     Heart disease Father     Hypertension Father      Social History   Substance Use Topics    Smoking status: Never Smoker    Smokeless tobacco: Never Used    Alcohol use Yes     Review of Systems Unable to assess due to level of consciousness post anesthesia.     Objective:     Vitals:    Temp: 99.1 °F (37.3 °C)  Pulse: 90  Rhythm: normal sinus rhythm  BP: 125/68  MAP (mmHg): 91  Resp: 18  SpO2: (!) 92 %  Oxygen Concentration (%): 55  O2 Device (Oxygen Therapy): venti mask    Temp  Min: 98.1 °F (36.7 °C)  Max: 99.1 °F (37.3 °C)  Pulse  Min: 73  Max: 96  BP  Min: 106/63  Max: 160/90  MAP (mmHg)  Min: 80  Max: 105  Resp  Min: 18  Max: 21  SpO2  Min: 92 %  Max: 100 %  Oxygen Concentration (%)  Min: 55  Max: 55    No intake/output data recorded.           Physical Exam   Physical Exam:  GA: Lethargic, Difficult to arouse, Post anethesia, Follows simple commands: ie wiggle toes, open eyes, comfortable, no acute distress.   HEENT: No scleral icterus or JVD.   Pulmonary: Clear to auscultation Anterior. No wheezing, crackles, or rhonchi.  Cardiac: RRR S1 & S2 w/o rubs/murmurs/gallops.   Abdominal: Bowel sounds present x 4. No appreciable hepatosplenomegaly.  Skin: No jaundice, rashes, or visible lesions.  Neuro:  --GCS: E4 V3 M5  --Mental Status: Lethargic, Difficult to arouse, Post anethesia, Follows simple commands: ie wiggle toes, open eyes,    --CN II-XII grossly intact.   --Pupils 4mm, PERRL.   --Corneal reflex, gag, cough intact.  --LUE strength: 3/5 minimal movement  --RUE strength: 4/5 Contracted  --LLE strength: 5/5 localizes to pain   --RLE strength: 5/5 localizes to pain   Unable to test gait due to level of consciousness.    Today I personally  reviewed pertinent medications, lines/drains/airways, lab results, notably:        Assessment/Plan:     Neuro   * Cerebral cavernous malformation    S/p Brain Stem Cavernous Malformation Removal   --Lumbar drain placement removed post op  -- MRI 1/30 reveals hemorrhagic lesion  left dorsal elvie extension in cistern with mass effect on the cerebral aqueduct and cranial extension into the left midbrain. Cavernoma 1.7  X  1.5 X  1.6cm  -- Continue Dexamethasone 4 mg q6  -- Continue Keppra 500 mg BID   -- SBP goal less than 140  -- Pending further recommendations per NGSY         Seizure prophylaxis    -- Continue Keppra 500 mg BID        Cardiac/Vascular   Essential hypertension    --Cardene gtt  -- SBP goal less than 140   --Continue Propanalol 80 mg daily   -- Continue Valsartan  80 mg daily         Hyperlipidemia    -- Continue Atorvastatin 20 mg daily             Prophylaxis:  Venous Thromboembolism: mechanical  Stress Ulcer: None  Ventilator Pneumonia: not applicable     Activity Orders          None        No Order    Soni Ross PA-C  Neurocritical Care  [facility]

## 2018-02-01 NOTE — PT/OT/SLP EVAL
Physical Therapy Evaluation    Patient Name:  oJel Deluca   MRN:  [id number]    Recommendations:     Discharge Recommendations:  outpatient PT   Discharge Equipment Recommendations: none   Barriers to discharge: None    Assessment:     Joel Deluca is a 53 y.o. male admitted with a medical diagnosis of Cerebral cavernous malformation.  He presents with the following impairments/functional limitations:  weakness, impaired balance, impaired endurance, impaired functional mobilty, gait instability, impaired cardiopulmonary response to activity. Pt performed bed mobility and transfers CGA. Pt took 5 steps to bedside chair CGA without AD, slightly unsteady; no LOB and mild SOB. Pt amb limited 2* dizziness. Pt will benefit from skilled PT to improve deficits and increase overall functional mobility.     Rehab Prognosis:  Good; patient would benefit from acute skilled PT services to address these deficits and reach maximum level of function.      Recent Surgery: Procedure(s) (LRB):  RESECTION-TUMOR, Craniotomy with Stealth Navigation for cavernous malformation  (Left) 1 Day Post-Op    Plan:     During this hospitalization, patient to be seen 4 x/week to address the above listed problems via gait training, therapeutic activities, neuromuscular re-education, therapeutic exercises  · Plan of Care Expires:  03/01/18   Plan of Care Reviewed with: patient, spouse    Subjective     Communicated with RN prior to session.  Patient found supine in bed and wife present upon PT entry to room, agreeable to evaluation.      Chief Complaint: HA  Patient comments/goals: return home  Pain/Comfort:  · Pain Rating 1: 2/10  · Location - Side 1: Bilateral  · Location - Orientation 1: generalized  · Location 1: head  · Pain Addressed 1: Distraction, Reposition  · Pain Rating Post-Intervention 1: 2/10    Living Environment:  Pt lives with wife and 12 y/o child in 2-story house with threshold SACHA, bed and bath on 1st floor, and a walk-in shower.  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 Pt wife reports she also works but is able to adjust her schedule to provide assist if needed.   Prior to admission, patients level of function was (I).  Patient has the following equipment: none.  DME owned (not currently used): none.  Upon discharge, patient will have assistance from family.    Objective:     Patient found with: hemovac, pulse ox (continuous), arterial line, telemetry, blood pressure cuff, reynoso catheter, oxygen (subgaleal drain)     General Precautions: Standard, fall, NPO, seizure   Orthopedic Precautions:N/A   Braces: N/A     Exams:  · Cognitive Exam:  Patient is oriented to Person, Place, Time and Situation and follows 100% of multi-step commands   · Gross Motor Coordination:  WFL  · Postural Exam:  Patient presented with the following abnormalities:    · -       No postural abnormalities identified  · Sensation:    · -       Intact  light/touch B LE  · Skin Integrity/Edema:      · -       Skin integrity: Visible skin intact  · RLE ROM: WFL  · RLE Strength: WFL  · LLE ROM: WFL  · LLE Strength: WFL    Functional Mobility:  Bed Mobility:     · Supine to Sit: contact guard assistance    Transfers:  Sit to Stand:  contact guard assistance with no AD    Gait: 5 steps to bedside chair CGA without AD, slightly unsteady; no LOB and mild SOB. Pt amb limited 2* dizziness.     AM-PAC 6 CLICK MOBILITY  Total Score:18       Therapeutic Activities and Exercises:  Pt and wife educated on:  -role of PT/POC  -safety with transfers and amb  -importance of OOB activity  -sitting up in chair for meals  Pt safe to perform transfers with RN staff.     Patient left up in chair with all lines intact, call button in reach, RN notified and wife present.    GOALS:    Physical Therapy Goals        Problem: Physical Therapy Goal    Goal Priority Disciplines Outcome Goal Variances Interventions   Physical Therapy Goal      PT/OT, PT Ongoing (interventions implemented as appropriate)     Description:  Goals to be met by: 2018     Patient will increase functional independence with mobility by performin. Supine to sit with Modified [place]  2. Sit to supine with Modified [place]  3. Sit to stand transfer with Supervision  4. Gait  x 200 feet with Supervision with or without appropriate AD.  5. Lower extremity exercise program x15 reps per handout, with independence                      History:     Past Medical History:   Diagnosis Date    Cerebral cavernous malformation     Cervical disc disease with myelopathy     Hyperlipidemia     Hypertension        Past Surgical History:   Procedure Laterality Date    ANTERIOR CERVICAL DISCECTOMY W/ FUSION         Clinical Decision Making:     History  Co-morbidities and personal factors that may impact the plan of care Examination  Body Structures and Functions, activity limitations and participation restrictions that may impact the plan of care Clinical Presentation   Decision Making/ Complexity Score   Co-morbidities:   [] Time since onset of injury / illness / exacerbation  [x] Status of current condition  []Patient's cognitive status and safety concerns    [x] Multiple Medical Problems (see med hx)  Personal Factors:   [] Patient's age  [x] Prior Level of function   [] Patient's home situation (environment and family support)  [] Patient's level of motivation  [] Expected progression of patient      HISTORY:(criteria)    [] 38010 - no personal factors/history    [] 43192 - has 1-2 personal factor/comorbidity     [x] 04938 - has >3 personal factor/comorbidity     Body Regions:  [x] Objective examination findings  [x] Head     []  Neck  [x] Trunk   [] Upper Extremity  [x] Lower Extremity    Body Systems:  [x] For communication ability, affect, cognition, language, and learning style: the assessment of the ability to make needs known, consciousness, orientation  (person, place, and time), expected emotional /behavioral responses, and learning preferences (eg, learning barriers, education  needs)  [x] For the neuromuscular system: a general assessment of gross coordinated movement (eg, balance, gait, locomotion, transfers, and transitions) and motor function  (motor control and motor learning)  [x] For the musculoskeletal system: the assessment of gross symmetry, gross range of motion, gross strength, height, and weight  [] For the integumentary system: the assessment of pliability(texture), presence of scar formation, skin color, and skin integrity  [x] For cardiovascular/pulmonary system: the assessment of heart rate, respiratory rate, blood pressure, and edema     Activity limitations:    [x] Patient's cognitive status and saf ety concerns          [x] Status of current condition      [] Weight bearing restriction  [] Cardiopulmunary Restriction    Participation Restrictions:   [] Goals and goal agreement with the patient     [] Rehab potential (prognosis) and probable outcome      Examination of Body System: (criteria)    [] 34117 - addressing 1-2 elements    [] 92827 - addressing a total of 3 or more elements     [x] 93028 -  Addressing a total of 4 or more elements         Clinical Presentation: (criteria)  Stable - 43860     On examination of body system using standardized tests and measures patient presents with 1-2 elements from any of the following: body structures and functions, activity limitations, and/or participation restrictions.  Leading to a clinical presentation that is considered stable and/or uncomplicated                              Clinical Decision Making  (Eval Complexity):  Low- 27990     Time Tracking:     PT Received On: 02/01/18  PT Start Time: 0945     PT Stop Time: 1006  PT Total Time (min): 21 min     Billable Minutes: Evaluation 21      KAMRON VILLANUEVA, PT  02/01/2018

## 2018-02-01 NOTE — SUBJECTIVE & OBJECTIVE
Past Medical History:   Diagnosis Date    Cerebral cavernous malformation     Cervical disc disease with myelopathy     Hyperlipidemia     Hypertension      Past Surgical History:   Procedure Laterality Date    ANTERIOR CERVICAL DISCECTOMY W/ FUSION        No current facility-administered medications on file prior to encounter.      Current Outpatient Prescriptions on File Prior to Encounter   Medication Sig Dispense Refill    atorvastatin (LIPITOR) 20 MG tablet Take 1 tablet (20 mg total) by mouth once daily. (Patient taking differently: Take 20 mg by mouth every morning. ) 30 tablet 5    fenofibric acid (FIBRICOR) 135 mg CpDR Take 1 capsule (135 mg total) by mouth once daily. (Patient taking differently: Take 1 capsule by mouth every morning. ) 30 capsule 5    propranolol (INDERAL LA) 80 MG 24 hr capsule Take 1 capsule (80 mg total) by mouth once daily. (Patient taking differently: Take 80 mg by mouth every morning. ) 30 capsule 5    valsartan (DIOVAN) 80 MG tablet Take 1 tablet (80 mg total) by mouth once daily. (Patient taking differently: Take 80 mg by mouth every morning. ) 30 tablet 5      Allergies: Patient has no known allergies.    Family History   Problem Relation Age of Onset    Hypertension Mother     Heart disease Father     Hypertension Father      Social History   Substance Use Topics    Smoking status: Never Smoker    Smokeless tobacco: Never Used    Alcohol use Yes     Review of Systems Unable to assess due to level of consciousness post anesthesia.     Objective:     Vitals:    Temp: 98 °F (36.7 °C)  Pulse: 77  Rhythm: normal sinus rhythm  BP: 127/66  MAP (mmHg): 90  Resp: 12  SpO2: 95 %  O2 Device (Oxygen Therapy): nasal cannula    Temp  Min: 98 °F (36.7 °C)  Max: 99 °F (37.2 °C)  Pulse  Min: 77  Max: 100  BP  Min: 115/60  Max: 135/71  MAP (mmHg)  Min: 80  Max: 96  Resp  Min: 10  Max: 28  SpO2  Min: 90 %  Max: 97 %    01/31 0701 - 02/01 0700  In: 5827.1 [I.V.:5777.1]  Out: 2793  [Urine:5050; Drains:90]           Physical Exam   Physical Exam:  GA: Lethargic, Difficult to arouse, Post anethesia, Follows simple commands: ie wiggle toes, open eyes, comfortable, no acute distress.   HEENT: No scleral icterus or JVD.   Pulmonary: Clear to auscultation Anterior. No wheezing, crackles, or rhonchi.  Cardiac: RRR S1 & S2 w/o rubs/murmurs/gallops.   Abdominal: Bowel sounds present x 4. No appreciable hepatosplenomegaly.  Skin: No jaundice, rashes, or visible lesions.  Neuro:  --GCS: E4 V3 M5  --Mental Status: Lethargic, Difficult to arouse, Post anethesia, Follows simple commands: ie wiggle toes, open eyes,    --CN II-XII grossly intact.   --Pupils 4mm, PERRL.   --Corneal reflex, gag, cough intact.  --LUE strength: 3/5 minimal movement  --RUE strength: 4/5 Contracted  --LLE strength: 5/5 localizes to pain   --RLE strength: 5/5 localizes to pain   Unable to test gait due to level of consciousness.    Today I personally reviewed pertinent medications, lines/drains/airways, lab results, notably:      Neurosurgery Physical Exam

## 2018-02-01 NOTE — PT/OT/SLP EVAL
Occupational Therapy   Evaluation    Name: Joel Deluca  MRN: [id number]  Admitting Diagnosis:  Cerebral cavernous malformation 1 Day Post-Op    Recommendations:     Discharge Recommendations: home  Discharge Equipment Recommendations:  none  Barriers to discharge:  None    History:     Occupational Profile:  Living Environment: pt lives with wife and 14yo child in 2SH (bed/bath first floor) with 0STE (threshold); walk-in shower  Previous level of function: independent with all ADLs/functional mobility  Roles and Routines: working as  at [employer]; enjoys fishing and cooking  Equipment Owned:  none  Assistance upon Discharge: pt's wife can provide assistance as needed    Past Medical History:   Diagnosis Date    Cerebral cavernous malformation     Cervical disc disease with myelopathy     Hyperlipidemia     Hypertension        Past Surgical History:   Procedure Laterality Date    ANTERIOR CERVICAL DISCECTOMY W/ FUSION         Subjective     Chief Complaint: Burning sensation in R arm; double vision; decreased coordination in LUE  Patient/Family stated goals: to return home and to work  Communicated with: RN prior to session.  Pain/Comfort:  · Pain Rating 1: 2/10  · Location 1: head  · Pain Rating 2:  (pt reported burning sensation)  · Location - Side 2: Right  · Location 2: arm    Patients cultural, spiritual, Muslim conflicts given the current situation: none    Objective:     Patient found with: hemovac (subgaleal drain), peripheral IV, pulse ox (continuous), arterial line, reynoso catheter, oxygen (4L)    General Precautions: Standard, fall, NPO, seizure   Orthopedic Precautions:N/A   Braces: N/A     Occupational Performance:    Bed Mobility:    · Patient completed Supine to Sit with contact guard assistance  · Patient completed Scooting to EOB with contact guard assistance    Functional Mobility/Transfers:  · Patient completed Sit <> Stand Transfer with contact guard assistance  with  no  assistive device   · Patient completed Bed <> Chair Transfer using Stand Pivot technique with contact guard assistance with no assistive device    Activities of Daily Living:  · LB Dressing: setup assistance to don socks using opposite heel to knee technique    Cognitive/Visual Perceptual:  Cognitive/Psychosocial Skills:     -       Oriented to: Person, Place, Time and Situation   -       Follows Commands/attention:Follows one-step commands and Follows two-step commands  -       Communication: mildly slurred speech  -       Memory: No Deficits noted  -       Safety awareness/insight to disability: impaired   -       Mood/Affect/Coping skills/emotional control: Appropriate to situation, Cooperative and Pleasant  Visual/Perceptual:      -Impaired  - pt experiencing double vision (present at baseline)    Physical Exam:  Balance:    -       pt is slightly unsteady on feet during t/f requiring CGA for safety for taking steps to chair  Postural examination/scapula alignment:    -       No postural abnormalities identified  Skin integrity: Visible skin intact  Edema:  Moderate edema in RUE  Sensation:    -       Impaired  - pt experiencing burning sensation in RUE  Upper Extremity Range of Motion:     -       Right Upper Extremity: WNL  -       Left Upper Extremity: WNL  Upper Extremity Strength:    -       Right Upper Extremity: WNL  -       Left Upper Extremity: WNL   Strength:    -       Right Upper Extremity: WNL  -       Left Upper Extremity: WNL  Fine Motor Coordination:    -       Intact  Gross motor coordination:   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 male with a medical diagnosis of Cerebral cavernous malformation.  He presents with decreased dynamic balance when  "performing functional mobility. Pt's occupational performance is also affected by LUE ataxia and double vision, which were present at baseline. Performance deficits affecting function are weakness, impaired sensation, impaired self care skills, visual deficits, impaired balance, gait instability, impaired functional mobilty, decreased coordination, pain.      Rehab Prognosis:  good; patient would benefit from acute skilled OT services to address these deficits and reach maximum level of function.         Clinical Decision Makin.  OT Low:  "Pt evaluation falls under low complexity for evaluation coding due to performance deficits noted in 1-3 areas as stated above and 0 co-morbities affecting current functional status. Data obtained from problem focused assessments. No modifications or assistance was required for completion of evaluation. Only brief occupational profile and history review completed."     Plan:     Patient to be seen 3 x/week to address the above listed problems via self-care/home management, community/work re-entry, therapeutic activities, therapeutic exercises, neuromuscular re-education  · Plan of Care Expires: 18  · Plan of Care Reviewed with: patient, spouse    This Plan of care has been discussed with the patient who was involved in its development and understands and is in agreement with the identified goals and treatment plan    GOALS:    Occupational Therapy Goals        Problem: Occupational Therapy Goal    Goal Priority Disciplines Outcome Interventions   Occupational Therapy Goal     OT, PT/OT Ongoing (interventions implemented as appropriate)    Description:  Goals to be met by: 18     Patient will increase functional independence with ADLs by performing:    UE Dressing with Set-up Assistance.  LE Dressing with Set-up Assistance.  Grooming while standing at sink with Supervision.  Supine to sit with Modified [place] using bedrails.  Toilet t/f to toilet with " supervision without verbal cues for safety.  Toileting from toilet with supervision.                      Time Tracking:     OT Date of Treatment: 02/01/18  OT Start Time: 0945  OT Stop Time: 1006  OT Total Time (min): 21 min  Co-eval with PT  Billable Minutes:Evaluation 21    KRYSTINA Hammond  2/1/2018

## 2018-02-01 NOTE — PROGRESS NOTES
[facility]  Neurosurgery  Progress Note    Subjective:     History of Present Illness: No notes on file    Post-Op Info:  Procedure(s) (LRB):  RESECTION-TUMOR, Craniotomy with Stealth Navigation for cavernous malformation  (Left)  CRANIOTOMY WITH STEALTH (Left)   1 Day Post-Op     Past Medical History:   Diagnosis Date    Cerebral cavernous malformation     Cervical disc disease with myelopathy     Hyperlipidemia     Hypertension      Past Surgical History:   Procedure Laterality Date    ANTERIOR CERVICAL DISCECTOMY W/ FUSION        No current facility-administered medications on file prior to encounter.      Current Outpatient Prescriptions on File Prior to Encounter   Medication Sig Dispense Refill    atorvastatin (LIPITOR) 20 MG tablet Take 1 tablet (20 mg total) by mouth once daily. (Patient taking differently: Take 20 mg by mouth every morning. ) 30 tablet 5    fenofibric acid (FIBRICOR) 135 mg CpDR Take 1 capsule (135 mg total) by mouth once daily. (Patient taking differently: Take 1 capsule by mouth every morning. ) 30 capsule 5    propranolol (INDERAL LA) 80 MG 24 hr capsule Take 1 capsule (80 mg total) by mouth once daily. (Patient taking differently: Take 80 mg by mouth every morning. ) 30 capsule 5    valsartan (DIOVAN) 80 MG tablet Take 1 tablet (80 mg total) by mouth once daily. (Patient taking differently: Take 80 mg by mouth every morning. ) 30 tablet 5      Allergies: Patient has no known allergies.    Family History   Problem Relation Age of Onset    Hypertension Mother     Heart disease Father     Hypertension Father      Social History   Substance Use Topics    Smoking status: Never Smoker    Smokeless tobacco: Never Used    Alcohol use Yes     Review of Systems Unable to assess due to level of consciousness post anesthesia.     Objective:     Vitals:    Temp: 98 °F (36.7 °C)  Pulse: 77  Rhythm: normal sinus rhythm  BP: 127/66  MAP (mmHg): 90  Resp: 12  SpO2: 95  %  O2 Device (Oxygen Therapy): nasal cannula    Temp  Min: 98 °F (36.7 °C)  Max: 99 °F (37.2 °C)  Pulse  Min: 77  Max: 100  BP  Min: 115/60  Max: 135/71  MAP (mmHg)  Min: 80  Max: 96  Resp  Min: 10  Max: 28  SpO2  Min: 90 %  Max: 97 %    01/31 0701 - 02/01 0700  In: 5827.1 [I.V.:5777.1]  Out: 5140 [Urine:5050; Drains:90]           Physical Exam   Physical Exam:  GA: Lethargic, Difficult to arouse, Post anethesia, Follows simple commands: ie wiggle toes, open eyes, comfortable, no acute distress.   HEENT: No scleral icterus or JVD.   Pulmonary: Clear to auscultation Anterior. No wheezing, crackles, or rhonchi.  Cardiac: RRR S1 & S2 w/o rubs/murmurs/gallops.   Abdominal: Bowel sounds present x 4. No appreciable hepatosplenomegaly.  Skin: No jaundice, rashes, or visible lesions.  Neuro:  --GCS: E4 V3 M5  --Mental Status: Lethargic, Difficult to arouse, Post anethesia, Follows simple commands: ie wiggle toes, open eyes,    --CN II-XII grossly intact.   --Pupils 4mm, PERRL.   --Corneal reflex, gag, cough intact.  --LUE strength: 3/5 minimal movement  --RUE strength: 4/5 Contracted  --LLE strength: 5/5 localizes to pain   --RLE strength: 5/5 localizes to pain   Unable to test gait due to level of consciousness.    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    --Continue care per primary team.  --Encourage ambulation with PTOT.  --Advance diet as tolerated.  --We will continue to monitor closely, please contact us with any questions or concerns.             Dominic Shields MD  Neurosurgery  [facility]

## 2018-02-02 LAB
ALBUMIN SERPL BCP-MCNC: 3 G/DL
ALP SERPL-CCNC: 52 U/L
ALT SERPL W/O P-5'-P-CCNC: 20 U/L
ANION GAP SERPL CALC-SCNC: 11 MMOL/L
ANION GAP SERPL CALC-SCNC: 8 MMOL/L
AST SERPL-CCNC: 30 U/L
BASOPHILS # BLD AUTO: 0.02 K/UL
BASOPHILS NFR BLD: 0.1 %
BILIRUB SERPL-MCNC: 0.1 MG/DL
BUN SERPL-MCNC: 16 MG/DL
BUN SERPL-MCNC: 17 MG/DL
CALCIUM SERPL-MCNC: 8.5 MG/DL
CALCIUM SERPL-MCNC: 9.2 MG/DL
CHLORIDE SERPL-SCNC: 107 MMOL/L
CHLORIDE SERPL-SCNC: 110 MMOL/L
CO2 SERPL-SCNC: 23 MMOL/L
CO2 SERPL-SCNC: 24 MMOL/L
CREAT SERPL-MCNC: 0.8 MG/DL
CREAT SERPL-MCNC: 0.9 MG/DL
DIFFERENTIAL METHOD: ABNORMAL
EOSINOPHIL # BLD AUTO: 0 K/UL
EOSINOPHIL NFR BLD: 0 %
ERYTHROCYTE [DISTWIDTH] IN BLOOD BY AUTOMATED COUNT: 14 %
EST. GFR  (AFRICAN AMERICAN): >60 ML/MIN/1.73 M^2
EST. GFR  (AFRICAN AMERICAN): >60 ML/MIN/1.73 M^2
EST. GFR  (NON AFRICAN AMERICAN): >60 ML/MIN/1.73 M^2
EST. GFR  (NON AFRICAN AMERICAN): >60 ML/MIN/1.73 M^2
GLUCOSE SERPL-MCNC: 150 MG/DL
GLUCOSE SERPL-MCNC: 164 MG/DL
HCT VFR BLD AUTO: 30 %
HGB BLD-MCNC: 10.1 G/DL
IMM GRANULOCYTES # BLD AUTO: 0.13 K/UL
IMM GRANULOCYTES NFR BLD AUTO: 0.7 %
LYMPHOCYTES # BLD AUTO: 1.1 K/UL
LYMPHOCYTES NFR BLD: 5.5 %
MAGNESIUM SERPL-MCNC: 2.4 MG/DL
MCH RBC QN AUTO: 30.1 PG
MCHC RBC AUTO-ENTMCNC: 33.7 G/DL
MCV RBC AUTO: 89 FL
MONOCYTES # BLD AUTO: 0.9 K/UL
MONOCYTES NFR BLD: 4.7 %
NEUTROPHILS # BLD AUTO: 16.9 K/UL
NEUTROPHILS NFR BLD: 89 %
NRBC BLD-RTO: 0 /100 WBC
PHOSPHATE SERPL-MCNC: 2.5 MG/DL
PLATELET # BLD AUTO: 201 K/UL
PLATELET BLD QL SMEAR: ABNORMAL
PMV BLD AUTO: 9.2 FL
POCT GLUCOSE: 189 MG/DL (ref 70–110)
POTASSIUM SERPL-SCNC: 4.1 MMOL/L
POTASSIUM SERPL-SCNC: 6.1 MMOL/L
PROT SERPL-MCNC: 6.8 G/DL
RBC # BLD AUTO: 3.36 M/UL
SODIUM SERPL-SCNC: 141 MMOL/L
SODIUM SERPL-SCNC: 142 MMOL/L
WBC # BLD AUTO: 19.04 K/UL

## 2018-02-02 PROCEDURE — 25000003 PHARM REV CODE 250: Performed by: NURSE PRACTITIONER

## 2018-02-02 PROCEDURE — 63600175 PHARM REV CODE 636 W HCPCS: Performed by: NEUROLOGICAL SURGERY

## 2018-02-02 PROCEDURE — 25000003 PHARM REV CODE 250: Performed by: PHYSICIAN ASSISTANT

## 2018-02-02 PROCEDURE — 25000003 PHARM REV CODE 250: Performed by: NEUROLOGICAL SURGERY

## 2018-02-02 PROCEDURE — 63600175 PHARM REV CODE 636 W HCPCS: Performed by: PHYSICIAN ASSISTANT

## 2018-02-02 PROCEDURE — 83735 ASSAY OF MAGNESIUM: CPT

## 2018-02-02 PROCEDURE — 85025 COMPLETE CBC W/AUTO DIFF WBC: CPT

## 2018-02-02 PROCEDURE — 97530 THERAPEUTIC ACTIVITIES: CPT

## 2018-02-02 PROCEDURE — 84100 ASSAY OF PHOSPHORUS: CPT

## 2018-02-02 PROCEDURE — 99233 SBSQ HOSP IP/OBS HIGH 50: CPT | Mod: ,,, | Performed by: NURSE PRACTITIONER

## 2018-02-02 PROCEDURE — 80048 BASIC METABOLIC PNL TOTAL CA: CPT

## 2018-02-02 PROCEDURE — 80053 COMPREHEN METABOLIC PANEL: CPT

## 2018-02-02 PROCEDURE — 20600001 HC STEP DOWN PRIVATE ROOM

## 2018-02-02 RX ORDER — BACITRACIN 500 [USP'U]/G
OINTMENT TOPICAL 2 TIMES DAILY
Status: DISCONTINUED | OUTPATIENT
Start: 2018-02-02 | End: 2018-02-03 | Stop reason: HOSPADM

## 2018-02-02 RX ORDER — GLUCAGON 1 MG
1 KIT INJECTION
Status: DISCONTINUED | OUTPATIENT
Start: 2018-02-02 | End: 2018-02-03 | Stop reason: HOSPADM

## 2018-02-02 RX ORDER — INSULIN ASPART 100 [IU]/ML
0-5 INJECTION, SOLUTION INTRAVENOUS; SUBCUTANEOUS
Status: DISCONTINUED | OUTPATIENT
Start: 2018-02-02 | End: 2018-02-03 | Stop reason: HOSPADM

## 2018-02-02 RX ORDER — IBUPROFEN 200 MG
16 TABLET ORAL
Status: DISCONTINUED | OUTPATIENT
Start: 2018-02-02 | End: 2018-02-03 | Stop reason: HOSPADM

## 2018-02-02 RX ORDER — IBUPROFEN 200 MG
24 TABLET ORAL
Status: DISCONTINUED | OUTPATIENT
Start: 2018-02-02 | End: 2018-02-03 | Stop reason: HOSPADM

## 2018-02-02 RX ADMIN — FAMOTIDINE 20 MG: 20 TABLET, FILM COATED ORAL at 09:02

## 2018-02-02 RX ADMIN — DEXAMETHASONE SODIUM PHOSPHATE 4 MG: 4 INJECTION, SOLUTION INTRAMUSCULAR; INTRAVENOUS at 12:02

## 2018-02-02 RX ADMIN — ACETAMINOPHEN 650 MG: 325 TABLET, FILM COATED ORAL at 06:02

## 2018-02-02 RX ADMIN — STANDARDIZED SENNA CONCENTRATE AND DOCUSATE SODIUM 1 TABLET: 8.6; 5 TABLET, FILM COATED ORAL at 09:02

## 2018-02-02 RX ADMIN — MUPIROCIN 1 G: 20 OINTMENT TOPICAL at 08:02

## 2018-02-02 RX ADMIN — VALSARTAN 80 MG: 80 TABLET ORAL at 06:02

## 2018-02-02 RX ADMIN — HEPARIN SODIUM 5000 UNITS: 5000 INJECTION, SOLUTION INTRAVENOUS; SUBCUTANEOUS at 06:02

## 2018-02-02 RX ADMIN — BACITRACIN: 500 OINTMENT TOPICAL at 09:02

## 2018-02-02 RX ADMIN — HEPARIN SODIUM 5000 UNITS: 5000 INJECTION, SOLUTION INTRAVENOUS; SUBCUTANEOUS at 01:02

## 2018-02-02 RX ADMIN — LEVETIRACETAM 500 MG: 500 TABLET ORAL at 08:02

## 2018-02-02 RX ADMIN — HEPARIN SODIUM 5000 UNITS: 5000 INJECTION, SOLUTION INTRAVENOUS; SUBCUTANEOUS at 09:02

## 2018-02-02 RX ADMIN — DEXAMETHASONE SODIUM PHOSPHATE 4 MG: 4 INJECTION, SOLUTION INTRAMUSCULAR; INTRAVENOUS at 06:02

## 2018-02-02 RX ADMIN — POTASSIUM & SODIUM PHOSPHATES POWDER PACK 280-160-250 MG 2 PACKET: 280-160-250 PACK at 10:02

## 2018-02-02 RX ADMIN — LEVETIRACETAM 500 MG: 500 TABLET ORAL at 09:02

## 2018-02-02 RX ADMIN — FENOFIBRATE 134 MG: 134 CAPSULE ORAL at 08:02

## 2018-02-02 RX ADMIN — MUPIROCIN 1 G: 20 OINTMENT TOPICAL at 09:02

## 2018-02-02 RX ADMIN — ATORVASTATIN CALCIUM 20 MG: 20 TABLET, FILM COATED ORAL at 06:02

## 2018-02-02 RX ADMIN — PROPRANOLOL HYDROCHLORIDE 80 MG: 80 CAPSULE, EXTENDED RELEASE ORAL at 06:02

## 2018-02-02 RX ADMIN — DEXAMETHASONE SODIUM PHOSPHATE 4 MG: 4 INJECTION, SOLUTION INTRAMUSCULAR; INTRAVENOUS at 11:02

## 2018-02-02 RX ADMIN — FAMOTIDINE 20 MG: 20 TABLET, FILM COATED ORAL at 08:02

## 2018-02-02 RX ADMIN — POTASSIUM & SODIUM PHOSPHATES POWDER PACK 280-160-250 MG 2 PACKET: 280-160-250 PACK at 06:02

## 2018-02-02 RX ADMIN — STANDARDIZED SENNA CONCENTRATE AND DOCUSATE SODIUM 1 TABLET: 8.6; 5 TABLET, FILM COATED ORAL at 08:02

## 2018-02-02 NOTE — PLAN OF CARE
Problem: Physical Therapy Goal  Goal: Physical Therapy Goal  Goals to be met by: 2018     Patient will increase functional independence with mobility by performin. Supine to sit with Modified [place]  2. Sit to supine with Modified [place]  3. Sit to stand transfer with Supervision  4. Gait  x 200 feet with Supervision with or without appropriate AD.  5. Lower extremity exercise program x15 reps per handout, with independence       Discharge Recommendations: OP PT    Pt safe to transfer OOB and amb in hallway with RN/PCT: Use no AD with SBA of 1 person.    Goals remain appropriate.     Aminah Lyle, PTA.   [phone]   2018

## 2018-02-02 NOTE — PT/OT/SLP PROGRESS
Occupational Therapy   Treatment/Discharge    Name: Joel Deluca  MRN: [id number]  Admitting Diagnosis:  Cerebral cavernous malformation  2 Days Post-Op    Recommendations:     Discharge Recommendations: outpatient OT  Discharge Equipment Recommendations:  none  Barriers to discharge:  None    Subjective     Communicated with: RN prior to session.  Pain/Comfort:  · Pain Rating 1: 0/10    Patients cultural, spiritual, Restoration conflicts given the current situation: none    Objective:     Patient found with: blood pressure cuff, peripheral IV, pulse ox (continuous), telemetry    General Precautions: Standard, fall, seizure   Orthopedic Precautions:N/A   Braces: N/A     Occupational Performance:    Bed Mobility:    · Patient completed Rolling/Turning to Right with modified independence  · Patient completed Supine to Sit with modified independence     Functional Mobility/Transfers:  · Patient completed Sit <> Stand Transfer with supervision  with  no assistive device   · Patient completed Bed <> Chair Transfer using Stand Pivot technique with supervision with no assistive device  · Patient completed Toilet Transfer Stand Pivot technique with supervision with  no AD  · Patient completed  Shower Transfer Stand Pivot technique with contact guard assistance with grab bars  · Functional Mobility: pt performed functional mobility from bed to bathroom and to chair with supervision and no AD; no LOB    Activities of Daily Living:  · Grooming: supervision to brush teeth at sink  · UB Dressing: set-up simulated 2/2 lines  · Toileting: supervision simulated from toilet    Patient left up in chair with all lines intact, call button in reach and friend present    AMPA 6 Click:  AMPA Total Score: 23  Education:    Assessment:     Joel Deluca is a 53 y.o. male with a medical diagnosis of Cerebral cavernous malformation.  He presents with improved safety with functional mobility/transfers and improved independence with ADLs. Pt  requires supervision at this time to maintain safety. Double vision remains a limiting factor as well as mild LUE ataxia. Pt would benefit from OP OT to address remaining deficits.      Rehab Prognosis:  good; patient would benefit from OP skilled OT services to address these deficits and reach maximum level of function.       Plan:     Patient to be seen 3 x/week to address the above listed problems via self-care/home management, community/work re-entry, therapeutic activities, therapeutic exercises, neuromuscular re-education  · Plan of Care Expires: 03/03/18  · Plan of Care Reviewed with: patient    This Plan of care has been discussed with the patient who was involved in its development and understands and is in agreement with the identified goals and treatment plan    GOALS:    Occupational Therapy Goals     Not on file          Multidisciplinary Problems (Resolved)        Problem: Occupational Therapy Goal    Goal Priority Disciplines Outcome Interventions   Occupational Therapy Goal   (Resolved)     OT, PT/OT Outcome(s) achieved    Description:  Goals to be met by: 2/8/18     Patient will increase functional independence with ADLs by performing:    UE Dressing with Set-up Assistance. MET; simulated  LE Dressing with Set-up Assistance. MET; socks  Grooming while standing at sink with Supervision. MET  Supine to sit with Modified [place] using bedrails. MET  Toilet t/f to toilet with supervision without verbal cues for safety. MET  Toileting from toilet with supervision. MET; Simulated with supervision                        Time Tracking:     OT Date of Treatment: 02/02/18  OT Start Time: 1458  OT Stop Time: 1510  OT Total Time (min): 12 min    Billable Minutes:Self Care/Home Management 12    KRYSTINA Hammond  2/2/2018

## 2018-02-02 NOTE — ASSESSMENT & PLAN NOTE
52 yo male with pmh of mesencephalic cavernous vascular malformation now s/p craniotomy for subtemporal transtentorial resection of mass (1/31).    --Continue care per primary team.  --Encourage ambulation with PTOT.  --Advance diet as tolerated.  --Cleared from neurosurgical perspective for stepdown to neurosurgerical stepdown unit under neurosurgery service.  --We will continue to monitor closely, please contact us with any questions or concerns.

## 2018-02-02 NOTE — ASSESSMENT & PLAN NOTE
POD 1 s/p brainstem cavernoma resection   Subgaleal drain removed today   Lumbar drain removed post op   Dexamethasone 4 mg q 6 h  Keppra 500 mg bid   -140  Repeat CTH :  Postoperative changes of recent left midbrain cavernous malformation resection without apparent complication  Discussed with NSGY ok to transfer under NSGY service

## 2018-02-02 NOTE — PLAN OF CARE
Problem: Occupational Therapy Goal  Goal: Occupational Therapy Goal  Goals to be met by: 2/8/18     Patient will increase functional independence with ADLs by performing:    UE Dressing with Set-up Assistance. MET; simulated  LE Dressing with Set-up Assistance. MET; socks  Grooming while standing at sink with Supervision. MET  Supine to sit with Modified [place] using bedrails. MET  Toilet t/f to toilet with supervision without verbal cues for safety. MET  Toileting from toilet with supervision. MET; Simulated with supervision      Outcome: Outcome(s) achieved Date Met: 02/02/18  Pt has met acute OT goals. Pt to be d/c from acute OT services. Recommending OP OT to address LUE coordination deficits.  KRYSTINA Hammond  2/2/2018

## 2018-02-02 NOTE — NURSING TRANSFER
Nursing Transfer Note      2/2/2018     Report given to Nurse Hernandez    Transfer To: [id number]    Transfer via wheelchair    Transported by RN    Medicines sent: Mupirucin ointment    Chart send with patient: Yes    Notified: spouse    Patient reassessed at: 4:00 PM 02/02/2018      Upon arrival to floor: patient oriented to room, call bell in reach and bed in lowest position

## 2018-02-02 NOTE — PROGRESS NOTES
[facility]  Neurosurgery  Progress Note    Subjective:     History of Present Illness: No notes on file    Post-Op Info:  Procedure(s) (LRB):  RESECTION-TUMOR, Craniotomy with Stealth Navigation for cavernous malformation  (Left)  CRANIOTOMY WITH STEALTH (Left)   2 Days Post-Op     Past Medical History:   Diagnosis Date    Cerebral cavernous malformation     Cervical disc disease with myelopathy     Hyperlipidemia     Hypertension      Past Surgical History:   Procedure Laterality Date    ANTERIOR CERVICAL DISCECTOMY W/ FUSION        No current facility-administered medications on file prior to encounter.      Current Outpatient Prescriptions on File Prior to Encounter   Medication Sig Dispense Refill    atorvastatin (LIPITOR) 20 MG tablet Take 1 tablet (20 mg total) by mouth once daily. (Patient taking differently: Take 20 mg by mouth every morning. ) 30 tablet 5    fenofibric acid (FIBRICOR) 135 mg CpDR Take 1 capsule (135 mg total) by mouth once daily. (Patient taking differently: Take 1 capsule by mouth every morning. ) 30 capsule 5    propranolol (INDERAL LA) 80 MG 24 hr capsule Take 1 capsule (80 mg total) by mouth once daily. (Patient taking differently: Take 80 mg by mouth every morning. ) 30 capsule 5    valsartan (DIOVAN) 80 MG tablet Take 1 tablet (80 mg total) by mouth once daily. (Patient taking differently: Take 80 mg by mouth every morning. ) 30 tablet 5      Allergies: Patient has no known allergies.    Family History   Problem Relation Age of Onset    Hypertension Mother     Heart disease Father     Hypertension Father      Social History   Substance Use Topics    Smoking status: Never Smoker    Smokeless tobacco: Never Used    Alcohol use Yes     Review of Systems Unable to assess due to level of consciousness post anesthesia.     Objective:     Vitals:    Temp: 99.5 °F (37.5 °C)  Pulse: 93  Rhythm: normal sinus rhythm  BP: (!) 121/90  MAP (mmHg): 97  Resp:  19  SpO2: 97 %  O2 Device (Oxygen Therapy): room air    Temp  Min: 98 °F (36.7 °C)  Max: 99.5 °F (37.5 °C)  Pulse  Min: 59  Max: 98  BP  Min: 107/55  Max: 136/71  MAP (mmHg)  Min: 72  Max: 97  Resp  Min: 10  Max: 24  SpO2  Min: 94 %  Max: 98 %    02/01 0701 - 02/02 0700  In: 1505 [P.O.:180; I.V.:725]  Out: 2180 [Urine:2145; Drains:35]   Unmeasured Output  Urine Occurrence: 1  Stool Occurrence: 1       Physical Exam   Physical Exam:  GA: Lethargic, Difficult to arouse, Post anethesia, Follows simple commands: ie wiggle toes, open eyes, comfortable, no acute distress.   HEENT: No scleral icterus or JVD.   Pulmonary: Clear to auscultation Anterior. No wheezing, crackles, or rhonchi.  Cardiac: RRR S1 & S2 w/o rubs/murmurs/gallops.   Abdominal: Bowel sounds present x 4. No appreciable hepatosplenomegaly.  Skin: No jaundice, rashes, or visible lesions.  Neuro:  --GCS: E4 V3 M5  --Mental Status: Lethargic, Difficult to arouse, Post anethesia, Follows simple commands: ie wiggle toes, open eyes,    --CN II-XII grossly intact.   --Pupils 4mm, PERRL.   --Corneal reflex, gag, cough intact.  --LUE strength: 3/5 minimal movement  --RUE strength: 4/5 Contracted  --LLE strength: 5/5 localizes to pain   --RLE strength: 5/5 localizes to pain   Unable to test gait due to level of consciousness.    Today I personally reviewed pertinent medications, lines/drains/airways, lab results, notably:      Neurosurgery Physical Exam      Assessment/Plan:     * Cerebral cavernous malformation    54 yo male with pmh of mesencephalic cavernous vascular malformation now s/p craniotomy for subtemporal transtentorial resection of mass (1/31).    --Continue care per primary team.  --Encourage ambulation with PTOT.  --Advance diet as tolerated.  --Cleared from neurosurgical perspective for stepdown to neurosurgerical stepdown unit under neurosurgery service.  --We will continue to monitor closely, please contact us with any questions or concerns.              Dominic Shields MD  Neurosurgery  [facility]

## 2018-02-02 NOTE — PROGRESS NOTES
[facility]  Neurocritical Care  Progress Note    Admit Date: 1/31/2018  Service Date: 02/02/2018  Length of Stay: 2    Subjective:     Chief Complaint: Cerebral cavernous malformation    History of Present Illness: Joel Deluca is a 53 year old male with hx of HTN, HLD, cervical disc disease with myelopathy s/p ACDF, CN4 palsy who presents to [facility] s/p resection of brainstem cavernoma. Per the chart, patient  has had sensation of being wet on the R side of body, loss of balance, slurring, difficulty directing left hand, and double vision. Lumbar drain removed rae op.  Subgaleal drain in place.     Hospital Course: 1/31 s/p resection of brainstem cavernoma   2/2: repeat CTH: Postoperative changes of recent left midbrain cavernous malformation resection without apparent complication  Ok to [place] [facility]     Interval History:  No significant events over night     Review of Systems    Review of symptoms  Constitutional: Denies fevers or chills.  Pulmonary: Denies shortness of breath or cough.  Cardiology: Denies chest pain or palpitations.  GI: Denies abdominal pain or constipation.  Neurologic: Denies new weakness,  headache, or paresthesias. + Double vision   Objective:     Vitals:  Temp: 99.3 °F (37.4 °C)  Pulse: 82  Rhythm: normal sinus rhythm  BP: 123/76  MAP (mmHg): 91  Resp: 16  SpO2: 96 %  O2 Device (Oxygen Therapy): room air    Temp  Min: 98 °F (36.7 °C)  Max: 99.3 °F (37.4 °C)  Pulse  Min: 59  Max: 98  BP  Min: 107/55  Max: 135/64  MAP (mmHg)  Min: 72  Max: 94  Resp  Min: 10  Max: 19  SpO2  Min: 94 %  Max: 98 %    02/01 0701 - 02/02 0700  In: 1505 [P.O.:180; I.V.:725]  Out: 2180 [Urine:2145; Drains:35]   Unmeasured Output  Urine Occurrence: 1  Stool Occurrence: 1       Physical Exam      Physical Exam:  GA: Alert, comfortable, no acute distress, up in chair at bedside   HEENT: No scleral icterus or JVD.   Pulmonary: Clear to auscultation A/L.   Cardiac: RRR S1 & S2 w/o  rubs/murmurs/gallops.   Abdominal: Bowel sounds present x 4.   Skin: No jaundice, rashes, or visible lesions.  Neuro:  --GCS: E4 V5 M6   --Mental Status:  Awake alert oriented follows all commands   --Double vision .   --Pupils 3mm, PERRL.   --Corneal reflex, gag, cough intact.  --LUE strength: 5/5  --RUE strength: 5/5  --LLE strength: 5/5  --RLE strength: 5/5    Unable to test gait due to level of consciousness.    Medications:  Continuous Scheduled  atorvastatin 20 mg QAM   bacitracin  BID   dexamethasone 4 mg Q6H   famotidine 20 mg BID   fenofibrate micronized 134 mg Daily with breakfast   heparin (porcine) 5,000 Units Q8H   levETIRAcetam 500 mg Q12H   mupirocin 1 g BID   propranolol 80 mg QAM   senna-docusate 8.6-50 mg 1 tablet BID   valsartan 80 mg QAM   PRN  acetaminophen 650 mg Q6H PRN   acetaminophen 650 mg Q6H PRN   acetaminophen 650 mg Q4H PRN   dextrose 50% 12.5 g PRN   dextrose 50% 25 g PRN   glucagon (human recombinant) 1 mg PRN   glucose 16 g PRN   glucose 24 g PRN   hydrALAZINE 10 mg Q4H PRN   insulin aspart 0-5 Units QID (AC + HS) PRN   labetalol 10 mg Q4H PRN   magnesium sulfate IVPB 2 g PRN   magnesium sulfate IVPB 4 g PRN   ondansetron 4 mg Q12H PRN   potassium chloride 10% 40 mEq PRN   potassium chloride 10% 40 mEq PRN   potassium chloride 10% 40 mEq PRN   potassium, sodium phosphates 2 packet PRN   potassium, sodium phosphates 2 packet PRN   potassium, sodium phosphates 2 packet PRN     Today I personally reviewed pertinent medications, lines/drains/airways, imaging, lab results,     Diet  Diet Adult Regular  Diet Adult Regular        Assessment/Plan:     Neuro   * Cerebral cavernous malformation    POD 2 s/p brainstem cavernoma resection   Subgaleal drain removed today   Lumbar drain removed post op   Dexamethasone 4 mg q 6 h  Added SSI with accu checks while on Dex  Keppra 500 mg bid   -140  Repeat CTH :  Postoperative changes of recent left midbrain cavernous malformation resection  without apparent complication  Discussed with NSGY ok to transfer under NSGY service         Seizure prophylaxis    -- Continue Keppra 500 mg BID        Cardiac/Vascular   Essential hypertension    -140  Nicardipine dc'd   Valsartan 80 mg daily  Propranolol 80 mg daily         Hyperlipidemia    Atorvastatin 20 mg daily   Fenofibrate 134 mg daily             Prophylaxis:  Venous Thromboembolism: mechanical chemical  Stress Ulcer: H2B  Ventilator Pneumonia: not applicable     Activity Orders          Activity as tolerated starting at 02/01 1424        No Order    Demarcus Chin NP  Neurocritical Care  [facility]

## 2018-02-02 NOTE — PLAN OF CARE
Problem: Patient Care Overview  Goal: Plan of Care Review  Outcome: Ongoing (interventions implemented as appropriate)  VS and assessment per flow sheet, pt up moving around today, sitting up in chair during the day, regular diet tolerated, reynoso and arterial line removed, replacing electrolytes, plan to step down tomorrow, patient progressing towards goal as tolerated. Plan of care reviewed with Joel Deluca and family at 1800, all concerns addressed. Will continue to monitor.

## 2018-02-02 NOTE — PLAN OF CARE
Problem: Patient Care Overview  Goal: Plan of Care Review  Outcome: Ongoing (interventions implemented as appropriate)  POC reviewed with pt and family at 1200. Pt verbalized understanding. Questions and concerns addressed. No acute events today. Pt progressing toward goals.Subgaleal drain was removed early this morning, repeat CT of head done. Patient with Transfer order to Regular room. Will continue to monitor. See flowsheets for full assessment and VS info.

## 2018-02-02 NOTE — SUBJECTIVE & OBJECTIVE
Past Medical History:   Diagnosis Date    Cerebral cavernous malformation     Cervical disc disease with myelopathy     Hyperlipidemia     Hypertension      Past Surgical History:   Procedure Laterality Date    ANTERIOR CERVICAL DISCECTOMY W/ FUSION        No current facility-administered medications on file prior to encounter.      Current Outpatient Prescriptions on File Prior to Encounter   Medication Sig Dispense Refill    atorvastatin (LIPITOR) 20 MG tablet Take 1 tablet (20 mg total) by mouth once daily. (Patient taking differently: Take 20 mg by mouth every morning. ) 30 tablet 5    fenofibric acid (FIBRICOR) 135 mg CpDR Take 1 capsule (135 mg total) by mouth once daily. (Patient taking differently: Take 1 capsule by mouth every morning. ) 30 capsule 5    propranolol (INDERAL LA) 80 MG 24 hr capsule Take 1 capsule (80 mg total) by mouth once daily. (Patient taking differently: Take 80 mg by mouth every morning. ) 30 capsule 5    valsartan (DIOVAN) 80 MG tablet Take 1 tablet (80 mg total) by mouth once daily. (Patient taking differently: Take 80 mg by mouth every morning. ) 30 tablet 5      Allergies: Patient has no known allergies.    Family History   Problem Relation Age of Onset    Hypertension Mother     Heart disease Father     Hypertension Father      Social History   Substance Use Topics    Smoking status: Never Smoker    Smokeless tobacco: Never Used    Alcohol use Yes     Review of Systems Unable to assess due to level of consciousness post anesthesia.     Objective:     Vitals:    Temp: 99.5 °F (37.5 °C)  Pulse: 93  Rhythm: normal sinus rhythm  BP: (!) 121/90  MAP (mmHg): 97  Resp: 19  SpO2: 97 %  O2 Device (Oxygen Therapy): room air    Temp  Min: 98 °F (36.7 °C)  Max: 99.5 °F (37.5 °C)  Pulse  Min: 59  Max: 98  BP  Min: 107/55  Max: 136/71  MAP (mmHg)  Min: 72  Max: 97  Resp  Min: 10  Max: 24  SpO2  Min: 94 %  Max: 98 %    02/01 0701 - 02/02 0700  In: 1505 [P.O.:180; I.V.:725]  Out:  2180 [Urine:2145; Drains:35]   Unmeasured Output  Urine Occurrence: 1  Stool Occurrence: 1       Physical Exam   Physical Exam:  GA: Lethargic, Difficult to arouse, Post anethesia, Follows simple commands: ie wiggle toes, open eyes, comfortable, no acute distress.   HEENT: No scleral icterus or JVD.   Pulmonary: Clear to auscultation Anterior. No wheezing, crackles, or rhonchi.  Cardiac: RRR S1 & S2 w/o rubs/murmurs/gallops.   Abdominal: Bowel sounds present x 4. No appreciable hepatosplenomegaly.  Skin: No jaundice, rashes, or visible lesions.  Neuro:  --GCS: E4 V3 M5  --Mental Status: Lethargic, Difficult to arouse, Post anethesia, Follows simple commands: ie wiggle toes, open eyes,    --CN II-XII grossly intact.   --Pupils 4mm, PERRL.   --Corneal reflex, gag, cough intact.  --LUE strength: 3/5 minimal movement  --RUE strength: 4/5 Contracted  --LLE strength: 5/5 localizes to pain   --RLE strength: 5/5 localizes to pain   Unable to test gait due to level of consciousness.    Today I personally reviewed pertinent medications, lines/drains/airways, lab results, notably:      Neurosurgery Physical Exam

## 2018-02-02 NOTE — PT/OT/SLP PROGRESS
"Physical Therapy Treatment    Patient Name:  Joel Deluca   MRN:  [id number]  Admitting Diagnosis: Cerebral cavernous malformation  Recent Surgery: Procedure(s) (LRB):  RESECTION-TUMOR, Craniotomy with Stealth Navigation for cavernous malformation  (Left)  CRANIOTOMY WITH STEALTH (Left) 2 Days Post-Op    Recommendations:     Discharge Recommendations:  outpatient PT   Discharge Equipment Recommendations: none   Barriers to discharge: None    Plan:     During this hospitalization, patient to be seen 4 x/week to address the above listed problems via gait training, therapeutic activities, therapeutic exercises, neuromuscular re-education  · Plan of Care Expires:  03/01/18   Plan of Care Reviewed with: patient, spouse    This Plan of care has been discussed with the patient who was involved in its development and understands and is in agreement with the identified goals and treatment plan    Subjective     Communicated with RN (Ever) prior to session.     Patient comments: "I have double vision"  Pt reports MD aware.  RN aware  RN reports pt just returned BTB after sitting UIC for most of the morning  Pain/Comfort:  · Pain Rating 1: 0/10  · Pain Rating Post-Intervention 1: 0/10    Objective:     Patient found with: blood pressure cuff, pulse ox (continuous), telemetry    Patient found sup in bed upon PT entry to room, agreeable to treatment.  Wife present in the room.    General Precautions: Standard, Cardiac fall, seizure   Orthopedic Precautions:N/A   Braces: N/A       BED MOBILITY (with vc's for safety):        Sup > sit at the EOB with sup exiting on the R side       Sit > sup with sup         TRANSFERS   Patient completed Sit <> Stand Transfer from EOB with sup with no assistive device    Patient completed Stand <> Sit Transfer to EOB with sup with no assistive device     GAIT:  RN present with portable monitor   Patient ambulated: 350ft. Pt performs multiple turns to both the R and L   Patient required: " SBA   Patient used:  No Assistive Device   Gait Pattern observed: reciprocal gait   Gait Deviation(s): mild instability with path deviation to the L, however, no overt LOB    Comments: Pt was instructed to look for his room number demonstrating no major difficulty finding it    EDUCATION  Education provided to pt regarding: importance and benefits of performing exercises and functional mobility.    Whiteboard updated with correct mobility information. RN/PCT notified.  Pt safe to transfer OOB and amb short distances with RN/PCT: Use no AD with SBA of 1 person.    Patient left supine, withall lines intact, call button in reach, RN notified and wife present    AM-PAC 6 CLICK MOBILITY  Turning over in bed (including adjusting bedclothes, sheets and blankets)?: 3  Sitting down on and standing up from a chair with arms (e.g., wheelchair, bedside commode, etc.): 3  Moving from lying on back to sitting on the side of the bed?: 3  Moving to and from a bed to a chair (including a wheelchair)?: 3  Need to walk in hospital room?: 3  Climbing 3-5 steps with a railing?: 3  Total Score: 18     Assessment:     Joel Deluca is a 53 y.o. male admitted with a medical diagnosis of Cerebral cavernous malformation.  He presents with the following impairments/functional limitations:  gait instability, impaired balance, visual deficits, decreased coordination, decreased safety awareness, edema. Pt doing well functionally, except for mild instability during gait, however, no overt LOB.  vc's for safety 2* mild impulsivity.  Pt will cont to benefit from skilled PT intervention to address deficits and improve functional mobility.     Rehab Prognosis:  excellent; patient would benefit from acute skilled PT services to address these deficits and reach maximum level of function.      GOALS:    Physical Therapy Goals        Problem: Physical Therapy Goal    Goal Priority Disciplines Outcome Goal Variances Interventions   Physical Therapy Goal      PT/OT, PT Ongoing (interventions implemented as appropriate)     Description:  Goals to be met by: 2018     Patient will increase functional independence with mobility by performin. Supine to sit with Modified [place]  2. Sit to supine with Modified [place]  3. Sit to stand transfer with Supervision  4. Gait  x 200 feet with Supervision with or without appropriate AD.  5. Lower extremity exercise program x15 reps per handout, with independence                      Time Tracking:     PT Received On: 18  PT Start Time: 1147     PT Stop Time: 1207  PT Total Time (min): 20 min     Billable Minutes: Therapeutic Activity 20    Treatment Type: Treatment  PT/PTA: PTA     PTA Visit Number: 1       Aminah Lyle PTA.  Pager [phone]    2018    .

## 2018-02-02 NOTE — SUBJECTIVE & OBJECTIVE
Interval History:  No significant events over night     Review of Systems    Review of symptoms  Constitutional: Denies fevers or chills.  Pulmonary: Denies shortness of breath or cough.  Cardiology: Denies chest pain or palpitations.  GI: Denies abdominal pain or constipation.  Neurologic: Denies new weakness,  headache, or paresthesias. + Double vision   Objective:     Vitals:  Temp: 99.3 °F (37.4 °C)  Pulse: 82  Rhythm: normal sinus rhythm  BP: 123/76  MAP (mmHg): 91  Resp: 16  SpO2: 96 %  O2 Device (Oxygen Therapy): room air    Temp  Min: 98 °F (36.7 °C)  Max: 99.3 °F (37.4 °C)  Pulse  Min: 59  Max: 98  BP  Min: 107/55  Max: 135/64  MAP (mmHg)  Min: 72  Max: 94  Resp  Min: 10  Max: 19  SpO2  Min: 94 %  Max: 98 %    02/01 0701 - 02/02 0700  In: 1505 [P.O.:180; I.V.:725]  Out: 2180 [Urine:2145; Drains:35]   Unmeasured Output  Urine Occurrence: 1  Stool Occurrence: 1       Physical Exam      Physical Exam:  GA: Alert, comfortable, no acute distress, up in chair at bedside   HEENT: No scleral icterus or JVD.   Pulmonary: Clear to auscultation A/L.   Cardiac: RRR S1 & S2 w/o rubs/murmurs/gallops.   Abdominal: Bowel sounds present x 4.   Skin: No jaundice, rashes, or visible lesions.  Neuro:  --GCS: E4 V5 M6   --Mental Status:  Awake alert oriented follows all commands   --Double vision .   --Pupils 3mm, PERRL.   --Corneal reflex, gag, cough intact.  --LUE strength: 5/5  --RUE strength: 5/5  --LLE strength: 5/5  --RLE strength: 5/5    Unable to test gait due to level of consciousness.    Medications:  Continuous Scheduled  atorvastatin 20 mg QAM   bacitracin  BID   dexamethasone 4 mg Q6H   famotidine 20 mg BID   fenofibrate micronized 134 mg Daily with breakfast   heparin (porcine) 5,000 Units Q8H   levETIRAcetam 500 mg Q12H   mupirocin 1 g BID   propranolol 80 mg QAM   senna-docusate 8.6-50 mg 1 tablet BID   valsartan 80 mg QAM   PRN  acetaminophen 650 mg Q6H PRN   acetaminophen 650 mg Q6H PRN   acetaminophen 650 mg  Q4H PRN   dextrose 50% 12.5 g PRN   dextrose 50% 25 g PRN   glucagon (human recombinant) 1 mg PRN   glucose 16 g PRN   glucose 24 g PRN   hydrALAZINE 10 mg Q4H PRN   insulin aspart 0-5 Units QID (AC + HS) PRN   labetalol 10 mg Q4H PRN   magnesium sulfate IVPB 2 g PRN   magnesium sulfate IVPB 4 g PRN   ondansetron 4 mg Q12H PRN   potassium chloride 10% 40 mEq PRN   potassium chloride 10% 40 mEq PRN   potassium chloride 10% 40 mEq PRN   potassium, sodium phosphates 2 packet PRN   potassium, sodium phosphates 2 packet PRN   potassium, sodium phosphates 2 packet PRN     Today I personally reviewed pertinent medications, lines/drains/airways, imaging, lab results,     Diet  Diet Adult Regular  Diet Adult Regular

## 2018-02-03 VITALS
WEIGHT: 210 LBS | BODY MASS INDEX: 33.75 KG/M2 | TEMPERATURE: 98 F | SYSTOLIC BLOOD PRESSURE: 153 MMHG | DIASTOLIC BLOOD PRESSURE: 91 MMHG | HEART RATE: 71 BPM | HEIGHT: 66 IN | RESPIRATION RATE: 16 BRPM | OXYGEN SATURATION: 93 %

## 2018-02-03 PROCEDURE — 63600175 PHARM REV CODE 636 W HCPCS: Performed by: PHYSICIAN ASSISTANT

## 2018-02-03 PROCEDURE — 25000003 PHARM REV CODE 250: Performed by: NEUROLOGICAL SURGERY

## 2018-02-03 PROCEDURE — 63600175 PHARM REV CODE 636 W HCPCS: Performed by: NEUROLOGICAL SURGERY

## 2018-02-03 PROCEDURE — 25000003 PHARM REV CODE 250: Performed by: NURSE PRACTITIONER

## 2018-02-03 PROCEDURE — 99024 POSTOP FOLLOW-UP VISIT: CPT | Mod: ,,, | Performed by: PHYSICIAN ASSISTANT

## 2018-02-03 RX ORDER — LEVETIRACETAM 500 MG/1
500 TABLET ORAL EVERY 12 HOURS
Qty: 60 TABLET | Refills: 1 | Status: SHIPPED | OUTPATIENT
Start: 2018-02-03 | End: 2019-08-12

## 2018-02-03 RX ORDER — FAMOTIDINE 20 MG/1
20 TABLET, FILM COATED ORAL 2 TIMES DAILY
Qty: 60 TABLET | Refills: 0 | Status: SHIPPED | OUTPATIENT
Start: 2018-02-03 | End: 2020-02-10

## 2018-02-03 RX ORDER — DEXAMETHASONE 2 MG/1
TABLET ORAL
Qty: 50 TABLET | Refills: 0 | Status: SHIPPED | OUTPATIENT
Start: 2018-02-03 | End: 2018-08-01

## 2018-02-03 RX ORDER — BACITRACIN 500 [USP'U]/G
OINTMENT TOPICAL 2 TIMES DAILY
Refills: 0
Start: 2018-02-03 | End: 2019-08-12

## 2018-02-03 RX ADMIN — MUPIROCIN 1 G: 20 OINTMENT TOPICAL at 09:02

## 2018-02-03 RX ADMIN — DEXAMETHASONE SODIUM PHOSPHATE 4 MG: 4 INJECTION, SOLUTION INTRAMUSCULAR; INTRAVENOUS at 05:02

## 2018-02-03 RX ADMIN — STANDARDIZED SENNA CONCENTRATE AND DOCUSATE SODIUM 1 TABLET: 8.6; 5 TABLET, FILM COATED ORAL at 08:02

## 2018-02-03 RX ADMIN — ATORVASTATIN CALCIUM 20 MG: 20 TABLET, FILM COATED ORAL at 07:02

## 2018-02-03 RX ADMIN — DEXAMETHASONE SODIUM PHOSPHATE 4 MG: 4 INJECTION, SOLUTION INTRAMUSCULAR; INTRAVENOUS at 12:02

## 2018-02-03 RX ADMIN — FAMOTIDINE 20 MG: 20 TABLET, FILM COATED ORAL at 08:02

## 2018-02-03 RX ADMIN — HEPARIN SODIUM 5000 UNITS: 5000 INJECTION, SOLUTION INTRAVENOUS; SUBCUTANEOUS at 05:02

## 2018-02-03 RX ADMIN — VALSARTAN 80 MG: 80 TABLET ORAL at 07:02

## 2018-02-03 RX ADMIN — PROPRANOLOL HYDROCHLORIDE 80 MG: 80 CAPSULE, EXTENDED RELEASE ORAL at 07:02

## 2018-02-03 RX ADMIN — BACITRACIN: 500 OINTMENT TOPICAL at 08:02

## 2018-02-03 RX ADMIN — LEVETIRACETAM 500 MG: 500 TABLET ORAL at 08:02

## 2018-02-03 NOTE — ASSESSMENT & PLAN NOTE
52 yo male with pmh of mesencephalic cavernous vascular malformation now s/p craniotomy for subtemporal transtentorial resection of mass (1/31).  -Neurologically stable  -Leave incision open to air, bacitracin to incision BID  -CTH done yest stable  -Will start 2 week dex taper today  -PPI while on dex  -Leukocytosis - afebrile, likely secondary to steroids  -PT/Ot recs for outpatient therapy  -Discharge home today  -Discussed with Dr. Molina  -Follow up in 2 weeks

## 2018-02-03 NOTE — PROGRESS NOTES
[facility]  Neurosurgery  Progress Note    Subjective:     History of Present Illness: Joel Deluca is a 53 y.o. male with hx of HTN, HLD, cervical disc disease with myelopathy s/p ACDF, CN4 palsy who presents to Neuro Critical Care s/p Brainstem/Ismael Cavernous Malformation Resection. Per the chart, patient  has sensation of being wet on the Right side of body, loss of balance, slurring, difficulty directing left hand, and double vision. MRI shows 1.8cm lesion in L posterior brainstem/ismael with heterogenous signal suggesting a cavernous malformation.  Surgery recommending subtemporal approach for brainstem cavernoma resection with placement of lumbar drain which has been removed. Patient currently has a subgaleal drain. Patient will be admitted to Neuro Critical Care for a higher level of care.        Post-Op Info:  Procedure(s) (LRB):  RESECTION-TUMOR, Craniotomy with Stealth Navigation for cavernous malformation  (Left)  CRANIOTOMY WITH STEALTH (Left)   3 Days Post-Op     Interval History: Patient denies any headache, weakness, paresthesias.  Still with diplopia, using eye patch.  Wife at bedside, feel they are ready for discharge today.    Medications:  Continuous Infusions:  Scheduled Meds:   atorvastatin  20 mg Oral QAM    bacitracin   Topical (Top) BID    dexamethasone  4 mg Intravenous Q6H    famotidine  20 mg Oral BID    fenofibrate micronized  134 mg Oral Daily with breakfast    heparin (porcine)  5,000 Units Subcutaneous Q8H    levETIRAcetam  500 mg Oral Q12H    mupirocin  1 g Nasal BID    propranolol  80 mg Oral QAM    senna-docusate 8.6-50 mg  1 tablet Oral BID    valsartan  80 mg Oral QAM     PRN Meds:acetaminophen, acetaminophen, acetaminophen, dextrose 50%, dextrose 50%, glucagon (human recombinant), glucose, glucose, hydrALAZINE, insulin aspart, labetalol, magnesium sulfate IVPB, magnesium sulfate IVPB, ondansetron, potassium chloride 10%, potassium chloride 10%,  potassium chloride 10%, potassium, sodium phosphates, potassium, sodium phosphates, potassium, sodium phosphates     Review of Systems  Objective:     Weight: 95.3 kg (210 lb)  Body mass index is 33.89 kg/m².  Vital Signs (Most Recent):  Temp: 97.7 °F (36.5 °C) (02/03/18 0825)  Pulse: 71 (02/03/18 0825)  Resp: 16 (02/03/18 0825)  BP: (!) 153/91 (02/03/18 0825)  SpO2: (!) 93 % (02/03/18 0825) Vital Signs (24h Range):  Temp:  [97.2 °F (36.2 °C)-99.5 °F (37.5 °C)] 97.7 °F (36.5 °C)  Pulse:  [71-93] 71  Resp:  [12-24] 16  SpO2:  [93 %-97 %] 93 %  BP: (121-153)/(63-91) 153/91       Neurosurgery Physical Exam   General: well developed, well nourished, no distress  Head: normocephalic, atraumatic  Neurologic: Alert and oriented. Thought content appropriate  GCS: Motor: 6/Verbal: 5/Eyes: 4 GCS Total: 15  Mental Status: Awake, Alert, Oriented x 4  Language: No aphasia  Speech: No dysarthria  Cranial nerves: face symmetric, tongue midline, CN II-XII grossly intact.   Eyes: pupils equal, round, reactive to light with accommodation, EOMI  Pulmonary: normal respirations, not labored, no accessory muscles used  Abdomen: soft, non-distended, not tender to palpation  Sensory: intact to light touch throughout  Motor Strength: Moves all extremities spontaneously with good tone.  Full strength upper and lower extremities. No abnormal movements seen.     Strength  Deltoids Triceps Biceps Wrist Extension Wrist Flexion Hand    Upper: R 5/5 5/5 5/5 5/5 5/5 5/5    L 5/5 5/5 5/5 5/5 5/5 5/5     Iliopsoas Quadriceps Knee  Flexion Tibialis  anterior Gastro- cnemius EHL   Lower: R 5/5 5/5 5/5 5/5 5/5 5/5    L 5/5 5/5 5/5 5/5 5/5 5/5     Pronator Drift: no drift noted  Finger-to-nose: Intact bilaterally  Castañeda: absent  Clonus: absent  Babinski: absent  Pulses: 2+ and symmetric radial and dorsalis pedis  Skin: warm, dry and intact, no rashes  Incision is clean, dry, and intact with skin well approximated with staples.  There is no  drainage, erythema, or edema.        Significant Labs:    Recent Labs  Lab 02/01/18  1704 02/02/18  0315 02/02/18  1318   GLU  --  164* 150*   NA  --  141 142   K 3.8 6.1* 4.1   CL  --  110 107   CO2  --  23 24   BUN  --  17 16   CREATININE  --  0.9 0.8   CALCIUM  --  8.5* 9.2   MG 2.3 2.4  --        Recent Labs  Lab 02/02/18  0315   WBC 19.04*   HGB 10.1*   HCT 30.0*        No results for input(s): LABPT, INR, APTT in the last 48 hours.  Microbiology Results (last 7 days)     ** No results found for the last 168 hours. **          Significant Diagnostics:  I personally reviewed CT Head - Postoperative changes of recent left midbrain cavernous malformation resection without apparent complication.    Assessment/Plan:     * Cerebral cavernous malformation    54 yo male with pmh of mesencephalic cavernous vascular malformation now s/p craniotomy for subtemporal transtentorial resection of mass (1/31).  -Neurologically stable  -Leave incision open to air, bacitracin to incision BID  -CTH done yest stable  -Will start 2 week dex taper today  -PPI while on dex  -Leukocytosis - afebrile, likely secondary to steroids  -PT/Ot recs for outpatient therapy  -Discharge home today  -Discussed with Dr. Molina  -Follow up in 2 weeks             Colleen Zamora PA-C  Neurosurgery  [facility]

## 2018-02-03 NOTE — HPI
Joel Deluca is a 53 y.o. male with hx of HTN, HLD, cervical disc disease with myelopathy s/p ACDF, CN4 palsy who presents to Neuro Critical Care s/p Brainstem/Ismael Cavernous Malformation Resection. Per the chart, patient  has sensation of being wet on the Right side of body, loss of balance, slurring, difficulty directing left hand, and double vision. MRI shows 1.8cm lesion in L posterior brainstem/ismael with heterogenous signal suggesting a cavernous malformation.  Surgery recommending subtemporal approach for brainstem cavernoma resection with placement of lumbar drain which has been removed. Patient currently has a subgaleal drain. Patient will be admitted to Neuro Critical Care for a higher level of care.

## 2018-02-03 NOTE — DISCHARGE SUMMARY
[facility]  Neurosurgery  Discharge Summary      Patient Name: Jeol Deluca  MRN: [id number]  Admission Date: 1/31/2018  Hospital Length of Stay: 3 days  Discharge Date and Time: 2/3/2018 10:02 AM  Attending Physician: Avery Mejia MD   Discharging Provider: Colleen Zamora PA-C  Primary Care Provider: Otis Martinez MD    HPI:   Joel Deluca is a 53 y.o. male with hx of HTN, HLD, cervical disc disease with myelopathy s/p ACDF, CN4 palsy who presents to Neuro Critical Care s/p Brainstem/Kim Cavernous Malformation Resection. Per the chart, patient  has sensation of being wet on the Right side of body, loss of balance, slurring, difficulty directing left hand, and double vision. MRI shows 1.8cm lesion in L posterior brainstem/kim with heterogenous signal suggesting a cavernous malformation.  Surgery recommending subtemporal approach for brainstem cavernoma resection with placement of lumbar drain which has been removed. Patient currently has a subgaleal drain. Patient will be admitted to Neuro Critical Care for a higher level of care.        Procedure(s) (LRB):  RESECTION-TUMOR, Craniotomy with Stealth Navigation for cavernous malformation  (Left)  CRANIOTOMY WITH STEALTH (Left)     Hospital Course: The patient was taken to the OR for the above mentioned procedure and tolerated the procedure well.  After surgery, the patient was extubated and taken to recovery in stable condition.  After observation in recovery, the patient was transferred to the neurosurgical ICU.  The subgaleal drain was removed POD#2.  He was then stable for discharge to the floor.  PT was consulted, the patient progressed, and was cleared to go home with home health.  At time of discharge the patient was neurologically stable, was afebrile, and vital signs were stable.  The patient was tolerating a diet and voiding without difficulty.  The patient is being discharged to their home.  Discharge instructions were  verbally discussed with the patient, including wound care and follow up appointments.  The patient was also given a discharge instruction sheet explaining the aforementioned discussion.  The patient verbalized understanding of instructions and agreed to the plan.    Consults:   Consults         Status Ordering Provider     IP consult to Neuro Critical Care  Once     Provider:  (Not yet assigned)    Acknowledged SOURAV ALMENDAREZ,          Significant Diagnostic Studies: CT Head -   Postoperative changes of recent left midbrain cavernous malformation resection without apparent complication.    Pending Diagnostic Studies:     None        Final Active Diagnoses:    Diagnosis Date Noted POA    PRINCIPAL PROBLEM:  Cerebral cavernous malformation [Q28.3] 05/31/2017 Yes    Cavernous malformation [Q28.3] 01/31/2018 Yes    Seizure prophylaxis [Z29.8] 01/31/2018 Not Applicable    Essential hypertension [I10] 02/27/2015 Yes    Hyperlipidemia [E78.5]  Yes      Problems Resolved During this Admission:    Diagnosis Date Noted Date Resolved POA      Discharged Condition: good    Disposition: Home or Self Care    Follow Up: 2 weeks with nsurg    Patient Instructions:     Ambulatory Referral to Physical/Occupational Therapy   Referral Priority: Routine Referral Type: Physical Medicine   Referral Reason: Specialty Services Required    Requested Specialty: Physical Therapy    Number of Visits Requested: 1      Ambulatory Referral to Physical/Occupational Therapy   Referral Priority: Routine Referral Type: Physical Medicine   Referral Reason: Specialty Services Required    Requested Specialty: Occupational Therapy    Number of Visits Requested: 1        Medications:  Transfer Medications (for Discharge Readmit only):   No current facility-administered medications for this encounter.      Current Outpatient Prescriptions   Medication Sig Dispense Refill    atorvastatin (LIPITOR) 20 MG tablet Take 1 tablet (20 mg total) by mouth once  daily. (Patient taking differently: Take 20 mg by mouth every morning. ) 30 tablet 5    fenofibric acid (FIBRICOR) 135 mg CpDR Take 1 capsule (135 mg total) by mouth once daily. (Patient taking differently: Take 1 capsule by mouth every morning. ) 30 capsule 5    propranolol (INDERAL LA) 80 MG 24 hr capsule Take 1 capsule (80 mg total) by mouth once daily. (Patient taking differently: Take 80 mg by mouth every morning. ) 30 capsule 5    valsartan (DIOVAN) 80 MG tablet Take 1 tablet (80 mg total) by mouth once daily. (Patient taking differently: Take 80 mg by mouth every morning. ) 30 tablet 5    bacitracin 500 unit/gram ointment Apply topically 2 (two) times daily.  0    dexamethasone (DECADRON) 2 MG tablet Take 2tab TID x 3d, then 1 tab QID x 3d, then 1 tab TID x 3d, then 1 tab BID x 3d, then 1 tab qd x 3d, then off. 50 tablet 0    famotidine (PEPCID) 20 MG tablet Take 1 tablet (20 mg total) by mouth 2 (two) times daily. 60 tablet 0    levETIRAcetam (KEPPRA) 500 MG Tab Take 1 tablet (500 mg total) by mouth every 12 (twelve) hours. 60 tablet 1       Colleen Zamora PA-C  Neurosurgery [facility]

## 2018-02-03 NOTE — SUBJECTIVE & OBJECTIVE
Interval History: Patient denies any headache, weakness, paresthesias.  Still with diplopia, using eye patch.  Wife at bedside, feel they are ready for discharge today.    Medications:  Continuous Infusions:  Scheduled Meds:   atorvastatin  20 mg Oral QAM    bacitracin   Topical (Top) BID    dexamethasone  4 mg Intravenous Q6H    famotidine  20 mg Oral BID    fenofibrate micronized  134 mg Oral Daily with breakfast    heparin (porcine)  5,000 Units Subcutaneous Q8H    levETIRAcetam  500 mg Oral Q12H    mupirocin  1 g Nasal BID    propranolol  80 mg Oral QAM    senna-docusate 8.6-50 mg  1 tablet Oral BID    valsartan  80 mg Oral QAM     PRN Meds:acetaminophen, acetaminophen, acetaminophen, dextrose 50%, dextrose 50%, glucagon (human recombinant), glucose, glucose, hydrALAZINE, insulin aspart, labetalol, magnesium sulfate IVPB, magnesium sulfate IVPB, ondansetron, potassium chloride 10%, potassium chloride 10%, potassium chloride 10%, potassium, sodium phosphates, potassium, sodium phosphates, potassium, sodium phosphates     Review of Systems  Objective:     Weight: 95.3 kg (210 lb)  Body mass index is 33.89 kg/m².  Vital Signs (Most Recent):  Temp: 97.7 °F (36.5 °C) (02/03/18 0825)  Pulse: 71 (02/03/18 0825)  Resp: 16 (02/03/18 0825)  BP: (!) 153/91 (02/03/18 0825)  SpO2: (!) 93 % (02/03/18 0825) Vital Signs (24h Range):  Temp:  [97.2 °F (36.2 °C)-99.5 °F (37.5 °C)] 97.7 °F (36.5 °C)  Pulse:  [71-93] 71  Resp:  [12-24] 16  SpO2:  [93 %-97 %] 93 %  BP: (121-153)/(63-91) 153/91       Neurosurgery Physical Exam   General: well developed, well nourished, no distress  Head: normocephalic, atraumatic  Neurologic: Alert and oriented. Thought content appropriate  GCS: Motor: 6/Verbal: 5/Eyes: 4 GCS Total: 15  Mental Status: Awake, Alert, Oriented x 4  Language: No aphasia  Speech: No dysarthria  Cranial nerves: face symmetric, tongue midline, CN II-XII grossly intact.   Eyes: pupils equal, round, reactive to  light with accommodation, EOMI  Pulmonary: normal respirations, not labored, no accessory muscles used  Abdomen: soft, non-distended, not tender to palpation  Sensory: intact to light touch throughout  Motor Strength: Moves all extremities spontaneously with good tone.  Full strength upper and lower extremities. No abnormal movements seen.     Strength  Deltoids Triceps Biceps Wrist Extension Wrist Flexion Hand    Upper: R 5/5 5/5 5/5 5/5 5/5 5/5    L 5/5 5/5 5/5 5/5 5/5 5/5     Iliopsoas Quadriceps Knee  Flexion Tibialis  anterior Gastro- cnemius EHL   Lower: R 5/5 5/5 5/5 5/5 5/5 5/5    L 5/5 5/5 5/5 5/5 5/5 5/5     Pronator Drift: no drift noted  Finger-to-nose: Intact bilaterally  Castañeda: absent  Clonus: absent  Babinski: absent  Pulses: 2+ and symmetric radial and dorsalis pedis  Skin: warm, dry and intact, no rashes  Incision is clean, dry, and intact with skin well approximated with staples.  There is no drainage, erythema, or edema.        Significant Labs:    Recent Labs  Lab 02/01/18  1704 02/02/18  0315 02/02/18  1318   GLU  --  164* 150*   NA  --  141 142   K 3.8 6.1* 4.1   CL  --  110 107   CO2  --  23 24   BUN  --  17 16   CREATININE  --  0.9 0.8   CALCIUM  --  8.5* 9.2   MG 2.3 2.4  --        Recent Labs  Lab 02/02/18  0315   WBC 19.04*   HGB 10.1*   HCT 30.0*        No results for input(s): LABPT, INR, APTT in the last 48 hours.  Microbiology Results (last 7 days)     ** No results found for the last 168 hours. **          Significant Diagnostics:  I personally reviewed CT Head - Postoperative changes of recent left midbrain cavernous malformation resection without apparent complication.

## 2018-02-03 NOTE — DISCHARGE INSTRUCTIONS
Please follow ONLY the instructions that are checked below.    Activity Restrictions:  [x]  Return to work will be determined on an individual basis.  [x]  No lifting greater than 10 pounds.  [x]  No driving or operating machinery:  [x]  until cleared by your surgeon.  [x]  while taking narcotic pain medications or muscle relaxants.  [x]  Increase ambulation over the next 2 weeks so that you are walking 2 miles per day at 2 weeks post-operatively.  [x]  Walk on paved surfaces only. It is okay to walk up and down stairs while holding onto a side rail.  [x]  No sexual activity for 2-3 weeks.    Discharge Medication/Follow-up:  [x]  Please refer to discharge medication reconciliation form.  [x]  Do not take ANY non-steroidal anti-inflammatory drugs (NSAIDS), including the following: ibuprofen, naprosyn, Aleve, Advil, Indocin, Mobic, or Celebrex for:  []  4 weeks  [x]  8 weeks  []  6 months  []  Prescriptions for appropriate medication will be given upon discharge.   []  Pain control:  Tylenol          []  Other:             [x]  Take docusate (Colace 100 mg): take one capsule a day as needed for constipation. You can get this over the counter.  [x]  Follow-up appointment:  [x]  10-14 days post-op for wound check by physician assistant/nurse  [x]  4-6 weeks with MD:  [x]  with CT / MRI  []  without CT / MRI  [x]  An appointment will be mailed to you.    Wound Care:  [x]  No bandage required. Keep your incision open to the air.  [x]  You may shower on the 2nd day after your surgery. Have the force of water hit you opposite from the incision. Pat the incision dry after your shower; do not scrub the incision.  [x]  You cannot take a bath until 8 weeks after surgery.  [x]  Apply bacitracin to incision twice a day for 14   more days.    Call your doctor or go to the Emergency Room for any signs of infection, including: increased redness, drainage, pain, or fever (temperature ?101.5 for 24 hours). Call your doctor or go to  the Emergency Room if there are any localized neurological changes; problems with speech, vision, numbness, tingling, weakness, or severe headache; or for other concerns.    Special Instructions:  [x]  No use of tobacco products.  [x]  Diet: Please eat a regular diet as tolerated.  []  Other diet:              Specific physician instructions:           Physicians need 3 days' notice for pain medicine to be refilled. Pain medicine will only be refilled between 8 AM and 5 PM, Monday through Friday, due to Food and Drug Administration regulation of documentation.    If you have any questions about this form, please call [phone].    Form No. 68948 (Revised 10/31/2013)

## 2018-02-04 LAB
BLD PROD TYP BPU: NORMAL
BLOOD UNIT EXPIRATION DATE: NORMAL
BLOOD UNIT TYPE CODE: 5100
BLOOD UNIT TYPE: NORMAL
CODING SYSTEM: NORMAL
DISPENSE STATUS: NORMAL
TRANS ERYTHROCYTES VOL PATIENT: NORMAL ML

## 2018-02-07 ENCOUNTER — OFFICE VISIT (OUTPATIENT)
Dept: FAMILY MEDICINE | Facility: CLINIC | Age: 53
End: 2018-02-07
Payer: COMMERCIAL

## 2018-02-07 VITALS
HEART RATE: 76 BPM | SYSTOLIC BLOOD PRESSURE: 120 MMHG | DIASTOLIC BLOOD PRESSURE: 70 MMHG | WEIGHT: 214.19 LBS | HEIGHT: 66 IN | RESPIRATION RATE: 18 BRPM | BODY MASS INDEX: 34.42 KG/M2

## 2018-02-07 DIAGNOSIS — E78.5 HYPERLIPIDEMIA, UNSPECIFIED HYPERLIPIDEMIA TYPE: ICD-10-CM

## 2018-02-07 DIAGNOSIS — Q28.3 CAVERNOUS MALFORMATION: Primary | ICD-10-CM

## 2018-02-07 DIAGNOSIS — I10 ESSENTIAL HYPERTENSION: ICD-10-CM

## 2018-02-07 DIAGNOSIS — Z29.89 SEIZURE PROPHYLAXIS: ICD-10-CM

## 2018-02-07 PROCEDURE — 3008F BODY MASS INDEX DOCD: CPT | Mod: S$GLB,,, | Performed by: FAMILY MEDICINE

## 2018-02-07 PROCEDURE — 99214 OFFICE O/P EST MOD 30 MIN: CPT | Mod: S$GLB,,, | Performed by: FAMILY MEDICINE

## 2018-02-07 PROCEDURE — 99999 PR PBB SHADOW E&M-EST. PATIENT-LVL III: CPT | Mod: PBBFAC,,, | Performed by: FAMILY MEDICINE

## 2018-02-07 RX ORDER — ATORVASTATIN CALCIUM 20 MG/1
20 TABLET, FILM COATED ORAL DAILY
Qty: 30 TABLET | Refills: 5 | Status: SHIPPED | OUTPATIENT
Start: 2018-02-07 | End: 2018-08-01 | Stop reason: SDUPTHER

## 2018-02-07 RX ORDER — VALSARTAN 80 MG/1
80 TABLET ORAL DAILY
Qty: 30 TABLET | Refills: 5 | Status: SHIPPED | OUTPATIENT
Start: 2018-02-07 | End: 2018-08-01 | Stop reason: SDUPTHER

## 2018-02-07 RX ORDER — FENOFIBRIC ACID 135 MG/1
1 CAPSULE, DELAYED RELEASE ORAL DAILY
Qty: 30 CAPSULE | Refills: 5 | Status: SHIPPED | OUTPATIENT
Start: 2018-02-07 | End: 2018-08-01 | Stop reason: SDUPTHER

## 2018-02-07 RX ORDER — PROPRANOLOL HYDROCHLORIDE 80 MG/1
80 CAPSULE, EXTENDED RELEASE ORAL DAILY
Qty: 30 CAPSULE | Refills: 5 | Status: SHIPPED | OUTPATIENT
Start: 2018-02-07 | End: 2018-08-01 | Stop reason: SDUPTHER

## 2018-02-07 NOTE — PROGRESS NOTES
Pt is a 53 y.o. male who presents for check up for   Encounter Diagnoses   Name Primary?    Hyperlipidemia     Hypertension    . Doing well on current meds. Denies any side effects. Prevention is up to date.  He refuses the flu shot    HPI: White male here for checkup today. His blood pressure is running well. He denies side effects such as dry cough, lightheadedness, fatigue. His cholesterol medications were well tolerated. He does not have any side effects such as myalgias. He denies fever chills weight loss or weight gain. He denies chest pain.  Patient recently had blood work done through work.  Lab results were reviewed.  This will be scanned into the computer.  Having more neck pain.  History of cervical fusion.  Gets paresthesia in right hand. pain comes and goes.  Very painful when he lies down.  He is tolerating it and does not want anything done at this time.    He was diagnosed with a cavernous hemangioma in the brain.  S/P resection last week.  Doing remarkably well.  His speech is a little slow.  He is still seeing double vision.      ROS:   Gen.: No fever, chills, weight loss, weight gain  HEENT: No headaches, sinus congestion, sore throat, change in vision  CV: No chest pain  RESP: No shortness of breath, wheezing, cough  GI: No change in bowel habits, no diarrhea, no abdominal pain, no hematochezia  : No dysuria, frequency  MSK: Occasional left hip pain  NEURO: No numbness, weakness  ENDO: No polyuria, polydipsia, heat or cold intolerance    PMH, PSH, ALLERGIES, SH, FH reviewed in office note on 4/10/11  Medications reconciled in the nurse's notes    PHYSICAL EXAM;   Vitals:    02/07/18 1225   BP: 120/70   Pulse: 76   Resp: 18     APPEARANCE: Well nourished, well developed, in no acute distress.   HEAD: Normocephalic, atraumatic.  EYES: PERRL. EOMI.   EARS: TM's intact. Light reflex normal. No retraction or perforation.  Very hard of hearing   NOSE: Mucosa pink. Airway clear.  MOUTH & THROAT: No  tonsillar enlargement. No pharyngeal erythema or exudate. No stridor.  NECK: Supple.  No carotid bruits.  No JVD   CHEST: Lungs clear to auscultation.  CARDIOVASCULAR: Normal S1, S2. No rubs, murmurs or gallops.  MUSCULOSKELETAL: No clubbing cyanosis or edema  SKIN: No rashes or pigmented moles.  NEUROLOGIC:    Cranial Nerves: III-XII grossly intact. still suffers with double vision    Motor: No focal deficits   MENTAL STATUS: Normal orientation to person, place and time, normal affect, normal flow thought, normal memory.    Lab Results   Component Value Date    LDLCALC 136.0 01/06/2017   Trig 339  HDL 27  LDLc 119    ASSESSMENT/PLAN:    HTN -   Encouraged patient to avoid table salt and try to follow a 2 g sodium diet  Continue propranolol 80 mg daily, Diovan 80 mg p.o. daily.  Walk 20 minutes per day.  Try to lose weight.    Dyslipidemia-  Low fat diet. Avoid sweets. A 10 pound weight loss by the next visit as a  good goal. Increase consumption of fruits and vegetables, fish and chicken.  Continue Lipitor 10 mg daily, Trilipix 135 mg p.o. daily.    Essential hypertension  -     valsartan (DIOVAN) 80 MG tablet; Take 1 tablet (80 mg total) by mouth once daily.  Dispense: 30 tablet; Refill: 5  -     propranolol (INDERAL LA) 80 MG 24 hr capsule; Take 1 capsule (80 mg total) by mouth once daily.  Dispense: 30 capsule; Refill: 5    Hyperlipidemia, unspecified hyperlipidemia type  -     fenofibric acid (FIBRICOR) 135 mg CpDR; Take 1 capsule (135 mg total) by mouth once daily.  Dispense: 30 capsule; Refill: 5  -     atorvastatin (LIPITOR) 20 MG tablet; Take 1 tablet (20 mg total) by mouth once daily.  Dispense: 30 tablet; Refill: 5    S/P resection of Cerebral cavernous malformation  Keep appointment with neurosurgery    Cervical disc disease with myelopathy  Follow-up with neurosurgery for further evaluation    Next appointment will be in 6 months.

## 2018-02-12 NOTE — OP NOTE
DATE OF PROCEDURE:  01/31/2018    ATTENDING PHYSICIAN:  Avery Mejia M.D.    PREOPERATIVE DIAGNOSIS:  Brainstem cavernous malformation.    POSTOPERATIVE DIAGNOSIS:  Brainstem cavernous malformation.    PROCEDURES PERFORMED:  Left temporal craniotomy, transtentorial removal of   brainstem cavernous malformation with neuronavigation and microsurgery,   intraoperative neuromonitoring, insertion of lumbar drain and repair of   incidental tympanic attic defect.    SURGEON:  Surya Nolasco M.D.    COSURGEON:  Avery Mejia M.D.    ASSISTANT:  Mine Alvarenga M.D. (RES) ([facility] Resident Neurosurgery).    ANESTHESIA:  General endotracheal.    ESTIMATED BLOOD LOSS:  200 mL.    DRAINS:  Hemovac subgaleal.    CONDITION AT THE END OF PROCEDURE:  Satisfactory.    BRIEF HISTORY:  This 53-year-old man presented with a left trochlear nerve palsy   on 05/26/2017.  MRI showed a left-sided pontomesencephalic cavernous   malformation at the junction of superior cerebellar peduncle and inferior   colliculus, which appeared to have bled.  This was followed and increased in   size over 6 months and it was felt that the patient was at risk for additional   bleeding and decision was made to resect the lesion.  Preoperative MRI with   navigation sequence was obtained for intraoperative neuronavigation.    PROCEDURE IN DETAIL:  The patient was brought to the Operating Room in a supine   position under premedication, intubated and induced with general anesthesia.  A   cannula was placed in the right radial artery for continuous blood pressure   monitoring.  A Morse catheter was inserted.  Sequential compression devices were   applied to the legs and various intravenous lines started.  He was then rolled   to the lateral decubitus position with left side down.  A lumbar puncture was   performed and a Medtronic lumbar drain inserted for CSF drainage during   operation.  This was taped and bandaged into place.  The patient was  returned   back to the supine position.  The head was somewhat elevated, turned to the   right and immobilized with the Matthews 3-point skeletal fixation device.  The   bed was rolled somewhat to the right to bring the temporoparietal area more   horizontal to the floor.  The neuronavigation arc was then attached to the   Matthews headrest and the head registered to the navigation system with surface   recognition.  Neuromonitoring was established.  The hair in the left temporal   area was shaved and this portion of the scalp prepped with Betadine and draped   in a sterile fashion.  A horseshoe temporal incision was outlined and injected   with 1% Xylocaine and epinephrine.  The incision was carried through the skin   and galea and bleeding controlled in the skin margins and skin flap with Emmanuel   clips.  The skin was dissected over a short distance and the temporalis muscle   and fascia opened with the Bovie and dissected with a periosteal elevator and   retracted inferiorly with fishhook retractors.  Wenham holes were made inferiorly,   anteriorly and posteriorly and the craniotomy cut with a high-speed drill,   elevated and removed from the operative field.  Some additional temporal squama   and area over the petrous bone was drilled down to flatten the skull base.  It   was noted that there was an opening in the tegmen tympani and over the middle   ear.  This seemed to be preexisting and not from drilling.  It would be later   repaired.  The dura was tacked up to the bone with 4-0 Nurolon sutures and the   Chou retractor affixed to the Matthews headrest.  Clean towel was placed   around the craniotomy opening and the operating microscope brought into the   field.    The dura was opened with a curvilinear incision and retracted inferiorly with   4-0 Nurolon sutures.  The vein of Tanmay was identified.  The temporal lobe was   carefully retracted more anterior to this.  Small veins entering into the    sphenopalatine sinus were coagulated so that the temporal lobe could be lifted   and this was followed down to the edge of the tentorium.  Opening the arachnoid   of the ambient cistern, the trochlear nerve was identified and this was freed   from the edge of the tentorium and also off of the brainstem.  The cavernous   malformation could then be seen with yellow and bluish discoloration of the   dorsal brainstem.  To get adequate exposure, it was necessary to incise the   tentorium and coagulate and elevate the flaps of tentorium and with this, the   malformation could be directly addressed.  A small incision was made into the   brainstem.  Immediately, the cavernous malformation was visualized.  There was   hemosiderin, some old blood and scar tissue as well as the more vascular   component.  This  reasonably well from the brain with microsurgical   technique.  Some of the malformation could be coagulated and then removed   piecemeal.  With careful dissection, gradually the cavernous malformation could   be lifted out of the brainstem with no apparent damage to normal tissue.    Brainstem evoked potentials had remained normal.  Small bleeding points were   controlled with bipolar coagulation and Surgicel placed over the tumor bed.  The   area of the opening in the skull base tegmen was then repaired using a piece of   temporal muscle and DuraSeal fibrin glue.  The mastoid air cells were all   carefully waxed and Duragen placed over the construct.  The dura was then closed   with interrupted 4-0 Nurolon sutures.  The bone flaps were returned in place   using the Go[employer] Microplate System.  The temporalis muscle and fascia were   closed with 3-0 Vicryl sutures and the galea closed with inverted interrupted   3-0 Vicryl sutures and the skin closed with skin staples.  A Hemovac drain was   placed underneath the temporalis muscle and brought through a separate stab   incision posterior to the primary  incision.  The patient was then returned to   the supine position and his head released from 3-point skeletal fixation device.    He was allowed to awaken from anesthesia, extubated and left the Operating   Room in satisfactory condition.  He received 12 mg of Decadron and 2 g of   Rocephin at the beginning of the procedure and bacitracin-containing antibiotic   irrigating solutions were used throughout.      RDS/IN  dd: 02/12/2018 13:43:44 (CST)  td: 02/12/2018 14:44:02 (CST)  Doc ID   #[id number]  Job ID #[id number]    CC:

## 2018-02-14 ENCOUNTER — OFFICE VISIT (OUTPATIENT)
Dept: NEUROSURGERY | Facility: CLINIC | Age: 53
End: 2018-02-14
Payer: COMMERCIAL

## 2018-02-14 VITALS
HEART RATE: 69 BPM | TEMPERATURE: 99 F | WEIGHT: 214 LBS | DIASTOLIC BLOOD PRESSURE: 77 MMHG | HEIGHT: 66 IN | BODY MASS INDEX: 34.39 KG/M2 | SYSTOLIC BLOOD PRESSURE: 121 MMHG

## 2018-02-14 DIAGNOSIS — Q28.3 CEREBRAL CAVERNOUS MALFORMATION: Primary | ICD-10-CM

## 2018-02-14 DIAGNOSIS — Z98.890 STATUS POST CRANIOTOMY: ICD-10-CM

## 2018-02-14 DIAGNOSIS — I10 ESSENTIAL HYPERTENSION: ICD-10-CM

## 2018-02-14 DIAGNOSIS — Z29.89 SEIZURE PROPHYLAXIS: ICD-10-CM

## 2018-02-14 PROCEDURE — 99024 POSTOP FOLLOW-UP VISIT: CPT | Mod: S$GLB,,, | Performed by: PHYSICIAN ASSISTANT

## 2018-02-14 PROCEDURE — 99999 PR PBB SHADOW E&M-EST. PATIENT-LVL III: CPT | Mod: PBBFAC,,, | Performed by: PHYSICIAN ASSISTANT

## 2018-02-14 NOTE — LETTER
February 19, 2018      Otis Martinez MD  [address] [place] [id number]           [facility]  [address]  [facility] [address]  Phone: [phone]          Patient: Joel Deluca   MR Number: [id number]   YOB: 1965   Date of Visit: 2/14/2018       Dear Dr. Otis Martinez:    Thank you for referring Joel Deluca to me for evaluation. Attached you will find relevant portions of my assessment and plan of care.    If you have questions, please do not hesitate to call me. I look forward to following Joel Deluca along with you.    Sincerely,    DREW Foster    Enclosure  CC:  No Recipients    If you would like to receive this communication electronically, please contact [email or web address] or [phone] to request more information on [employer] Link access.    For providers and/or their staff who would like to refer a patient to [facility], please contact us through our one-stop-shop provider referral line, [facility], at [phone].    If you feel you have received this communication in error or would no longer like to receive these types of communications, please e-mail [email or web address]

## 2018-02-19 PROBLEM — Z98.890 STATUS POST CRANIOTOMY: Status: ACTIVE | Noted: 2018-02-19

## 2018-02-19 NOTE — PROGRESS NOTES
[facility] - Neurosurgery [facility]  Neurosurgery  History & Physical    Patient Name: Joel Deluca  MRN: [id number]  Primary Care Provider: Otis Martinez MD      Subjective:     Chief Complaint/Reason for Admission: 2 week FU s/p crani    History of Present Illness:   Joel Deluca is a 53 y.o. male with a PMHx of HTN and cervical disc disease, who presented to [facility] clinic with a left trochlear nerve palsy on 05/26/2017.  MRI showed a left-sided pontomesencephalic cavernous malformation at the junction of superior cerebellar peduncle and inferior colliculus, which appeared to have bled. This was followed overtime and increased in size over 6 months. On 1/31/18, he underwent a craniotomy for resection of the malformation. He presents to clinic today wife his wife, for a 2 week wound check. Wife is visibly upset, which she states is due to patient's non compliance. Patient states that since his surgery he has been doing well. He denies any headaches, dizziness, N/V, weakness, confusion, vision changes, or seizure like activity. He reports compliance with Decadron and Keppra. Upon further questioning, he reports that he is still having double vision when looking down. Denies any improvement or worsening of his vision. Patient states otherwise, he has no complaints. Wife, who is a pharmacist, states that patient has not been compliant with his medication. He took the Decadron sporadically for 1 week, then stopped taking it. He also intermittently takes his Keppra. Patient has also been outside working in the yard, lifting heavy objects. He has not been compliant with his post op restrictions per wife. Patient denies wife's statements and continues to voice his compliance.         (Not in a hospital admission)    Review of patient's allergies indicates:  No Known Allergies    Past Medical History:   Diagnosis Date    Cerebral cavernous malformation     Cervical disc disease with myelopathy     Hyperlipidemia      Hypertension      Past Surgical History:   Procedure Laterality Date    ANTERIOR CERVICAL DISCECTOMY W/ FUSION      BRAIN SURGERY       Family History     Problem Relation (Age of Onset)    Heart disease Father    Hypertension Mother, Father        Social History Main Topics    Smoking status: Never Smoker    Smokeless tobacco: Never Used    Alcohol use Yes    Drug use: No    Sexual activity: Yes     Partners: Female     Review of Systems  Objective:     Weight: 97.1 kg (214 lb)  Body mass index is 34.54 kg/m².  Vital Signs (Most Recent):  Temp: 99.1 °F (37.3 °C) (02/14/18 1325)  Pulse: 69 (02/14/18 1325)  BP: 121/77 (02/14/18 1325) Vital Signs (24h Range):  [unfilled]       Neurosurgery Physical Exam  General: well developed, well nourished, no distress.   Head: normocephalic  Neurologic: Alert, mild confusion. Patient repeatedly asks the same questions.  GCS: Motor: 6/Verbal: 4/Eyes: 4 GCS Total: 14  Mental Status: Awake, Alert, Oriented x 4, but delayed responses.   Language: No aphasia  Speech: No dysarthria. Some hesitancy in speech.   Cranial nerves: face symmetric, tongue midline. Continued CN IV palsy, otherwise CN II-XII grossly intact.   Eyes: pupils equal, round, reactive to light with accomodation, EOMI.  Pulmonary: normal respirations, no signs of respiratory distress  Abdomen: soft, non-distended, not tender to palpation  Sensory: intact to light touch throughout  Motor Strength:Moves all extremities spontaneously with good tone.  Full strength upper and lower extremities. No abnormal movements seen.   Pronator Drift: no drift noted  Finger-to-nose: Intact bilaterally  Clonus: absent  Babinski: absent  No LE edema  Skin: Skin is warm, dry and intact.  Gait: normal          Incision:  Clean, dry, well healed. No surrounding erythema or edema. No TTP.       Significant Diagnostics:  None    Assessment/Plan:     Assessment:  Joel Deluca is a 53 y.o. male with a PMHx of HTN and cervical disc  disease, who presented to [facility] clinic with a left trochlear nerve palsy on 05/26/2017. MRI showed a left-sided pontomesencephalic cavernous malformation which increased in size over 6 months. On 1/31/18, he underwent a craniotomy for resection of the malformation.    Plan:  -Patient neurologically stable on exam  -Incision has healed well. No need to continue using Bacitracin.   -OK to get incision wet. Do not submerge for 4 more weeks.   -Long discussion had with patient and wife about importance of compliance. Explained to patient why each medication has been ordered and why the post op restrictions are in place. Reviewed post op restrictions with patient. Patient voiced understanding.  -No driving until released by Dr. Molina  -No need to restart Decadron taper since patient stopped taking it over 1 week ago  -Continue Keppra BID for seizure prophylaxis  -FU with Dr. Molina in 2 weeks with head CT  -FU with Dr. Molina in 3 months with MRI with/without contrast.   -Encouraged patient and wife to call the clinic with any questions, concerns, or exam changes prior to follow up appt.     Discussed with DREW Glez  Neurosurgery  [facility] - Neurosurgery 7th Fl

## 2018-03-01 ENCOUNTER — OFFICE VISIT (OUTPATIENT)
Dept: NEUROSURGERY | Facility: CLINIC | Age: 53
End: 2018-03-01
Payer: COMMERCIAL

## 2018-03-01 ENCOUNTER — HOSPITAL ENCOUNTER (OUTPATIENT)
Dept: RADIOLOGY | Facility: HOSPITAL | Age: 53
Discharge: HOME | End: 2018-03-01
Attending: PHYSICIAN ASSISTANT
Payer: COMMERCIAL

## 2018-03-01 VITALS
HEIGHT: 66 IN | BODY MASS INDEX: 34.2 KG/M2 | HEART RATE: 78 BPM | DIASTOLIC BLOOD PRESSURE: 92 MMHG | WEIGHT: 212.81 LBS | TEMPERATURE: 98 F | SYSTOLIC BLOOD PRESSURE: 150 MMHG

## 2018-03-01 DIAGNOSIS — Z98.890 STATUS POST CRANIOTOMY: ICD-10-CM

## 2018-03-01 DIAGNOSIS — Q28.3 CAVERNOUS MALFORMATION: Primary | ICD-10-CM

## 2018-03-01 DIAGNOSIS — Z29.89 SEIZURE PROPHYLAXIS: ICD-10-CM

## 2018-03-01 DIAGNOSIS — I10 ESSENTIAL HYPERTENSION: ICD-10-CM

## 2018-03-01 DIAGNOSIS — Q28.3 CEREBRAL CAVERNOUS MALFORMATION: ICD-10-CM

## 2018-03-01 PROCEDURE — 99999 PR PBB SHADOW E&M-EST. PATIENT-LVL III: CPT | Mod: PBBFAC,,, | Performed by: NEUROLOGICAL SURGERY

## 2018-03-01 PROCEDURE — 70450 CT HEAD/BRAIN W/O DYE: CPT | Mod: 26,,, | Performed by: RADIOLOGY

## 2018-03-01 PROCEDURE — 70450 CT HEAD/BRAIN W/O DYE: CPT | Mod: TC

## 2018-03-01 PROCEDURE — 99024 POSTOP FOLLOW-UP VISIT: CPT | Mod: S$GLB,,, | Performed by: NEUROLOGICAL SURGERY

## 2018-03-01 NOTE — PROGRESS NOTES
"History & Physical      03/01/2018    Chief Complaint   Patient presents with    Follow-up     I, Linda Acevedo, madison that this documentation has been prepared under the direction and in the presence of Avery Mejia MD.    History of Present Illness:  Joel Deluca is a 53 y.o. 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  Patient presents for 6 weeks follow up evaluation.. Since last office visit, the patient has completed physical therapy and feels he no longer needs therapy. He denies any drooling, swallowing difficulty, or hearing difficulty. Patient is now able to walk at least 3.1 miles q day. Patients major complain is his double vision when looking down and to his right.     Interval History, dated 12/19/2017:  Joel Deluca is a 52 y.o. 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  Patient presents for follow up evaluation. Patient reports resolution of diplopia since his last visit. Patient continues to complain of sensation of "being wet" on the right side of his body. Patient now also complains of loss of balance when moving backwards, such as when climbing downwards from deer stands; requiring some time to steady himself before regaining balance.  He states that 2-3 weeks after our last visit, his speech had started to become altered as though "talking drunk." He also complains of difficulty with directing his left hand, such as missing when reaching out to grab a bucket.   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 He continues to deny any weakness, nausea, vomiting. He denies any drooling, swallowing difficulty, or hearing difficulty.      Interval History, dated 5/31/2017:   Joel Deluca is a 52 y.o. patient with history of a 4th nerve palsy.  Patient is referred to me by Dr. Otis Martinez.  Patient reports that two weeks ago he woke up with double vision and had a " "feeling of "being wet" on his right hemibody.  Patient denies weakness, speech problems, nausea, vomit, trouble with his gait and hearing loss. 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        Review of patient's allergies indicates:  No Known Allergies    Current Outpatient Prescriptions   Medication Sig Dispense Refill    atorvastatin (LIPITOR) 20 MG tablet Take 1 tablet (20 mg total) by mouth once daily. 30 tablet 5    bacitracin 500 unit/gram ointment Apply topically 2 (two) times daily.  0    dexamethasone (DECADRON) 2 MG tablet Take 2tab TID x 3d, then 1 tab QID x 3d, then 1 tab TID x 3d, then 1 tab BID x 3d, then 1 tab qd x 3d, then off. 50 tablet 0    famotidine (PEPCID) 20 MG tablet Take 1 tablet (20 mg total) by mouth 2 (two) times daily. 60 tablet 0    fenofibric acid (FIBRICOR) 135 mg CpDR Take 1 capsule (135 mg total) by mouth once daily. 30 capsule 5    levETIRAcetam (KEPPRA) 500 MG Tab Take 1 tablet (500 mg total) by mouth every 12 (twelve) hours. 60 tablet 1    propranolol (INDERAL LA) 80 MG 24 hr capsule Take 1 capsule (80 mg total) by mouth once daily. 30 capsule 5    valsartan (DIOVAN) 80 MG tablet Take 1 tablet (80 mg total) by mouth once daily. 30 tablet 5     No current facility-administered medications for this visit.        Past Medical History:   Diagnosis Date    Cerebral cavernous malformation     Cervical disc disease with myelopathy     Hyperlipidemia     Hypertension        Past Surgical History:   Procedure Laterality Date    ANTERIOR CERVICAL DISCECTOMY W/ FUSION      BRAIN SURGERY         Family History   Problem Relation Age of Onset    Hypertension Mother     Heart disease Father     Hypertension Father        Social History   Substance Use Topics    Smoking status: Never Smoker    Smokeless tobacco: Never Used    Alcohol use Yes        Review of Systems:  Review of Systems   Constitutional: Negative for appetite change.   HENT: Negative " "for congestion.    Eyes: Positive for visual disturbance. Negative for discharge.   Respiratory: Negative for apnea.    Cardiovascular: Negative for chest pain.   Gastrointestinal: Negative for abdominal distention.   Endocrine: Negative for cold intolerance.   Genitourinary: Negative for difficulty urinating.   Musculoskeletal: Negative for arthralgias.   Allergic/Immunologic: Negative for environmental allergies.   Neurological: Negative for dizziness, weakness and headaches.   Psychiatric/Behavioral: Negative for agitation.       Vital Signs (Most Recent)  Temp: 98.3 °F (36.8 °C) (03/01/18 1325)  Pulse: 78 (03/01/18 1325)  BP: (!) 150/92 (03/01/18 1325)  5' 6" (1.676 m)  96.5 kg (212 lb 12.8 oz)       Physical Exam:  Physical Exam:    Constitutional: He appears well-developed.     Eyes: Pupils are equal, round, and reactive to light. EOM are normal. Right eye exhibits no discharge. Left eye exhibits no discharge.     Abdominal: Soft.     Skin: Skin displays no rash on trunk and no rash on extremities.     Psych/Behavior: He is alert. He is oriented to person, place, and time.     Musculoskeletal: Gait is normal.        Neck: There is no tenderness.        Back: Range of motion is full.        Right Upper Extremities: Range of motion is full. Muscle strength is 5/5. Tone is normal.        Left Upper Extremities: Range of motion is full. Muscle strength is 5/5. Tone is normal.       Right Lower Extremities: Range of motion is full. Muscle strength is 5/5. Tone is normal.        Left Lower Extremities: Range of motion is full. Muscle strength is 5/5. Tone is normal.     Neurological:        Coordination: He has a normal Romberg Test and normal tandem walking coordination.        Sensory: There is no sensory deficit in the trunk. There is no sensory deficit in the extremities.        DTRs: DTRs are DTRS NORMAL AND SYMMETRICnormal and symmetric. Tricep reflexes are 2+ on the right side and 2+ on the left side. Bicep " reflexes are 2+ on the right side and 2+ on the left side. Brachioradialis reflexes are 2+ on the right side and 2+ on the left side. Patellar reflexes are 2+ on the right side and 2+ on the left side. Achilles reflexes are 2+ on the right side and 2+ on the left side. He displays no Babinski's sign on the right side. He displays no Babinski's sign on the left side.        Cranial nerves: Cranial nerve(s) II, III, IV, V, VI, VII, VIII, IX, X, XI and XII are intact.       No pronator drift.   5/5 strength throughout.   Decreased hearing acuity on the left ear.  Sensation is intact.   Wound is well healed.    Laboratory  None    Diagnostic Results:  CT: Reviewed     CT Head Without Contrast, dated 3/1/2018,  I have personally reviewed images and explained them to the patient.   Postoperative changes status post resection of cavernoma in the left dorsal midbrain with no acute intra-axial abnormality.    Craniotomy defect approaches the superior margin of the mastoid air cells with increasing fluid opacification of the mastoid air cells.  This may be postoperative.  Continued followup recommended.    ASSESSMENT/PLAN:       ICD-10-CM ICD-9-CM   1. Cavernous malformation Q28.3 228.02   2. Status post craniotomy Z98.890 V45.89       PLAN:  1. S/p subtemporal approach for brainstem cavernoma resection. Patient is 6 week Post Op, and he is doing great.     2. At this point, he has only mild left-sided cranial nerve 4th palsy, with minimal double vision. He does not have any other major complaints. I think he will keep improving till this deficit resolved.     3. At this point, we will stop his Keppra, and he is okay to resume his other activities including driving.     4. RTC in 12 weeks with MRI Brain without contrast to evaluate complete VS incomplete resection of the cavernoma.       Joel was seen today for follow-up.    Diagnoses and all orders for this visit:    Cavernous malformation    Status post craniotomy      I,  Dr. Avery Mejia, personally performed the services described in this documentation. All medical record entries made by the scribe were at my direction and in my presence.  I have reviewed the chart and agree that the record reflects my personal performance and is accurate and complete. Avery Mejia MD.  5:51 PM 03/01/2018

## 2018-05-11 ENCOUNTER — TELEPHONE (OUTPATIENT)
Dept: NEUROSURGERY | Facility: CLINIC | Age: 53
End: 2018-05-11

## 2018-05-11 DIAGNOSIS — Q28.3 CEREBRAL CAVERNOUS MALFORMATION: Primary | ICD-10-CM

## 2018-05-24 ENCOUNTER — OFFICE VISIT (OUTPATIENT)
Dept: NEUROSURGERY | Facility: CLINIC | Age: 53
End: 2018-05-24
Payer: COMMERCIAL

## 2018-05-24 ENCOUNTER — HOSPITAL ENCOUNTER (OUTPATIENT)
Dept: RADIOLOGY | Facility: HOSPITAL | Age: 53
Discharge: HOME | End: 2018-05-24
Attending: NEUROLOGICAL SURGERY
Payer: COMMERCIAL

## 2018-05-24 VITALS
HEART RATE: 74 BPM | DIASTOLIC BLOOD PRESSURE: 89 MMHG | HEIGHT: 66 IN | BODY MASS INDEX: 35.07 KG/M2 | WEIGHT: 218.19 LBS | TEMPERATURE: 98 F | SYSTOLIC BLOOD PRESSURE: 143 MMHG

## 2018-05-24 DIAGNOSIS — Q28.3 CAVERNOUS MALFORMATION: Primary | ICD-10-CM

## 2018-05-24 DIAGNOSIS — Z98.890 STATUS POST CRANIOTOMY: ICD-10-CM

## 2018-05-24 DIAGNOSIS — Q28.3 CEREBRAL CAVERNOUS MALFORMATION: ICD-10-CM

## 2018-05-24 DIAGNOSIS — I61.3 LEFT-SIDED NONTRAUMATIC INTRACEREBRAL HEMORRHAGE OF BRAINSTEM: ICD-10-CM

## 2018-05-24 PROCEDURE — A9585 GADOBUTROL INJECTION: HCPCS | Performed by: NEUROLOGICAL SURGERY

## 2018-05-24 PROCEDURE — 70553 MRI BRAIN STEM W/O & W/DYE: CPT | Mod: TC

## 2018-05-24 PROCEDURE — 25500020 PHARM REV CODE 255: Performed by: NEUROLOGICAL SURGERY

## 2018-05-24 PROCEDURE — 99214 OFFICE O/P EST MOD 30 MIN: CPT | Mod: S$GLB,,, | Performed by: NEUROLOGICAL SURGERY

## 2018-05-24 PROCEDURE — 99999 PR PBB SHADOW E&M-EST. PATIENT-LVL III: CPT | Mod: PBBFAC,,, | Performed by: NEUROLOGICAL SURGERY

## 2018-05-24 PROCEDURE — 70553 MRI BRAIN STEM W/O & W/DYE: CPT | Mod: 26,,, | Performed by: RADIOLOGY

## 2018-05-24 RX ORDER — GADOBUTROL 604.72 MG/ML
10 INJECTION INTRAVENOUS
Status: COMPLETED | OUTPATIENT
Start: 2018-05-24 | End: 2018-05-24

## 2018-05-24 RX ADMIN — GADOBUTROL 10 ML: 604.72 INJECTION INTRAVENOUS at 09:05

## 2018-05-24 NOTE — PROGRESS NOTES
History & Physical    I, Aram Ortiz, attest that this documentation has been prepared under the direction and in the presence of Avery Mejia MD.    05/24/2018    No chief complaint on file.      History of Present Illness:  Joel Deulca is a 53 y.o. patient with history of 4th nerve palsy and brainstem/kim cavernous malformation with multiple episodes of hemorrhage and growth. Patient presents for follow up evaluation from his craniotomy for cavernoma resection on 1/31/18.     Patient is doing well. Patient states that he is functioning independently at this point, and he is back to work. Patient continues to complain of diplopia when looking to the right and down. Patient reports that his diplopia is slowly improving from surgery. Patient also complains of fine motor skill difficulty of his left hand such as with flipping through his checkbook with his left hand.     Patient also has a remote history of cervical herniated disc. At the time he initially presented with the brain stem hemorrhage and right-sided numbness in his body, he was also diagnosed with a herniated disc of the cervical spine in an outside hospital.      Interval History, dated 3/01/2018:  Joel Deluca is a 53 y.o. 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  Patient presents for 6 weeks follow up evaluation.. Since last office visit, the patient has completed physical therapy and feels he no longer needs therapy. He denies any drooling, swallowing difficulty, or hearing difficulty. Patient is now able to walk at least 3.1 miles q day. Patients major complain is his double vision when looking down and to his right.      Interval History, dated 12/19/2017:  Joel Deluca is a 52 y.o. 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  Patient presents for follow up evaluation. Patient reports resolution of diplopia since his last visit. Patient continues to complain  "of sensation of "being wet" on the right side of his body. Patient now also complains of loss of balance when moving backwards, such as when climbing downwards from deer stands; requiring some time to steady himself before regaining balance.  He states that 2-3 weeks after our last visit, his speech had started to become altered as though "talking drunk." He also complains of difficulty with directing his left hand, such as missing when reaching out to grab a bucket.   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 He continues to deny any weakness, nausea, vomiting. He denies any drooling, swallowing difficulty, or hearing difficulty.      Interval History, dated 5/31/2017:   Joel Deluca is a 52 y.o. patient with history of a 4th nerve palsy.  Patient is referred to me by Dr. Otis Martinez.  Patient reports that two weeks ago he woke up with double vision and had a feeling of "being wet" on his right hemibody.  Patient denies weakness, speech problems, nausea, vomit, trouble with his gait and hearing loss. 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       Review of patient's allergies indicates:  No Known Allergies    Current Outpatient Prescriptions   Medication Sig Dispense Refill    atorvastatin (LIPITOR) 20 MG tablet Take 1 tablet (20 mg total) by mouth once daily. 30 tablet 5    fenofibric acid (FIBRICOR) 135 mg CpDR Take 1 capsule (135 mg total) by mouth once daily. 30 capsule 5    propranolol (INDERAL LA) 80 MG 24 hr capsule Take 1 capsule (80 mg total) by mouth once daily. 30 capsule 5    valsartan (DIOVAN) 80 MG tablet Take 1 tablet (80 mg total) by mouth once daily. 30 tablet 5    bacitracin 500 unit/gram ointment Apply topically 2 (two) times daily.  0    dexamethasone (DECADRON) 2 MG tablet Take 2tab TID x 3d, then 1 tab QID x 3d, then 1 tab TID x 3d, then 1 tab BID x 3d, then 1 tab qd x 3d, then off. 50 tablet 0    famotidine (PEPCID) " "20 MG tablet Take 1 tablet (20 mg total) by mouth 2 (two) times daily. 60 tablet 0    levETIRAcetam (KEPPRA) 500 MG Tab Take 1 tablet (500 mg total) by mouth every 12 (twelve) hours. 60 tablet 1     No current facility-administered medications for this visit.        Past Medical History:   Diagnosis Date    Cerebral cavernous malformation     Cervical disc disease with myelopathy     Hyperlipidemia     Hypertension        Past Surgical History:   Procedure Laterality Date    ANTERIOR CERVICAL DISCECTOMY W/ FUSION      BRAIN SURGERY         Family History   Problem Relation Age of Onset    Hypertension Mother     Heart disease Father     Hypertension Father        Social History   Substance Use Topics    Smoking status: Never Smoker    Smokeless tobacco: Never Used    Alcohol use Yes        Review of Systems:  Review of Systems   Constitutional: Negative for appetite change.   HENT: Negative for congestion.    Eyes: Positive for visual disturbance. Negative for discharge.   Respiratory: Negative for apnea.    Cardiovascular: Negative for chest pain.   Gastrointestinal: Negative for abdominal distention.   Endocrine: Negative for cold intolerance.   Genitourinary: Negative for difficulty urinating.   Musculoskeletal: Negative for arthralgias.   Allergic/Immunologic: Negative for environmental allergies.   Neurological: Negative for dizziness, weakness and headaches.   Psychiatric/Behavioral: Negative for agitation.       Vital Signs (Most Recent)  Temp: 98 °F (36.7 °C) (05/24/18 0926)  Pulse: 74 (05/24/18 0926)  BP: (!) 143/89 (05/24/18 0926)  5' 6" (1.676 m)  99 kg (218 lb 3.2 oz)       Physical Exam:  Physical Exam:    Constitutional: He appears well-developed.     Eyes: Pupils are equal, round, and reactive to light. EOM are normal. Right eye exhibits no discharge. Left eye exhibits no discharge.     Abdominal: Soft.     Skin: Skin displays no rash on trunk and no rash on extremities. "     Psych/Behavior: He is alert. He is oriented to person, place, and time.     Musculoskeletal: Gait is normal.        Neck: There is no tenderness.        Back: Range of motion is full.        Right Upper Extremities: Range of motion is full. Muscle strength is 5/5. Tone is normal.        Left Upper Extremities: Range of motion is full. Muscle strength is 5/5. Tone is normal.       Right Lower Extremities: Range of motion is full. Muscle strength is 5/5. Tone is normal.        Left Lower Extremities: Range of motion is full. Muscle strength is 5/5. Tone is normal.     Neurological:        Coordination: He has a normal Romberg Test and normal tandem walking coordination.        Sensory: There is no sensory deficit in the trunk. There is no sensory deficit in the extremities.        DTRs: DTRs are DTRS NORMAL AND SYMMETRICnormal and symmetric. Tricep reflexes are 2+ on the right side and 2+ on the left side. Bicep reflexes are 2+ on the right side and 2+ on the left side. Brachioradialis reflexes are 2+ on the right side and 2+ on the left side. Patellar reflexes are 2+ on the right side and 2+ on the left side. Achilles reflexes are 2+ on the right side and 2+ on the left side. He displays no Babinski's sign on the right side. He displays no Babinski's sign on the left side.        Cranial nerves: Cranial nerve(s) II, III, V, VI, VII, VIII, IX, X, XI and XII are intact.       Left-sided 4th nerve palsy.   Decreased amplitude and frequency to finger tap on the left hand.   Negative Castañeda's.     Laboratory  BMP: Reviewed  CMP: Reviewed    Diagnostic Results:  MRI: Reviewed  MRI Brain W WO Contrast, dated 5/24/2018: Reviewed personally and explained them to the patient.   MRI shows complete removal of brain stem cavernoma. There is residual blood products in the area of the resection cavity.     ASSESSMENT/PLAN:       ICD-10-CM ICD-9-CM   1. Cavernous malformation Q28.3 228.02   2. Cerebral cavernous malformation  Q28.3 228.02   3. Status post craniotomy Z98.890 V45.89   4. 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 S/p craniotomy for brainstem cavernous malformation removal on 1/31/18. Patient is doing well, with residual mild 4th nerve palsy. I will refer him to Dr. Nazario for evaluation of treatment of his partial 4th nerve palsy.     2. I will refer him to physical therapy for evaluation / treatment of his decreased left upper extremity fine motor skills.     3. I will get MRI of the cervical spine without contrast to evaluate his previous cervical disc pathology.     4. RTC 3 months.     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 Avery Mejia, personally performed the services described in this documentation. All medical record entries made by the scribe were at my direction and in my presence.  I have reviewed the chart and agree that the record reflects my personal performance and is accurate and complete. Avery Mejia MD.  12:59 PM 05/24/2018

## 2018-05-28 ENCOUNTER — TELEPHONE (OUTPATIENT)
Dept: NEUROSURGERY | Facility: CLINIC | Age: 53
End: 2018-05-28

## 2018-05-28 DIAGNOSIS — H49.10 SUPERIOR OBLIQUE PALSY, UNSPECIFIED LATERALITY: Primary | ICD-10-CM

## 2018-05-28 DIAGNOSIS — M50.00 HNP (HERNIATED NUCLEUS PULPOSUS) WITH MYELOPATHY, CERVICAL: ICD-10-CM

## 2018-06-01 ENCOUNTER — INITIAL CONSULT (OUTPATIENT)
Dept: OPHTHALMOLOGY | Facility: CLINIC | Age: 53
End: 2018-06-01
Payer: COMMERCIAL

## 2018-06-01 ENCOUNTER — CLINICAL SUPPORT (OUTPATIENT)
Dept: OPHTHALMOLOGY | Facility: CLINIC | Age: 53
End: 2018-06-01
Payer: COMMERCIAL

## 2018-06-01 ENCOUNTER — HOSPITAL ENCOUNTER (OUTPATIENT)
Dept: RADIOLOGY | Facility: HOSPITAL | Age: 53
Discharge: HOME | End: 2018-06-01
Attending: NEUROLOGICAL SURGERY
Payer: COMMERCIAL

## 2018-06-01 DIAGNOSIS — H49.10 SUPERIOR OBLIQUE PALSY, UNSPECIFIED LATERALITY: ICD-10-CM

## 2018-06-01 DIAGNOSIS — M50.00 HNP (HERNIATED NUCLEUS PULPOSUS) WITH MYELOPATHY, CERVICAL: ICD-10-CM

## 2018-06-01 DIAGNOSIS — H50.22 HYPERTROPIA OF LEFT EYE: ICD-10-CM

## 2018-06-01 DIAGNOSIS — I61.3 LEFT-SIDED NONTRAUMATIC INTRACEREBRAL HEMORRHAGE OF BRAINSTEM: ICD-10-CM

## 2018-06-01 DIAGNOSIS — H49.12 LEFT-SIDED FOURTH CRANIAL NERVE PALSY: ICD-10-CM

## 2018-06-01 DIAGNOSIS — Q28.3 CAVERNOUS MALFORMATION: Primary | ICD-10-CM

## 2018-06-01 PROCEDURE — 99999 PR PBB SHADOW E&M-EST. PATIENT-LVL II: CPT | Mod: PBBFAC,,, | Performed by: OPHTHALMOLOGY

## 2018-06-01 PROCEDURE — 92004 COMPRE OPH EXAM NEW PT 1/>: CPT | Mod: S$GLB,,, | Performed by: OPHTHALMOLOGY

## 2018-06-01 PROCEDURE — 72141 MRI NECK SPINE W/O DYE: CPT | Mod: 26,,, | Performed by: RADIOLOGY

## 2018-06-01 PROCEDURE — 92083 EXTENDED VISUAL FIELD XM: CPT | Mod: S$GLB,,, | Performed by: OPHTHALMOLOGY

## 2018-06-01 PROCEDURE — 72141 MRI NECK SPINE W/O DYE: CPT | Mod: TC

## 2018-06-01 NOTE — LETTER
June 1, 2018      Avery Mejia MD  [address]  [place] [address]           [facility] - Ophthalmology  [address]  [facility] [facility] [place] [address]  Phone: [phone]  Fax: [phone]          Patient: Joel Deluca   MR Number: [id number]   YOB: 1965   Date of Visit: 6/1/2018       Dear Dr. Avery Mejia:    Thank you for referring Joel Deluca to me for evaluation. Attached you will find relevant portions of my assessment and plan of care.    If you have questions, please do not hesitate to call me. I look forward to following Joel Deluca along with you.    Sincerely,    Otoniel Nazario MD    Enclosure  CC:  No Recipients    If you would like to receive this communication electronically, please contact [email or web address] or [phone] to request more information on [facility] Link access.    For providers and/or their staff who would like to refer a patient to [facility], please contact us through our one-stop-shop provider referral line, [facility], at [phone].    If you feel you have received this communication in error or would no longer like to receive these types of communications, please e-mail [email or web address]

## 2018-06-01 NOTE — PROGRESS NOTES
HPI     Concerns About Ocular Health    Additional comments: partial 4th nerve palsy           Comments   Referred by Dr.Edison Mejia  Pt states had diplopia since 05/2017 which seem to have improved over the   months.  When he had the brain surg(01/31/2018) he notice the double vision has   came back.  Notice the double(top and bottom) when he looks to the right.  No eye pain.       Last edited by Virginia Ron MA on 6/1/2018  2:15 PM. (History)            Assessment /Plan     For exam results, see Encounter Report.    Left-sided fourth cranial nerve palsy    Hypertropia of left eye      I was unable to correct Mr. Deluca's excyclotorsion with prisms. He feels he is functional without prism, however. I expect he will recover fully within the next couple of months. I will repeat his exam in two months if his diplopia persists.

## 2018-08-01 ENCOUNTER — OFFICE VISIT (OUTPATIENT)
Dept: FAMILY MEDICINE | Facility: CLINIC | Age: 53
End: 2018-08-01
Payer: COMMERCIAL

## 2018-08-01 VITALS
HEIGHT: 66 IN | RESPIRATION RATE: 18 BRPM | BODY MASS INDEX: 34.98 KG/M2 | HEART RATE: 68 BPM | SYSTOLIC BLOOD PRESSURE: 128 MMHG | DIASTOLIC BLOOD PRESSURE: 84 MMHG | WEIGHT: 217.63 LBS

## 2018-08-01 DIAGNOSIS — E78.5 HYPERLIPIDEMIA, UNSPECIFIED HYPERLIPIDEMIA TYPE: ICD-10-CM

## 2018-08-01 DIAGNOSIS — G47.19 EXCESSIVE DAYTIME SLEEPINESS: ICD-10-CM

## 2018-08-01 DIAGNOSIS — I10 ESSENTIAL HYPERTENSION: ICD-10-CM

## 2018-08-01 DIAGNOSIS — R06.83 SNORING: Primary | ICD-10-CM

## 2018-08-01 LAB
ALT SERPL W/O P-5'-P-CCNC: 38 U/L
ANION GAP SERPL CALC-SCNC: 11 MMOL/L
BUN SERPL-MCNC: 18 MG/DL
CALCIUM SERPL-MCNC: 10.2 MG/DL
CHLORIDE SERPL-SCNC: 105 MMOL/L
CHOLEST SERPL-MCNC: 213 MG/DL
CHOLEST/HDLC SERPL: 7.6 {RATIO}
CO2 SERPL-SCNC: 24 MMOL/L
CREAT SERPL-MCNC: 1.1 MG/DL
EST. GFR  (AFRICAN AMERICAN): >60 ML/MIN/1.73 M^2
EST. GFR  (NON AFRICAN AMERICAN): >60 ML/MIN/1.73 M^2
GLUCOSE SERPL-MCNC: 82 MG/DL
HDLC SERPL-MCNC: 28 MG/DL
HDLC SERPL: 13.1 %
LDLC SERPL CALC-MCNC: 125 MG/DL
NONHDLC SERPL-MCNC: 185 MG/DL
POTASSIUM SERPL-SCNC: 3.6 MMOL/L
SODIUM SERPL-SCNC: 140 MMOL/L
TRIGL SERPL-MCNC: 300 MG/DL

## 2018-08-01 PROCEDURE — 80048 BASIC METABOLIC PNL TOTAL CA: CPT

## 2018-08-01 PROCEDURE — 80061 LIPID PANEL: CPT

## 2018-08-01 PROCEDURE — 99214 OFFICE O/P EST MOD 30 MIN: CPT | Mod: S$GLB,,, | Performed by: FAMILY MEDICINE

## 2018-08-01 PROCEDURE — 99999 PR PBB SHADOW E&M-EST. PATIENT-LVL III: CPT | Mod: PBBFAC,,, | Performed by: FAMILY MEDICINE

## 2018-08-01 PROCEDURE — 36415 COLL VENOUS BLD VENIPUNCTURE: CPT | Mod: S$GLB,,, | Performed by: FAMILY MEDICINE

## 2018-08-01 PROCEDURE — 84460 ALANINE AMINO (ALT) (SGPT): CPT

## 2018-08-01 RX ORDER — FENOFIBRIC ACID 135 MG/1
1 CAPSULE, DELAYED RELEASE ORAL DAILY
Qty: 30 CAPSULE | Refills: 5 | Status: SHIPPED | OUTPATIENT
Start: 2018-08-01 | End: 2019-02-16 | Stop reason: SDUPTHER

## 2018-08-01 RX ORDER — PROPRANOLOL HYDROCHLORIDE 80 MG/1
80 CAPSULE, EXTENDED RELEASE ORAL DAILY
Qty: 30 CAPSULE | Refills: 5 | Status: SHIPPED | OUTPATIENT
Start: 2018-08-01 | End: 2019-02-16 | Stop reason: SDUPTHER

## 2018-08-01 RX ORDER — VALSARTAN 80 MG/1
80 TABLET ORAL DAILY
Qty: 30 TABLET | Refills: 5 | Status: SHIPPED | OUTPATIENT
Start: 2018-08-01 | End: 2018-11-23 | Stop reason: SDUPTHER

## 2018-08-01 RX ORDER — ATORVASTATIN CALCIUM 20 MG/1
20 TABLET, FILM COATED ORAL DAILY
Qty: 30 TABLET | Refills: 5 | Status: SHIPPED | OUTPATIENT
Start: 2018-08-01 | End: 2019-02-16 | Stop reason: SDUPTHER

## 2018-08-01 NOTE — PROGRESS NOTES
Pt is a 53 y.o. male who presents for check up for   Encounter Diagnoses   Name Primary?    Hyperlipidemia     Hypertension    . Doing well on current meds. Denies any side effects. Prevention is up to date.  He refuses the flu shot    HPI: White male here for checkup today. His blood pressure is running well. He denies side effects such as dry cough, lightheadedness, fatigue. His cholesterol medications were well tolerated. He does not have any side effects such as myalgias. He denies fever chills weight loss or weight gain. He denies chest pain.  Patient recently had blood work done through work.  Lab results were reviewed.  This will be scanned into the computer.  Having more neck pain.  History of cervical fusion.  Gets paresthesia in right hand. pain comes and goes.  Very painful when he lies down.  He is tolerating it and does not want anything done at this time.    He was diagnosed with a cavernous hemangioma in the brain.  S/P resection last week.  Doing remarkably well.  His speech has improved nicely.  He is no longer having double vision.  He is starting to play golf.  He is having loud snoring and daytime sleepiness.  Has fitful sleep.    ROS:   Gen.: No fever, chills, weight loss, weight gain  HEENT: No headaches, sinus congestion, sore throat, change in vision  CV: No chest pain  RESP: No shortness of breath, wheezing, cough  GI: No change in bowel habits, no diarrhea, no abdominal pain, no hematochezia  : No dysuria, frequency  MSK: Occasional left hip pain  NEURO: No numbness, weakness  ENDO: No polyuria, polydipsia, heat or cold intolerance    PMH, PSH, ALLERGIES, SH, FH reviewed in office note on 4/10/11  Medications reconciled in the nurse's notes    PHYSICAL EXAM;   Vitals:    08/01/18 1225   BP: 128/84   Pulse: 68   Resp: 18     APPEARANCE: Well nourished, well developed, in no acute distress.   HEAD: Normocephalic, atraumatic.  EYES: PERRL. EOMI.   EARS: TM's intact. Light reflex normal. No  retraction or perforation.  Very hard of hearing   NOSE: Mucosa pink. Airway clear.  MOUTH & THROAT: No tonsillar enlargement. No pharyngeal erythema or exudate. No stridor.  NECK: Supple.  No carotid bruits.  No JVD   CHEST: Lungs clear to auscultation.  CARDIOVASCULAR: Normal S1, S2. No rubs, murmurs or gallops.  MUSCULOSKELETAL: No clubbing cyanosis or edema  SKIN: No rashes or pigmented moles.  NEUROLOGIC:    Cranial Nerves: III-XII grossly intact. still suffers with double vision    Motor: No focal deficits   MENTAL STATUS: Normal orientation to person, place and time, normal affect, normal flow thought, normal memory.    Lab Results   Component Value Date    LDLCALC 136.0 01/06/2017   Trig 339  HDL 27  LDLc 119    ASSESSMENT/PLAN:    HTN -   Encouraged patient to avoid table salt and try to follow a 2 g sodium diet  Continue propranolol 80 mg daily, Diovan 80 mg p.o. daily.  Walk 20 minutes per day.  Try to lose weight.    Dyslipidemia-  Low fat diet. Avoid sweets. A 10 pound weight loss by the next visit as a  good goal. Increase consumption of fruits and vegetables, fish and chicken.  Continue Lipitor 20 mg daily, Trilipix 135 mg p.o. daily.    Essential hypertension  -     valsartan (DIOVAN) 80 MG tablet; Take 1 tablet (80 mg total) by mouth once daily.  Dispense: 30 tablet; Refill: 5  -     propranolol (INDERAL LA) 80 MG 24 hr capsule; Take 1 capsule (80 mg total) by mouth once daily.  Dispense: 30 capsule; Refill: 5    S/P resection of Cerebral cavernous malformation  Keep appointment with neurosurgery    Cervical disc disease with myelopathy  Follow-up with neurosurgery for further evaluation    Snoring  -     Polysomnogram (CPAP will be added if patient meets diagnostic criteria.); Future    Hyperlipidemia, unspecified hyperlipidemia type  -     atorvastatin (LIPITOR) 20 MG tablet; Take 1 tablet (20 mg total) by mouth once daily.  Dispense: 30 tablet; Refill: 5  -     fenofibric acid (FIBRICOR) 135 mg  CpDR; Take 1 capsule (135 mg total) by mouth once daily.  Dispense: 30 capsule; Refill: 5  -     ALT (SGPT); Future  -     Basic metabolic panel; Future  -     Lipid panel; Future    Essential hypertension  -     propranolol (INDERAL LA) 80 MG 24 hr capsule; Take 1 capsule (80 mg total) by mouth once daily.  Dispense: 30 capsule; Refill: 5  -     valsartan (DIOVAN) 80 MG tablet; Take 1 tablet (80 mg total) by mouth once daily.  Dispense: 30 tablet; Refill: 5    Excessive daytime sleepiness  -     Polysomnogram (CPAP will be added if patient meets diagnostic criteria.); Future    He refuses colonoscopy and heme occult test at this time    Next appointment will be in 6 months.

## 2018-08-02 ENCOUNTER — TELEPHONE (OUTPATIENT)
Dept: FAMILY MEDICINE | Facility: CLINIC | Age: 53
End: 2018-08-02

## 2018-08-02 NOTE — TELEPHONE ENCOUNTER
----- Message from Madeleine Riggs sent at 2018 11:23 AM CDT -----  Contact: Madeleine- Sleep lab @ [facility]  Joel Deluca  MRN: [id number]  : 1965  PCP: Otis Martinez  Home Phone      [phone]  Work Phone      Not on file.  Mobile          [phone]      MESSAGE:   Madeleine states that she needs latest appointment notes signed by the doctor. Please advise.    Phone:  [phone]

## 2018-08-10 ENCOUNTER — HOSPITAL ENCOUNTER (OUTPATIENT)
Dept: SLEEP MEDICINE | Facility: HOSPITAL | Age: 53
Discharge: HOME | End: 2018-08-10
Attending: FAMILY MEDICINE
Payer: COMMERCIAL

## 2018-08-10 DIAGNOSIS — G47.33 OBSTRUCTIVE SLEEP APNEA (ADULT) (PEDIATRIC): ICD-10-CM

## 2018-08-10 PROCEDURE — 95810 POLYSOM 6/> YRS 4/> PARAM: CPT | Mod: 26,,, | Performed by: INTERNAL MEDICINE

## 2018-08-10 PROCEDURE — 95810 POLYSOM 6/> YRS 4/> PARAM: CPT

## 2018-08-23 ENCOUNTER — HOSPITAL ENCOUNTER (OUTPATIENT)
Dept: SLEEP MEDICINE | Facility: HOSPITAL | Age: 53
Discharge: HOME | End: 2018-08-23
Attending: FAMILY MEDICINE
Payer: COMMERCIAL

## 2018-08-23 DIAGNOSIS — G47.33 OBSTRUCTIVE SLEEP APNEA (ADULT) (PEDIATRIC): ICD-10-CM

## 2018-08-23 PROCEDURE — 95811 POLYSOM 6/>YRS CPAP 4/> PARM: CPT

## 2018-08-31 ENCOUNTER — TELEPHONE (OUTPATIENT)
Dept: FAMILY MEDICINE | Facility: CLINIC | Age: 53
End: 2018-08-31

## 2018-08-31 ENCOUNTER — HOSPITAL ENCOUNTER (EMERGENCY)
Facility: HOSPITAL | Age: 53
Discharge: HOME | End: 2018-08-31
Attending: EMERGENCY MEDICINE
Payer: COMMERCIAL

## 2018-08-31 VITALS
RESPIRATION RATE: 18 BRPM | OXYGEN SATURATION: 97 % | TEMPERATURE: 97 F | HEART RATE: 68 BPM | SYSTOLIC BLOOD PRESSURE: 142 MMHG | DIASTOLIC BLOOD PRESSURE: 84 MMHG

## 2018-08-31 DIAGNOSIS — N20.0 NEPHROLITHIASIS: Primary | ICD-10-CM

## 2018-08-31 LAB
ALBUMIN SERPL BCP-MCNC: 4.7 G/DL
ALP SERPL-CCNC: 67 U/L
ALT SERPL W/O P-5'-P-CCNC: 43 U/L
AMORPH CRY URNS QL MICRO: ABNORMAL
ANION GAP SERPL CALC-SCNC: 12 MMOL/L
AST SERPL-CCNC: 32 U/L
BACTERIA #/AREA URNS HPF: ABNORMAL /HPF
BASOPHILS # BLD AUTO: 0.06 K/UL
BASOPHILS NFR BLD: 0.7 %
BILIRUB SERPL-MCNC: 0.3 MG/DL
BILIRUB UR QL STRIP: NEGATIVE
BUN SERPL-MCNC: 20 MG/DL
CALCIUM SERPL-MCNC: 10.2 MG/DL
CHLORIDE SERPL-SCNC: 102 MMOL/L
CLARITY UR: ABNORMAL
CO2 SERPL-SCNC: 26 MMOL/L
COLOR UR: YELLOW
CREAT SERPL-MCNC: 1.3 MG/DL
DIFFERENTIAL METHOD: ABNORMAL
EOSINOPHIL # BLD AUTO: 0.2 K/UL
EOSINOPHIL NFR BLD: 2 %
ERYTHROCYTE [DISTWIDTH] IN BLOOD BY AUTOMATED COUNT: 13.1 %
EST. GFR  (AFRICAN AMERICAN): >60 ML/MIN/1.73 M^2
EST. GFR  (NON AFRICAN AMERICAN): >60 ML/MIN/1.73 M^2
GLUCOSE SERPL-MCNC: 137 MG/DL
GLUCOSE UR QL STRIP: NEGATIVE
HCT VFR BLD AUTO: 41.6 %
HGB BLD-MCNC: 14.3 G/DL
HGB UR QL STRIP: ABNORMAL
KETONES UR QL STRIP: NEGATIVE
LEUKOCYTE ESTERASE UR QL STRIP: NEGATIVE
LIPASE SERPL-CCNC: 44 U/L
LYMPHOCYTES # BLD AUTO: 2.2 K/UL
LYMPHOCYTES NFR BLD: 24.6 %
MCH RBC QN AUTO: 30.1 PG
MCHC RBC AUTO-ENTMCNC: 34.4 G/DL
MCV RBC AUTO: 88 FL
MICROSCOPIC COMMENT: ABNORMAL
MONOCYTES # BLD AUTO: 1 K/UL
MONOCYTES NFR BLD: 10.8 %
NEUTROPHILS # BLD AUTO: 5.6 K/UL
NEUTROPHILS NFR BLD: 61.9 %
NITRITE UR QL STRIP: NEGATIVE
PH UR STRIP: 8 [PH] (ref 5–8)
PLATELET # BLD AUTO: 384 K/UL
PMV BLD AUTO: 8.5 FL
POTASSIUM SERPL-SCNC: 4 MMOL/L
PROT SERPL-MCNC: 8.4 G/DL
PROT UR QL STRIP: NEGATIVE
RBC # BLD AUTO: 4.75 M/UL
RBC #/AREA URNS HPF: >100 /HPF (ref 0–4)
SODIUM SERPL-SCNC: 140 MMOL/L
SP GR UR STRIP: 1.02 (ref 1–1.03)
URN SPEC COLLECT METH UR: ABNORMAL
UROBILINOGEN UR STRIP-ACNC: NEGATIVE EU/DL
WBC # BLD AUTO: 9.1 K/UL

## 2018-08-31 PROCEDURE — 83690 ASSAY OF LIPASE: CPT

## 2018-08-31 PROCEDURE — 36415 COLL VENOUS BLD VENIPUNCTURE: CPT

## 2018-08-31 PROCEDURE — 96365 THER/PROPH/DIAG IV INF INIT: CPT

## 2018-08-31 PROCEDURE — 81000 URINALYSIS NONAUTO W/SCOPE: CPT

## 2018-08-31 PROCEDURE — 85025 COMPLETE CBC W/AUTO DIFF WBC: CPT

## 2018-08-31 PROCEDURE — 96375 TX/PRO/DX INJ NEW DRUG ADDON: CPT

## 2018-08-31 PROCEDURE — 96376 TX/PRO/DX INJ SAME DRUG ADON: CPT

## 2018-08-31 PROCEDURE — 63600175 PHARM REV CODE 636 W HCPCS: Performed by: EMERGENCY MEDICINE

## 2018-08-31 PROCEDURE — 99284 EMERGENCY DEPT VISIT MOD MDM: CPT | Mod: 25

## 2018-08-31 PROCEDURE — 80053 COMPREHEN METABOLIC PANEL: CPT

## 2018-08-31 PROCEDURE — 96361 HYDRATE IV INFUSION ADD-ON: CPT

## 2018-08-31 PROCEDURE — 25000003 PHARM REV CODE 250: Performed by: EMERGENCY MEDICINE

## 2018-08-31 RX ORDER — ONDANSETRON 4 MG/1
4 TABLET, FILM COATED ORAL EVERY 6 HOURS
Qty: 12 TABLET | Refills: 0 | Status: SHIPPED | OUTPATIENT
Start: 2018-08-31 | End: 2019-08-12

## 2018-08-31 RX ORDER — TAMSULOSIN HYDROCHLORIDE 0.4 MG/1
0.4 CAPSULE ORAL
Status: COMPLETED | OUTPATIENT
Start: 2018-08-31 | End: 2018-08-31

## 2018-08-31 RX ORDER — OXYCODONE AND ACETAMINOPHEN 5; 325 MG/1; MG/1
2 TABLET ORAL
Status: COMPLETED | OUTPATIENT
Start: 2018-08-31 | End: 2018-08-31

## 2018-08-31 RX ORDER — KETOROLAC TROMETHAMINE 30 MG/ML
30 INJECTION, SOLUTION INTRAMUSCULAR; INTRAVENOUS
Status: COMPLETED | OUTPATIENT
Start: 2018-08-31 | End: 2018-08-31

## 2018-08-31 RX ORDER — ONDANSETRON 2 MG/ML
4 INJECTION INTRAMUSCULAR; INTRAVENOUS
Status: COMPLETED | OUTPATIENT
Start: 2018-08-31 | End: 2018-08-31

## 2018-08-31 RX ORDER — TAMSULOSIN HYDROCHLORIDE 0.4 MG/1
0.4 CAPSULE ORAL DAILY
Qty: 10 CAPSULE | Refills: 0 | Status: SHIPPED | OUTPATIENT
Start: 2018-08-31 | End: 2019-08-12

## 2018-08-31 RX ORDER — OXYCODONE AND ACETAMINOPHEN 5; 325 MG/1; MG/1
1 TABLET ORAL EVERY 4 HOURS PRN
Qty: 5 TABLET | Refills: 0 | Status: SHIPPED | OUTPATIENT
Start: 2018-08-31 | End: 2018-11-23

## 2018-08-31 RX ORDER — KETOROLAC TROMETHAMINE 10 MG/1
10 TABLET, FILM COATED ORAL EVERY 6 HOURS
Qty: 20 TABLET | Refills: 0 | Status: SHIPPED | OUTPATIENT
Start: 2018-08-31 | End: 2019-08-12

## 2018-08-31 RX ORDER — MORPHINE SULFATE 4 MG/ML
4 INJECTION, SOLUTION INTRAMUSCULAR; INTRAVENOUS
Status: COMPLETED | OUTPATIENT
Start: 2018-08-31 | End: 2018-08-31

## 2018-08-31 RX ADMIN — SODIUM CHLORIDE 1000 ML: 0.9 INJECTION, SOLUTION INTRAVENOUS at 03:08

## 2018-08-31 RX ADMIN — TAMSULOSIN HYDROCHLORIDE 0.4 MG: 0.4 CAPSULE ORAL at 05:08

## 2018-08-31 RX ADMIN — KETOROLAC TROMETHAMINE 30 MG: 30 INJECTION, SOLUTION INTRAMUSCULAR at 03:08

## 2018-08-31 RX ADMIN — OXYCODONE AND ACETAMINOPHEN 2 TABLET: 5; 325 TABLET ORAL at 05:08

## 2018-08-31 RX ADMIN — MORPHINE SULFATE 4 MG: 4 INJECTION, SOLUTION INTRAMUSCULAR; INTRAVENOUS at 03:08

## 2018-08-31 RX ADMIN — ONDANSETRON 4 MG: 2 INJECTION INTRAMUSCULAR; INTRAVENOUS at 05:08

## 2018-08-31 RX ADMIN — ONDANSETRON 4 MG: 2 INJECTION INTRAMUSCULAR; INTRAVENOUS at 03:08

## 2018-08-31 RX ADMIN — CEFTRIAXONE 1 G: 1 INJECTION, SOLUTION INTRAVENOUS at 05:08

## 2018-08-31 NOTE — ED PROVIDER NOTES
[facility]                                                  Chief Complaint  53 y.o. male with Flank Pain (left)    History of Present Illness  Joel Deluca presents to the emergency room with acute onset left flank pain or last few hours.  Patient is having nausea and vomiting as well. He denies chest pain, shortness of breath, neurologic symptoms.  He denies at trauma or fall.  He does report that his urine appears darker than normal.  He has no dysuria.  He has never had a kidney stone in the past.  He does report a CVA the past which required intervention.      Past Medical History:   Diagnosis Date    Cerebral cavernous malformation     Cervical disc disease with myelopathy     Hyperlipidemia     Hypertension      Past Surgical History:   Procedure Laterality Date    ANTERIOR CERVICAL DISCECTOMY W/ FUSION      BRAIN SURGERY        Review of patient's allergies indicates:  No Known Allergies     Review of Systems and Physical Exam     Review of Systems  -- Constitution - no fever, denies fatigue, no weakness, no chills  -- Eyes - no tearing or redness, no visual disturbance  -- Ear, Nose - no tinnitus or earache, no nasal congestion or discharge  -- Mouth,Throat - no sore throat, no toothache, normal voice, normal swallowing  -- Respiratory - denies cough and congestion, no shortness of breath, no wheezing  -- Cardiovascular - denies chest pain, no palpitations, denies claudication  -- Gastrointestinal - denies abdominal pain, reports nausea and vomiting-  - Genitourinary - no dysuria, reports left-sided flank pain, no hematuria or frequency   -- Musculoskeletal - denies back pain, negative for myalgias and arthralgias   -- Neurological - no headache, denies weakness or seizure; no LOC  -- Skin - denies pallor, rash, or changes in skin. no hives or welts noted    Vital Signs   oral temperature is 97.2 °F (36.2 °C). His pulse is 88. His respiration is 20.      Physical Exam  -- Nursing  note and vitals reviewed  -- Constitutional: Appears well-developed and well-nourished  -- Head: Atraumatic. Normocephalic. No obvious abnormality  -- Eyes: Pupils are equal and reactive to light. Normal conjunctiva and lids  -- Nose: Nose normal in appearance, nares grossly normal. No discharge  -- Throat: Mucous membranes moist, pharynx normal, normal tonsils. No lesions   -- Ears: External ears and TM normal bilaterally. Normal hearing and no drainage  -- Neck: Normal range of motion. Neck supple. No masses, trachea midline  -- Cardiac: Normal rate, regular rhythm and normal heart sounds  -- Pulmonary: Normal respiratory effort, breath sounds clear to auscultation  -- Abdominal: Soft, no tenderness, no guarding no rebound. Normal bowel sounds. Normal liver edge  -- Musculoskeletal: Normal range of motion, no effusions. Joints stable no CVA tenderness  -- Neurological: No focal deficits. Showed good interaction with staff  -- Vascular: Posterior tibial, dorsalis pedis and radial pulses 2+ bilaterally    -- Lymphatics: No cervical or peripheral lymphadenopathy. No edema noted  -- Skin: Warm and dry. No evidence of rash or cellulitis  -- Psychiatric: Normal mood and affect. Bedside behavior is appropriate    Emergency Room Course     Treatment and Evaluation  1.  Physical exam unremarkable  2.  CBC/CMP within normal limits  3.  Urinalysis positive blood  4.  Normal saline 1 liter  5.  Zofran 4 milligrams IV  6.  Toradol 30 IV  7.  CT abdomen and pelvis renal stone protocol shows 3 millimeter distal ureteral stone with mild hydronephrosis  8.  Flomax 0.4 milligrams p.o.  9.  Morphine 4 milligrams IV  10.  Ceftriaxone 1 gram IV  11.  Discharge home with follow-up PCP, urology    Abnormal lab values  Labs Reviewed   URINALYSIS   CBC W/ AUTO DIFFERENTIAL   COMPREHENSIVE METABOLIC PANEL   LIPASE       Medications Given  Medications   sodium chloride 0.9% bolus 1,000 mL (not administered)   ketorolac injection 30 mg (not  administered)   ondansetron injection 4 mg (not administered)           Diagnosis  -- nephrolithiasis    Disposition and Plan  -- Disposition: home  -- Condition: stable  -- Follow-up: Patient to follow up with Otis Martinez MD in 1-2 days.  -- I advised the patient that we have found no life threatening condition today  -- At this time, I believe the patient is clinically stable for discharge.   -- The patient acknowledges that close follow up with a MD is required   -- Patient agrees to comply with all instruction and direction given in the ER  -- Patient counseled on strict return precautions as discussed       Ana Prakash MD  08/31/18 0581

## 2018-08-31 NOTE — ED NOTES
Discharge instructions and rx x4 given, patient voiced understanding.  Discharged to home in stable condition, waiting in exam room for spouse to arrive to bring him home, NAD.

## 2018-08-31 NOTE — TELEPHONE ENCOUNTER
----- Message from Shital Joshi MA sent at 2018  2:33 PM CDT -----  Contact: Lemuel Deluca  MRN: [id number]  : 1965  PCP: Otis Martinez  Home Phone      [phone]  Work Phone      Not on file.  Mobile          [phone]      MESSAGE: Patient was seen in ER last night for kidney stones and told to follow up on 18. Please call and advise  Phone:[phone]

## 2018-08-31 NOTE — ED NOTES
Unsuccessful IV attempts x2 per myself.  Labs obtained per phlebotomist.  FLORENTINO Russell will attempt to obtain IV access.

## 2018-09-04 ENCOUNTER — OFFICE VISIT (OUTPATIENT)
Dept: FAMILY MEDICINE | Facility: CLINIC | Age: 53
End: 2018-09-04
Payer: COMMERCIAL

## 2018-09-04 VITALS
RESPIRATION RATE: 18 BRPM | BODY MASS INDEX: 35.23 KG/M2 | DIASTOLIC BLOOD PRESSURE: 70 MMHG | HEIGHT: 66 IN | HEART RATE: 76 BPM | SYSTOLIC BLOOD PRESSURE: 110 MMHG | WEIGHT: 219.19 LBS

## 2018-09-04 DIAGNOSIS — R30.0 DYSURIA: ICD-10-CM

## 2018-09-04 DIAGNOSIS — N20.0 RENAL LITHIASIS: Primary | ICD-10-CM

## 2018-09-04 LAB
BACTERIA SPEC CULT: NORMAL
BILIRUB SERPL-MCNC: NORMAL MG/DL
BLOOD URINE, POC: NORMAL
CASTS: NORMAL
COLOR, POC UA: NORMAL
CRYSTALS: NORMAL
GLUCOSE UR QL STRIP: NORMAL
KETONES UR QL STRIP: NORMAL
LEUKOCYTE ESTERASE URINE, POC: NORMAL
NITRITE, POC UA: NORMAL
PH, POC UA: 5
PROTEIN, POC: NORMAL
RBC CELLS COUNTED: NORMAL
SPECIFIC GRAVITY, POC UA: 1.01
UROBILINOGEN, POC UA: NORMAL
WHITE BLOOD CELLS: NORMAL

## 2018-09-04 PROCEDURE — 99999 PR PBB SHADOW E&M-EST. PATIENT-LVL III: CPT | Mod: PBBFAC,,, | Performed by: FAMILY MEDICINE

## 2018-09-04 PROCEDURE — 99213 OFFICE O/P EST LOW 20 MIN: CPT | Mod: 25,S$GLB,, | Performed by: FAMILY MEDICINE

## 2018-09-04 PROCEDURE — 81000 URINALYSIS NONAUTO W/SCOPE: CPT | Mod: S$GLB,,, | Performed by: FAMILY MEDICINE

## 2018-09-04 NOTE — PROGRESS NOTES
Subjective:       Patient ID: Joel Deluca is a 53 y.o. male.    Chief Complaint: Follow-up (ER follow up - kidney stone)    Four days ago patient developed severe left-sided flank pain, nausea, diaphoresis.  He went to the emergency room.  He was diagnosed with a 3 mm kidney stone in the distal ureter.  He is treated in the usual fashion.  His pain resolved the following day.  He is now pain free.  He denies hematuria.      Review of Systems   Constitutional: Negative for activity change, chills, fatigue, fever and unexpected weight change.   HENT: Negative for sore throat and trouble swallowing.    Respiratory: Negative for cough, chest tightness and shortness of breath.    Cardiovascular: Negative for chest pain and leg swelling.   Gastrointestinal: Negative for abdominal pain.   Endocrine: Negative for cold intolerance and heat intolerance.   Genitourinary: Positive for flank pain (resolved left flank pain). Negative for difficulty urinating.   Musculoskeletal: Negative for back pain and joint swelling.   Skin: Negative for rash.   Neurological: Negative for numbness.   Hematological: Negative for adenopathy.   Psychiatric/Behavioral: Negative for decreased concentration.       Objective:      Vitals:    09/04/18 0910   Resp: 18     Physical Exam   Constitutional: He is oriented to person, place, and time. He appears well-developed and well-nourished.   HENT:   Head: Normocephalic and atraumatic.   Eyes: EOM are normal. Pupils are equal, round, and reactive to light.   Neck: Normal range of motion. Neck supple. No JVD present.   Cardiovascular: Normal rate, normal heart sounds and intact distal pulses. Exam reveals no gallop and no friction rub.   No murmur heard.  Pulmonary/Chest: Effort normal and breath sounds normal.   Abdominal: Soft. There is no tenderness.   Musculoskeletal: He exhibits no edema or tenderness.   Neurological: He is alert and oriented to person, place, and time. No cranial nerve deficit.    Skin: No rash noted.   Psychiatric: He has a normal mood and affect. His behavior is normal. Judgment and thought content normal.       UA showed trace blood.  No WBC's or RBC's seen per HPF  BMP  Lab Results   Component Value Date     08/31/2018    K 4.0 08/31/2018     08/31/2018    CO2 26 08/31/2018    BUN 20 08/31/2018    CREATININE 1.3 08/31/2018    CALCIUM 10.2 08/31/2018    ANIONGAP 12 08/31/2018    ESTGFRAFRICA >60 08/31/2018    EGFRNONAA >60 08/31/2018       Assessment:       1. Dysuria    2. Renal lithiasis        Plan:   Ray was seen today for follow-up.    Diagnoses and all orders for this visit:    Renal lithiasis  -     POCT urinalysis, dipstick or tablet reag  -     POCT URINE SEDIMENT EXAM    Patient only had a 3 mm stone in the distal ureter.  I believe this has been completely passed.  He is completely asymptomatic.  No other stones were seen on the CT scan.  I do not believe he needs any other test done.  Recommend patient drink plenty of fluids, stay hydrated.  He may discontinue Flomax.    RTC as needed

## 2018-09-14 ENCOUNTER — HOSPITAL ENCOUNTER (OUTPATIENT)
Dept: SLEEP MEDICINE | Facility: HOSPITAL | Age: 53
Discharge: HOME | End: 2018-09-14
Attending: FAMILY MEDICINE
Payer: COMMERCIAL

## 2018-09-14 DIAGNOSIS — G47.10 PERSISTENT DISORDER OF INITIATING OR MAINTAINING WAKEFULNESS: ICD-10-CM

## 2018-09-14 DIAGNOSIS — G47.33 OSA (OBSTRUCTIVE SLEEP APNEA): ICD-10-CM

## 2018-09-14 PROCEDURE — 95811 POLYSOM 6/>YRS CPAP 4/> PARM: CPT | Mod: 26,,, | Performed by: INTERNAL MEDICINE

## 2018-09-14 PROCEDURE — 95811 POLYSOM 6/>YRS CPAP 4/> PARM: CPT

## 2018-09-26 ENCOUNTER — TELEPHONE (OUTPATIENT)
Dept: FAMILY MEDICINE | Facility: CLINIC | Age: 53
End: 2018-09-26

## 2018-09-26 DIAGNOSIS — G47.33 OSA (OBSTRUCTIVE SLEEP APNEA): Primary | ICD-10-CM

## 2018-11-23 ENCOUNTER — OFFICE VISIT (OUTPATIENT)
Dept: FAMILY MEDICINE | Facility: CLINIC | Age: 53
End: 2018-11-23
Payer: COMMERCIAL

## 2018-11-23 VITALS
HEIGHT: 66 IN | RESPIRATION RATE: 20 BRPM | BODY MASS INDEX: 35.26 KG/M2 | WEIGHT: 219.38 LBS | DIASTOLIC BLOOD PRESSURE: 90 MMHG | HEART RATE: 68 BPM | SYSTOLIC BLOOD PRESSURE: 146 MMHG

## 2018-11-23 DIAGNOSIS — G47.33 OSA (OBSTRUCTIVE SLEEP APNEA): Primary | ICD-10-CM

## 2018-11-23 DIAGNOSIS — I10 ESSENTIAL HYPERTENSION: ICD-10-CM

## 2018-11-23 DIAGNOSIS — E78.5 HYPERLIPIDEMIA, UNSPECIFIED HYPERLIPIDEMIA TYPE: ICD-10-CM

## 2018-11-23 PROCEDURE — 99999 PR PBB SHADOW E&M-EST. PATIENT-LVL III: CPT | Mod: PBBFAC,,, | Performed by: FAMILY MEDICINE

## 2018-11-23 PROCEDURE — 99214 OFFICE O/P EST MOD 30 MIN: CPT | Mod: S$GLB,,, | Performed by: FAMILY MEDICINE

## 2018-11-23 RX ORDER — VALSARTAN 160 MG/1
160 TABLET ORAL DAILY
Qty: 30 TABLET | Refills: 5 | Status: SHIPPED | OUTPATIENT
Start: 2018-11-23 | End: 2019-02-18

## 2018-11-23 NOTE — PROGRESS NOTES
Pt is a 53 y.o. male who presents for check up for   Encounter Diagnoses   Name Primary?    Hyperlipidemia     Hypertension    . Doing well on current meds. Denies any side effects. Prevention is up to date.  He refuses the flu shot    HPI: White male here for checkup today. His blood pressure is running well. He denies side effects such as dry cough, lightheadedness, fatigue. His cholesterol medications were well tolerated. He does not have any side effects such as myalgias. He denies fever chills weight loss or weight gain. He denies chest pain.  Patient recently had blood work done through work.  Lab results were reviewed.  This will be scanned into the computer.  Having more neck pain.  History of cervical fusion.  Gets paresthesia in right hand. pain comes and goes.  Very painful when he lies down.  He is tolerating it and does not want anything done at this time.    He was diagnosed with a cavernous hemangioma in the brain.  In 2017 he underwent brain surgery to remove it.  Has  Doing remarkably well.  His speech has improved nicely.  He is no longer having double vision.  He is starting to play golf.  He is having loud snoring and daytime sleepiness.  Has fitful sleep.  Now on CPAP at 19 and tolerates it well.  He is pretty consistent with it.    ROS:   Gen.: No fever, chills, weight loss, weight gain  HEENT: No headaches, sinus congestion, sore throat, change in vision  CV: No chest pain  RESP: No shortness of breath, wheezing, cough  GI: No change in bowel habits, no diarrhea, no abdominal pain, no hematochezia  : No dysuria, frequency  MSK: Occasional left hip pain  NEURO: No numbness, weakness  ENDO: No polyuria, polydipsia, heat or cold intolerance    PMH, PSH, ALLERGIES, SH, FH reviewed in office note on 4/10/11  Medications reconciled in the nurse's notes    PHYSICAL EXAM;   Vitals:    11/23/18 1245   BP: (!) 146/90   Pulse: 68   Resp: 20     APPEARANCE: Well nourished, well developed, in no acute  distress.   HEAD: Normocephalic, atraumatic.  EYES: PERRL. EOMI.   EARS: TM's intact. Light reflex normal. No retraction or perforation.  Very hard of hearing   NOSE: Mucosa pink. Airway clear.  MOUTH & THROAT: No tonsillar enlargement. No pharyngeal erythema or exudate. No stridor.  NECK: Supple.  No carotid bruits.  No JVD   CHEST: Lungs clear to auscultation.  CARDIOVASCULAR: Normal S1, S2. No rubs, murmurs or gallops.  MUSCULOSKELETAL: No clubbing cyanosis or edema  SKIN: No rashes or pigmented moles.  NEUROLOGIC:    Cranial Nerves: III-XII grossly intact. still suffers with double vision    Motor: No focal deficits   MENTAL STATUS: Normal orientation to person, place and time, normal affect, normal flow thought, normal memory.    Lab Results   Component Value Date    LDLCALC 125.0 08/01/2018   Trig 339  HDL 27  LDLc 119    ASSESSMENT/PLAN:    HTN -   Encouraged patient to avoid table salt and try to follow a 2 g sodium diet  Continue propranolol 80 mg daily, Diovan 160 mg p.o. daily.  Walk 20 minutes per day.  Try to lose weight.    Dyslipidemia-  Low fat diet. Avoid sweets. A 10 pound weight loss by the next visit as a  good goal. Increase consumption of fruits and vegetables, fish and chicken.  Continue Lipitor 20 mg daily, Trilipix 135 mg p.o. daily.    Essential hypertension  -     valsartan (DIOVAN) 160 MG tablet; Take 1 tablet (80 mg total) by mouth once daily.  Dispense: 30 tablet; Refill: 5  -     propranolol (INDERAL LA) 80 MG 24 hr capsule; Take 1 capsule (80 mg total) by mouth once daily.  Dispense: 30 capsule; Refill: 5    S/P resection of Cerebral cavernous malformation  Keep appointment with neurosurgery    Cervical disc disease with myelopathy  Follow-up with neurosurgery for further evaluation    NIA (obstructive sleep apnea)  Continue CPAP at 19 cm pressure    Next appointment will be in 6 months.

## 2018-12-07 DIAGNOSIS — Z12.11 COLON CANCER SCREENING: ICD-10-CM

## 2019-01-01 NOTE — NURSING
Discontinued all 4 IV's intact. Reviewed doscharge instructions with patient and wife and received verbal feedback. Awaiting wheelchair transport.    MD Office

## 2019-02-16 DIAGNOSIS — I10 ESSENTIAL HYPERTENSION: ICD-10-CM

## 2019-02-16 DIAGNOSIS — E78.5 HYPERLIPIDEMIA, UNSPECIFIED HYPERLIPIDEMIA TYPE: ICD-10-CM

## 2019-02-18 RX ORDER — PROPRANOLOL HYDROCHLORIDE 80 MG/1
CAPSULE, EXTENDED RELEASE ORAL
Qty: 30 CAPSULE | Refills: 5 | Status: SHIPPED | OUTPATIENT
Start: 2019-02-18 | End: 2019-08-12 | Stop reason: SDUPTHER

## 2019-02-18 RX ORDER — FENOFIBRIC ACID 135 MG/1
CAPSULE, DELAYED RELEASE ORAL
Qty: 30 CAPSULE | Refills: 5 | Status: SHIPPED | OUTPATIENT
Start: 2019-02-18 | End: 2019-08-12 | Stop reason: SDUPTHER

## 2019-02-18 RX ORDER — ATORVASTATIN CALCIUM 20 MG/1
TABLET, FILM COATED ORAL
Qty: 30 TABLET | Refills: 5 | Status: SHIPPED | OUTPATIENT
Start: 2019-02-18 | End: 2019-08-12 | Stop reason: SDUPTHER

## 2019-02-18 RX ORDER — VALSARTAN 80 MG/1
TABLET ORAL
Qty: 30 TABLET | Refills: 5 | Status: SHIPPED | OUTPATIENT
Start: 2019-02-18 | End: 2019-08-12

## 2019-02-25 ENCOUNTER — TELEPHONE (OUTPATIENT)
Dept: FAMILY MEDICINE | Facility: CLINIC | Age: 54
End: 2019-02-25

## 2019-02-25 NOTE — TELEPHONE ENCOUNTER
----- Message from Norris Zayas sent at 2019 10:09 AM CST -----  Contact: Patient  Joel Deluca  MRN: [id number]  : 1965  PCP: Otis Martinez  Home Phone      [phone]  Work Phone      Not on file.  Mobile          [phone]      MESSAGE: flu like symptoms --requesting appt today    Call [phone]    PCP: Michelle

## 2019-07-29 ENCOUNTER — PATIENT OUTREACH (OUTPATIENT)
Dept: ADMINISTRATIVE | Facility: HOSPITAL | Age: 54
End: 2019-07-29

## 2019-08-06 DIAGNOSIS — I10 ESSENTIAL HYPERTENSION: ICD-10-CM

## 2019-08-06 RX ORDER — VALSARTAN 160 MG/1
TABLET ORAL
Qty: 30 TABLET | Refills: 5 | Status: SHIPPED | OUTPATIENT
Start: 2019-08-06 | End: 2019-12-16 | Stop reason: RX

## 2019-08-12 ENCOUNTER — OFFICE VISIT (OUTPATIENT)
Dept: FAMILY MEDICINE | Facility: CLINIC | Age: 54
End: 2019-08-12
Payer: COMMERCIAL

## 2019-08-12 VITALS
HEART RATE: 60 BPM | SYSTOLIC BLOOD PRESSURE: 120 MMHG | RESPIRATION RATE: 18 BRPM | WEIGHT: 225 LBS | BODY MASS INDEX: 36.16 KG/M2 | HEIGHT: 66 IN | DIASTOLIC BLOOD PRESSURE: 70 MMHG

## 2019-08-12 DIAGNOSIS — E78.5 HYPERLIPIDEMIA, UNSPECIFIED HYPERLIPIDEMIA TYPE: ICD-10-CM

## 2019-08-12 DIAGNOSIS — I10 ESSENTIAL HYPERTENSION: Primary | ICD-10-CM

## 2019-08-12 DIAGNOSIS — G47.33 OSA (OBSTRUCTIVE SLEEP APNEA): ICD-10-CM

## 2019-08-12 DIAGNOSIS — Z23 NEED FOR DIPHTHERIA-TETANUS-PERTUSSIS (TDAP) VACCINE: ICD-10-CM

## 2019-08-12 DIAGNOSIS — M50.00 CERVICAL DISC DISEASE WITH MYELOPATHY: ICD-10-CM

## 2019-08-12 LAB
ALBUMIN SERPL BCP-MCNC: 4.3 G/DL (ref 3.5–5.2)
ALP SERPL-CCNC: 57 U/L (ref 55–135)
ALT SERPL W/O P-5'-P-CCNC: 25 U/L (ref 10–44)
ANION GAP SERPL CALC-SCNC: 9 MMOL/L (ref 8–16)
AST SERPL-CCNC: 23 U/L (ref 10–40)
BILIRUB SERPL-MCNC: 0.4 MG/DL (ref 0.1–1)
BUN SERPL-MCNC: 17 MG/DL (ref 6–20)
CALCIUM SERPL-MCNC: 9.9 MG/DL (ref 8.7–10.5)
CHLORIDE SERPL-SCNC: 105 MMOL/L (ref 95–110)
CHOLEST SERPL-MCNC: 180 MG/DL (ref 120–199)
CHOLEST/HDLC SERPL: 7.2 {RATIO} (ref 2–5)
CO2 SERPL-SCNC: 26 MMOL/L (ref 23–29)
CREAT SERPL-MCNC: 1.1 MG/DL (ref 0.5–1.4)
EST. GFR  (AFRICAN AMERICAN): >60 ML/MIN/1.73 M^2
EST. GFR  (NON AFRICAN AMERICAN): >60 ML/MIN/1.73 M^2
GLUCOSE SERPL-MCNC: 68 MG/DL (ref 70–110)
HDLC SERPL-MCNC: 25 MG/DL (ref 40–75)
HDLC SERPL: 13.9 % (ref 20–50)
LDLC SERPL CALC-MCNC: 90.2 MG/DL (ref 63–159)
NONHDLC SERPL-MCNC: 155 MG/DL
POTASSIUM SERPL-SCNC: 3.8 MMOL/L (ref 3.5–5.1)
PROT SERPL-MCNC: 7.7 G/DL (ref 6–8.4)
SODIUM SERPL-SCNC: 140 MMOL/L (ref 136–145)
TRIGL SERPL-MCNC: 324 MG/DL (ref 30–150)
TSH SERPL DL<=0.005 MIU/L-ACNC: 2.41 UIU/ML (ref 0.4–4)

## 2019-08-12 PROCEDURE — 99214 PR OFFICE/OUTPT VISIT, EST, LEVL IV, 30-39 MIN: ICD-10-PCS | Mod: 25,S$GLB,, | Performed by: FAMILY MEDICINE

## 2019-08-12 PROCEDURE — 36415 COLL VENOUS BLD VENIPUNCTURE: CPT | Mod: S$GLB,,, | Performed by: FAMILY MEDICINE

## 2019-08-12 PROCEDURE — 80061 LIPID PANEL: CPT

## 2019-08-12 PROCEDURE — 99999 PR PBB SHADOW E&M-EST. PATIENT-LVL III: CPT | Mod: PBBFAC,,, | Performed by: FAMILY MEDICINE

## 2019-08-12 PROCEDURE — 90715 TDAP VACCINE 7 YRS/> IM: CPT | Mod: S$GLB,,, | Performed by: FAMILY MEDICINE

## 2019-08-12 PROCEDURE — 99214 OFFICE O/P EST MOD 30 MIN: CPT | Mod: 25,S$GLB,, | Performed by: FAMILY MEDICINE

## 2019-08-12 PROCEDURE — 99999 PR PBB SHADOW E&M-EST. PATIENT-LVL III: ICD-10-PCS | Mod: PBBFAC,,, | Performed by: FAMILY MEDICINE

## 2019-08-12 PROCEDURE — 84443 ASSAY THYROID STIM HORMONE: CPT

## 2019-08-12 PROCEDURE — 90715 TDAP VACCINE GREATER THAN OR EQUAL TO 7YO IM: ICD-10-PCS | Mod: S$GLB,,, | Performed by: FAMILY MEDICINE

## 2019-08-12 PROCEDURE — 90471 TDAP VACCINE GREATER THAN OR EQUAL TO 7YO IM: ICD-10-PCS | Mod: S$GLB,,, | Performed by: FAMILY MEDICINE

## 2019-08-12 PROCEDURE — 90471 IMMUNIZATION ADMIN: CPT | Mod: S$GLB,,, | Performed by: FAMILY MEDICINE

## 2019-08-12 PROCEDURE — 36415 PR COLLECTION VENOUS BLOOD,VENIPUNCTURE: ICD-10-PCS | Mod: S$GLB,,, | Performed by: FAMILY MEDICINE

## 2019-08-12 PROCEDURE — 80053 COMPREHEN METABOLIC PANEL: CPT

## 2019-08-12 RX ORDER — FENOFIBRIC ACID 135 MG/1
1 CAPSULE, DELAYED RELEASE ORAL DAILY
Qty: 30 CAPSULE | Refills: 5 | Status: SHIPPED | OUTPATIENT
Start: 2019-08-12 | End: 2020-02-10 | Stop reason: SDUPTHER

## 2019-08-12 RX ORDER — PROPRANOLOL HYDROCHLORIDE 80 MG/1
80 CAPSULE, EXTENDED RELEASE ORAL DAILY
Qty: 30 CAPSULE | Refills: 5 | Status: SHIPPED | OUTPATIENT
Start: 2019-08-12 | End: 2020-02-10 | Stop reason: SDUPTHER

## 2019-08-12 RX ORDER — ATORVASTATIN CALCIUM 20 MG/1
20 TABLET, FILM COATED ORAL DAILY
Qty: 30 TABLET | Refills: 5 | Status: SHIPPED | OUTPATIENT
Start: 2019-08-12 | End: 2020-02-10 | Stop reason: SDUPTHER

## 2019-08-12 NOTE — PROGRESS NOTES
Pt is a 54 y.o. male who presents for check up for   Encounter Diagnoses   Name Primary?    Hyperlipidemia     Hypertension    . Doing well on current meds. Denies any side effects. Prevention is up to date.  He refuses the flu shot    HPI: White male here for checkup today. His blood pressure is running well. He denies side effects such as dry cough, lightheadedness, fatigue. His cholesterol medications were well tolerated. He does not have any side effects such as myalgias. He denies fever chills weight loss or weight gain. He denies chest pain.  Patient recently had blood work done through work.  Lab results were reviewed.  This will be scanned into the computer.  Having more neck pain.  History of cervical fusion.  Gets paresthesia in right hand. pain comes and goes.  Very painful when he lies down.  He is tolerating it and does not want anything done at this time.    He was diagnosed with a cavernous hemangioma in the brain.  In 2017 he underwent brain surgery to remove it.  Has  Doing remarkably well.  His speech has improved nicely.  He is no longer having double vision.  He is starting to play golf.  He is having loud snoring and daytime sleepiness.  Has fitful sleep.  Now on CPAP at 19 and tolerates it well.  He is pretty consistent with it.    ROS:   Gen.: No fever, chills, weight loss, weight gain  HEENT: No headaches, sinus congestion, sore throat, change in vision  CV: No chest pain  RESP: No shortness of breath, wheezing, cough  GI: No change in bowel habits, no diarrhea, no abdominal pain, no hematochezia  : No dysuria, frequency  MSK: Occasional left hip pain  NEURO: No numbness, weakness  ENDO: No polyuria, polydipsia, heat or cold intolerance    PMH, PSH, ALLERGIES, SH, FH reviewed in office note on 4/10/11  Medications reconciled in the nurse's notes    PHYSICAL EXAM;   Vitals:    08/12/19 1504   BP: 120/70   Pulse: 60   Resp: 18     APPEARANCE: Well nourished, well developed, in no acute  distress.   HEAD: Normocephalic, atraumatic.  EYES: PERRL. EOMI.   EARS: TM's intact. Light reflex normal. No retraction or perforation.  Very hard of hearing   NOSE: Mucosa pink. Airway clear.  MOUTH & THROAT: No tonsillar enlargement. No pharyngeal erythema or exudate. No stridor.  NECK: Supple.  No carotid bruits.  No JVD   CHEST: Lungs clear to auscultation.  CARDIOVASCULAR: Normal S1, S2. No rubs, murmurs or gallops.  MUSCULOSKELETAL: No clubbing cyanosis or edema  SKIN: No rashes or pigmented moles.  NEUROLOGIC:    Cranial Nerves: III-XII grossly intact. still suffers with double vision    Motor: No focal deficits   MENTAL STATUS: Normal orientation to person, place and time, normal affect, normal flow thought, normal memory.    Lab Results   Component Value Date    LDLCALC 125.0 08/01/2018   Trig 339  HDL 27  LDLc 119    BMP  Lab Results   Component Value Date     08/31/2018    K 4.0 08/31/2018     08/31/2018    CO2 26 08/31/2018    BUN 20 08/31/2018    CREATININE 1.3 08/31/2018    CALCIUM 10.2 08/31/2018    ANIONGAP 12 08/31/2018    ESTGFRAFRICA >60 08/31/2018    EGFRNONAA >60 08/31/2018     ASSESSMENT/PLAN:    HTN -   Encouraged patient to avoid table salt and try to follow a 2 g sodium diet  Continue propranolol 80 mg daily, Diovan 160 mg p.o. daily.  Walk 20 minutes per day.  Try to lose weight.    Dyslipidemia-  Low fat diet. Avoid sweets. A 10 pound weight loss by the next visit as a  good goal. Increase consumption of fruits and vegetables, fish and chicken.  Continue Lipitor 20 mg daily, Trilipix 135 mg p.o. daily.    S/P resection of Cerebral cavernous malformation  Doing well.  No longer requires neurosurgical care.    Cervical disc disease with myelopathy  Follow-up with neurosurgery for further evaluation    NIA (obstructive sleep apnea)  Continue CPAP at 19 cm pressure    Next appointment will be in 6 months.

## 2019-12-12 DIAGNOSIS — Z12.11 COLON CANCER SCREENING: ICD-10-CM

## 2019-12-16 ENCOUNTER — TELEPHONE (OUTPATIENT)
Dept: FAMILY MEDICINE | Facility: CLINIC | Age: 54
End: 2019-12-16

## 2019-12-16 DIAGNOSIS — I10 ESSENTIAL HYPERTENSION: Primary | ICD-10-CM

## 2019-12-16 DIAGNOSIS — E78.5 HYPERLIPIDEMIA, UNSPECIFIED HYPERLIPIDEMIA TYPE: ICD-10-CM

## 2019-12-16 RX ORDER — IRBESARTAN 150 MG/1
150 TABLET ORAL NIGHTLY
Qty: 30 TABLET | Refills: 5 | Status: SHIPPED | OUTPATIENT
Start: 2019-12-16 | End: 2020-02-10 | Stop reason: SDUPTHER

## 2019-12-16 NOTE — TELEPHONE ENCOUNTER
Contacted and informed pt verbalized understanding. Would like to know if he needs blood work? Please advise, thank you.

## 2019-12-16 NOTE — TELEPHONE ENCOUNTER
"----- Message from Claudia Urbano sent at 2019  9:21 AM CST -----  Contact: candice Deluca  MRN: [id number]  : 1965  PCP: Otis Martinez  Home Phone      [phone]  Work Phone      Not on file.  Mobile          [phone]      MESSAGE:   Patient talked to his pharmacy and they said they are not able to fill his valsartan (DIOVAN) 160 MG tablet because there is a "Shortage in [place]" for it. So he is trying to see what he needs to do or if hes going to get put on something else.     [facility] [address]    [phone]  "

## 2019-12-16 NOTE — TELEPHONE ENCOUNTER
Essential hypertension  -     irbesartan (AVAPRO) 150 MG tablet; Take 1 tablet (150 mg total) by mouth every evening.  Dispense: 30 tablet; Refill: 5

## 2020-02-10 ENCOUNTER — OFFICE VISIT (OUTPATIENT)
Dept: FAMILY MEDICINE | Facility: CLINIC | Age: 55
End: 2020-02-10
Payer: COMMERCIAL

## 2020-02-10 VITALS
SYSTOLIC BLOOD PRESSURE: 130 MMHG | WEIGHT: 214.5 LBS | DIASTOLIC BLOOD PRESSURE: 82 MMHG | HEIGHT: 66 IN | BODY MASS INDEX: 34.47 KG/M2 | RESPIRATION RATE: 18 BRPM | HEART RATE: 72 BPM

## 2020-02-10 DIAGNOSIS — I10 ESSENTIAL HYPERTENSION: ICD-10-CM

## 2020-02-10 DIAGNOSIS — L91.8 SKIN TAG: ICD-10-CM

## 2020-02-10 DIAGNOSIS — E78.5 HYPERLIPIDEMIA, UNSPECIFIED HYPERLIPIDEMIA TYPE: ICD-10-CM

## 2020-02-10 DIAGNOSIS — M50.00 CERVICAL DISC DISEASE WITH MYELOPATHY: Primary | ICD-10-CM

## 2020-02-10 PROCEDURE — 17110 PR DESTRUCTION BENIGN LESIONS UP TO 14: ICD-10-PCS | Mod: S$GLB,,, | Performed by: FAMILY MEDICINE

## 2020-02-10 PROCEDURE — 99999 PR PBB SHADOW E&M-EST. PATIENT-LVL III: CPT | Mod: PBBFAC,,, | Performed by: FAMILY MEDICINE

## 2020-02-10 PROCEDURE — 99999 PR PBB SHADOW E&M-EST. PATIENT-LVL III: ICD-10-PCS | Mod: PBBFAC,,, | Performed by: FAMILY MEDICINE

## 2020-02-10 PROCEDURE — 17110 DESTRUCTION B9 LES UP TO 14: CPT | Mod: S$GLB,,, | Performed by: FAMILY MEDICINE

## 2020-02-10 PROCEDURE — 99214 OFFICE O/P EST MOD 30 MIN: CPT | Mod: 25,S$GLB,, | Performed by: FAMILY MEDICINE

## 2020-02-10 PROCEDURE — 36415 COLL VENOUS BLD VENIPUNCTURE: CPT | Mod: S$GLB,,, | Performed by: FAMILY MEDICINE

## 2020-02-10 PROCEDURE — 11200 PR REMOVAL OF SKIN TAGS, UP TO 15: ICD-10-PCS | Mod: 59,S$GLB,, | Performed by: FAMILY MEDICINE

## 2020-02-10 PROCEDURE — 80061 LIPID PANEL: CPT

## 2020-02-10 PROCEDURE — 99214 PR OFFICE/OUTPT VISIT, EST, LEVL IV, 30-39 MIN: ICD-10-PCS | Mod: 25,S$GLB,, | Performed by: FAMILY MEDICINE

## 2020-02-10 PROCEDURE — 80053 COMPREHEN METABOLIC PANEL: CPT

## 2020-02-10 PROCEDURE — 11200 RMVL SKIN TAGS UP TO&INC 15: CPT | Mod: 59,S$GLB,, | Performed by: FAMILY MEDICINE

## 2020-02-10 PROCEDURE — 36415 PR COLLECTION VENOUS BLOOD,VENIPUNCTURE: ICD-10-PCS | Mod: S$GLB,,, | Performed by: FAMILY MEDICINE

## 2020-02-10 RX ORDER — FENOFIBRIC ACID 135 MG/1
1 CAPSULE, DELAYED RELEASE ORAL DAILY
Qty: 30 CAPSULE | Refills: 5 | Status: SHIPPED | OUTPATIENT
Start: 2020-02-10 | End: 2020-08-14 | Stop reason: SDUPTHER

## 2020-02-10 RX ORDER — IRBESARTAN 150 MG/1
150 TABLET ORAL DAILY
Qty: 30 TABLET | Refills: 5 | Status: SHIPPED | OUTPATIENT
Start: 2020-02-10 | End: 2020-08-14 | Stop reason: SDUPTHER

## 2020-02-10 RX ORDER — ATORVASTATIN CALCIUM 20 MG/1
20 TABLET, FILM COATED ORAL DAILY
Qty: 30 TABLET | Refills: 5 | Status: SHIPPED | OUTPATIENT
Start: 2020-02-10 | End: 2020-08-14 | Stop reason: SDUPTHER

## 2020-02-10 RX ORDER — PROPRANOLOL HYDROCHLORIDE 80 MG/1
80 CAPSULE, EXTENDED RELEASE ORAL DAILY
Qty: 30 CAPSULE | Refills: 5 | Status: SHIPPED | OUTPATIENT
Start: 2020-02-10 | End: 2020-08-14 | Stop reason: SDUPTHER

## 2020-02-10 NOTE — PROGRESS NOTES
Pt is a 55 y.o. male who presents for check up for   Encounter Diagnoses   Name Primary?    Hyperlipidemia     Hypertension    . Doing well on current meds. Denies any side effects. Prevention is up to date.  He refuses the flu shot    HPI: White male here for checkup today.   His blood pressure is running well. He denies side effects such as dry cough, lightheadedness, fatigue. His cholesterol medications were well tolerated. He does not have any side effects such as myalgias. He denies fever chills weight loss or weight gain. He denies chest pain.  Patient recently had blood work done through work.  Lab results were reviewed.  This will be scanned into the computer.  Having more neck pain.  History of cervical fusion.  Gets paresthesia in right hand. pain comes and goes.  Very painful when he lies down.  He is getting worse with right side weakness.  He was diagnosed with a cavernous hemangioma in the brain.  In 2017 he underwent brain surgery to remove it.  Has  Doing remarkably well.  His speech has improved nicely.  He is no longer having double vision.  He is starting to play golf.  He is having loud snoring and daytime sleepiness.  Has fitful sleep.  Now on CPAP at 19 and tolerates it well.  He is pretty consistent with it.  Has 3 skin tags.  Wants them frozen    ROS:   Gen.: No fever, chills, weight loss, weight gain  HEENT: No headaches, sinus congestion, sore throat, change in vision  CV: No chest pain  RESP: No shortness of breath, wheezing, cough  GI: No change in bowel habits, no diarrhea, no abdominal pain, no hematochezia  : No dysuria, frequency  MSK: Occasional left hip pain  NEURO: No numbness, weakness  ENDO: No polyuria, polydipsia, heat or cold intolerance    PMH, PSH, ALLERGIES, SH, FH reviewed in office note on 4/10/11  Medications reconciled in the nurse's notes    PHYSICAL EXAM;   Vitals:    02/10/20 1244   BP: 130/82   Pulse: 72   Resp: 18     APPEARANCE: Well nourished, well developed,  in no acute distress.   HEAD: Normocephalic, atraumatic.  EYES: PERRL. EOMI.   EARS: TM's intact. Light reflex normal. No retraction or perforation.  Very hard of hearing   NOSE: Mucosa pink. Airway clear.  MOUTH & THROAT: No tonsillar enlargement. No pharyngeal erythema or exudate. No stridor.  NECK: Supple.  No carotid bruits.  No JVD   CHEST: Lungs clear to auscultation.  CARDIOVASCULAR: Normal S1, S2. No rubs, murmurs or gallops.  MUSCULOSKELETAL: No clubbing cyanosis or edema  SKIN:  1 skin tag, 2 moles consistent with seborrheic keratoses present.  These were frozen with cryo  NEUROLOGIC:    Cranial Nerves: III-XII grossly intact. still suffers with double vision    Motor: No focal deficits   MENTAL STATUS: Normal orientation to person, place and time, normal affect, normal flow thought, normal memory.    Lab Results   Component Value Date    LDLCALC 90.2 08/12/2019   Trig 339  HDL 27  LDLc 119    BMP  Lab Results   Component Value Date     08/12/2019    K 3.8 08/12/2019     08/12/2019    CO2 26 08/12/2019    BUN 17 08/12/2019    CREATININE 1.1 08/12/2019    CALCIUM 9.9 08/12/2019    ANIONGAP 9 08/12/2019    ESTGFRAFRICA >60 08/12/2019    EGFRNONAA >60 08/12/2019     ASSESSMENT/PLAN:    HTN -   Encouraged patient to avoid table salt and try to follow a 2 g sodium diet  Continue propranolol 80 mg daily, Diovan 160 mg p.o. daily.  Walk 20 minutes per day.  Try to lose weight.    Dyslipidemia-  Low fat diet. Avoid sweets. A 10 pound weight loss by the next visit as a  good goal. Increase consumption of fruits and vegetables, fish and chicken.  Continue Lipitor 20 mg daily, Trilipix 135 mg p.o. daily.    S/P resection of Cerebral cavernous malformation  Doing well.  No longer requires neurosurgical care.    Cervical disc disease with myelopathy  Follow-up with neurosurgery.  Referral in process    NIA (obstructive sleep apnea)  Continue CPAP at 19 cm pressure    1 Skin tags, 2 seborrheic keratosis  frozen    Next appointment will be in 6 months.

## 2020-02-11 LAB
ALBUMIN SERPL BCP-MCNC: 4.6 G/DL (ref 3.5–5.2)
ALP SERPL-CCNC: 60 U/L (ref 55–135)
ALT SERPL W/O P-5'-P-CCNC: 24 U/L (ref 10–44)
ANION GAP SERPL CALC-SCNC: 15 MMOL/L (ref 8–16)
AST SERPL-CCNC: 25 U/L (ref 10–40)
BILIRUB SERPL-MCNC: 0.6 MG/DL (ref 0.1–1)
BUN SERPL-MCNC: 18 MG/DL (ref 6–20)
CALCIUM SERPL-MCNC: 10.6 MG/DL (ref 8.7–10.5)
CHLORIDE SERPL-SCNC: 101 MMOL/L (ref 95–110)
CHOLEST SERPL-MCNC: 192 MG/DL (ref 120–199)
CHOLEST/HDLC SERPL: 5.8 {RATIO} (ref 2–5)
CO2 SERPL-SCNC: 24 MMOL/L (ref 23–29)
CREAT SERPL-MCNC: 1.1 MG/DL (ref 0.5–1.4)
EST. GFR  (AFRICAN AMERICAN): >60 ML/MIN/1.73 M^2
EST. GFR  (NON AFRICAN AMERICAN): >60 ML/MIN/1.73 M^2
GLUCOSE SERPL-MCNC: 88 MG/DL (ref 70–110)
HDLC SERPL-MCNC: 33 MG/DL (ref 40–75)
HDLC SERPL: 17.2 % (ref 20–50)
LDLC SERPL CALC-MCNC: 93.4 MG/DL (ref 63–159)
NONHDLC SERPL-MCNC: 159 MG/DL
POTASSIUM SERPL-SCNC: 3.9 MMOL/L (ref 3.5–5.1)
PROT SERPL-MCNC: 8.2 G/DL (ref 6–8.4)
SODIUM SERPL-SCNC: 140 MMOL/L (ref 136–145)
TRIGL SERPL-MCNC: 328 MG/DL (ref 30–150)

## 2020-08-14 ENCOUNTER — OFFICE VISIT (OUTPATIENT)
Dept: FAMILY MEDICINE | Facility: CLINIC | Age: 55
End: 2020-08-14
Payer: COMMERCIAL

## 2020-08-14 VITALS
WEIGHT: 212.06 LBS | TEMPERATURE: 97 F | SYSTOLIC BLOOD PRESSURE: 136 MMHG | HEIGHT: 66 IN | DIASTOLIC BLOOD PRESSURE: 88 MMHG | BODY MASS INDEX: 34.08 KG/M2 | HEART RATE: 70 BPM | RESPIRATION RATE: 18 BRPM

## 2020-08-14 DIAGNOSIS — E78.5 HYPERLIPIDEMIA, UNSPECIFIED HYPERLIPIDEMIA TYPE: ICD-10-CM

## 2020-08-14 DIAGNOSIS — Q28.3 CAVERNOUS MALFORMATION: ICD-10-CM

## 2020-08-14 DIAGNOSIS — I10 ESSENTIAL HYPERTENSION: ICD-10-CM

## 2020-08-14 DIAGNOSIS — G47.33 OSA (OBSTRUCTIVE SLEEP APNEA): Primary | ICD-10-CM

## 2020-08-14 LAB
ALBUMIN SERPL BCP-MCNC: 4.4 G/DL (ref 3.5–5.2)
ALP SERPL-CCNC: 56 U/L (ref 55–135)
ALT SERPL W/O P-5'-P-CCNC: 18 U/L (ref 10–44)
ANION GAP SERPL CALC-SCNC: 11 MMOL/L (ref 8–16)
AST SERPL-CCNC: 19 U/L (ref 10–40)
BILIRUB SERPL-MCNC: 0.4 MG/DL (ref 0.1–1)
BUN SERPL-MCNC: 23 MG/DL (ref 6–20)
CALCIUM SERPL-MCNC: 10.1 MG/DL (ref 8.7–10.5)
CHLORIDE SERPL-SCNC: 104 MMOL/L (ref 95–110)
CHOLEST SERPL-MCNC: 190 MG/DL (ref 120–199)
CHOLEST/HDLC SERPL: 5.6 {RATIO} (ref 2–5)
CO2 SERPL-SCNC: 26 MMOL/L (ref 23–29)
CREAT SERPL-MCNC: 1.2 MG/DL (ref 0.5–1.4)
EST. GFR  (AFRICAN AMERICAN): >60 ML/MIN/1.73 M^2
EST. GFR  (NON AFRICAN AMERICAN): >60 ML/MIN/1.73 M^2
GLUCOSE SERPL-MCNC: 89 MG/DL (ref 70–110)
HDLC SERPL-MCNC: 34 MG/DL (ref 40–75)
HDLC SERPL: 17.9 % (ref 20–50)
LDLC SERPL CALC-MCNC: 114.8 MG/DL (ref 63–159)
NONHDLC SERPL-MCNC: 156 MG/DL
POTASSIUM SERPL-SCNC: 4.1 MMOL/L (ref 3.5–5.1)
PROT SERPL-MCNC: 7.9 G/DL (ref 6–8.4)
SODIUM SERPL-SCNC: 141 MMOL/L (ref 136–145)
TRIGL SERPL-MCNC: 206 MG/DL (ref 30–150)

## 2020-08-14 PROCEDURE — 80053 COMPREHEN METABOLIC PANEL: CPT

## 2020-08-14 PROCEDURE — 99214 PR OFFICE/OUTPT VISIT, EST, LEVL IV, 30-39 MIN: ICD-10-PCS | Mod: S$GLB,,, | Performed by: FAMILY MEDICINE

## 2020-08-14 PROCEDURE — 80061 LIPID PANEL: CPT

## 2020-08-14 PROCEDURE — 36415 COLL VENOUS BLD VENIPUNCTURE: CPT | Mod: S$GLB,,, | Performed by: FAMILY MEDICINE

## 2020-08-14 PROCEDURE — 36415 PR COLLECTION VENOUS BLOOD,VENIPUNCTURE: ICD-10-PCS | Mod: S$GLB,,, | Performed by: FAMILY MEDICINE

## 2020-08-14 PROCEDURE — 99999 PR PBB SHADOW E&M-EST. PATIENT-LVL III: CPT | Mod: PBBFAC,,, | Performed by: FAMILY MEDICINE

## 2020-08-14 PROCEDURE — 99214 OFFICE O/P EST MOD 30 MIN: CPT | Mod: S$GLB,,, | Performed by: FAMILY MEDICINE

## 2020-08-14 PROCEDURE — 99999 PR PBB SHADOW E&M-EST. PATIENT-LVL III: ICD-10-PCS | Mod: PBBFAC,,, | Performed by: FAMILY MEDICINE

## 2020-08-14 RX ORDER — ATORVASTATIN CALCIUM 20 MG/1
20 TABLET, FILM COATED ORAL DAILY
Qty: 30 TABLET | Refills: 5 | Status: SHIPPED | OUTPATIENT
Start: 2020-08-14 | End: 2021-02-26 | Stop reason: SDUPTHER

## 2020-08-14 RX ORDER — FENOFIBRIC ACID 135 MG/1
1 CAPSULE, DELAYED RELEASE ORAL DAILY
Qty: 30 CAPSULE | Refills: 5 | Status: SHIPPED | OUTPATIENT
Start: 2020-08-14 | End: 2021-02-26 | Stop reason: SDUPTHER

## 2020-08-14 RX ORDER — IRBESARTAN 300 MG/1
300 TABLET ORAL DAILY
Qty: 30 TABLET | Refills: 5 | Status: SHIPPED | OUTPATIENT
Start: 2020-08-14 | End: 2021-02-26 | Stop reason: SDUPTHER

## 2020-08-14 RX ORDER — PROPRANOLOL HYDROCHLORIDE 80 MG/1
80 CAPSULE, EXTENDED RELEASE ORAL DAILY
Qty: 30 CAPSULE | Refills: 5 | Status: SHIPPED | OUTPATIENT
Start: 2020-08-14 | End: 2021-02-26 | Stop reason: SDUPTHER

## 2020-08-14 NOTE — PROGRESS NOTES
Pt is a 55 y.o. male who presents for check up for   Encounter Diagnoses   Name Primary?    Hyperlipidemia     Hypertension    . Doing well on current meds. Denies any side effects. Prevention is up to date.  He refuses the flu shot    HPI: White male here for checkup today.   His blood pressure is running well. He denies side effects such as dry cough, lightheadedness, fatigue. His cholesterol medications were well tolerated. He does not have any side effects such as myalgias. He denies fever chills weight loss or weight gain. He denies chest pain.  Patient recently had blood work done through work.  Lab results were reviewed.  This will be scanned into the computer.  Having more neck pain.  History of cervical fusion.  Gets paresthesia in right hand. pain comes and goes.  Very painful when he lies down.  He is getting worse with right side weakness.  He was diagnosed with a cavernous hemangioma in the brain.  In 2017 he underwent brain surgery to remove it.  Has  Doing remarkably well.  His speech has improved nicely.  He is no longer having double vision.  He is starting to play golf.  He is having loud snoring and daytime sleepiness.  Has fitful sleep.  Now on CPAP at 19.  He quit using it since his divorce.    ROS:   Gen.: No fever, chills, weight loss, weight gain  HEENT: No headaches, sinus congestion, sore throat, change in vision  CV: No chest pain  RESP: No shortness of breath, wheezing, cough  GI: No change in bowel habits, no diarrhea, no abdominal pain, no hematochezia  : No dysuria, frequency  MSK: Occasional left hip pain  NEURO: No numbness, weakness  ENDO: No polyuria, polydipsia, heat or cold intolerance    PMH, PSH, ALLERGIES, SH, FH reviewed in office note on 4/10/11  Medications reconciled in the nurse's notes    PHYSICAL EXAM;   Vitals:    08/14/20 1113   BP: 136/88   Pulse: 70   Resp:    Temp:      APPEARANCE: Well nourished, well developed, in no acute distress.   HEAD: Normocephalic,  atraumatic.  EYES: PERRL. EOMI.   EARS: TM's intact. Light reflex normal. No retraction or perforation.  Very hard of hearing   NOSE: Mucosa pink. Airway clear.  MOUTH & THROAT: No tonsillar enlargement. No pharyngeal erythema or exudate. No stridor.  NECK: Supple.  No carotid bruits.  No JVD   CHEST: Lungs clear to auscultation.  CARDIOVASCULAR: Normal S1, S2. No rubs, murmurs or gallops.  MUSCULOSKELETAL: No clubbing cyanosis or edema  SKIN:  1 skin tag, 2 moles consistent with seborrheic keratoses present.  These were frozen with cryo  NEUROLOGIC:    Cranial Nerves: III-XII grossly intact. still suffers with double vision    Motor: No focal deficits   MENTAL STATUS: Normal orientation to person, place and time, normal affect, normal flow thought, normal memory.    Lab Results   Component Value Date    LDLCALC 93.4 02/10/2020   Trig 339  HDL 27  LDLc 119    BMP  Lab Results   Component Value Date     02/10/2020    K 3.9 02/10/2020     02/10/2020    CO2 24 02/10/2020    BUN 18 02/10/2020    CREATININE 1.1 02/10/2020    CALCIUM 10.6 (H) 02/10/2020    ANIONGAP 15 02/10/2020    ESTGFRAFRICA >60 02/10/2020    EGFRNONAA >60 02/10/2020     ASSESSMENT/PLAN:    HTN -   Encouraged patient to avoid table salt and try to follow a 2 g sodium diet  Continue propranolol 80 mg daily  Increase Irbisartan to 300 mg daily  Walk 20 minutes per day.  Try to lose weight.    Dyslipidemia-  Low fat diet. Avoid sweets. A 10 pound weight loss by the next visit as a  good goal. Increase consumption of fruits and vegetables, fish and chicken.  Continue Lipitor 20 mg daily, Trilipix 135 mg p.o. daily.    S/P resection of Cerebral cavernous malformation  Doing well.  No longer requires neurosurgical care.    Cervical disc disease with myelopathy  Follow-up with neurosurgery.  Referral in process    NIA (obstructive sleep apnea)  Advised him to continue CPAP at 19 cm pressure    Next appointment will be in 6 months.

## 2020-08-21 DIAGNOSIS — Z11.59 NEED FOR HEPATITIS C SCREENING TEST: ICD-10-CM

## 2020-12-23 DIAGNOSIS — Z12.11 COLON CANCER SCREENING: ICD-10-CM

## 2021-02-15 ENCOUNTER — OFFICE VISIT (OUTPATIENT)
Dept: URGENT CARE | Facility: CLINIC | Age: 56
End: 2021-02-15
Payer: COMMERCIAL

## 2021-02-15 VITALS
WEIGHT: 212 LBS | SYSTOLIC BLOOD PRESSURE: 149 MMHG | DIASTOLIC BLOOD PRESSURE: 81 MMHG | RESPIRATION RATE: 16 BRPM | BODY MASS INDEX: 34.07 KG/M2 | OXYGEN SATURATION: 96 % | HEIGHT: 66 IN | TEMPERATURE: 99 F | HEART RATE: 72 BPM

## 2021-02-15 DIAGNOSIS — J02.9 ACUTE PHARYNGITIS, UNSPECIFIED ETIOLOGY: ICD-10-CM

## 2021-02-15 DIAGNOSIS — Z20.822 ENCOUNTER FOR LABORATORY TESTING FOR COVID-19 VIRUS: Primary | ICD-10-CM

## 2021-02-15 DIAGNOSIS — J01.40 ACUTE NON-RECURRENT PANSINUSITIS: ICD-10-CM

## 2021-02-15 LAB
CTP QC/QA: YES
SARS-COV-2 RDRP RESP QL NAA+PROBE: NEGATIVE

## 2021-02-15 PROCEDURE — U0002: ICD-10-PCS | Mod: QW,S$GLB,, | Performed by: FAMILY MEDICINE

## 2021-02-15 PROCEDURE — 99214 OFFICE O/P EST MOD 30 MIN: CPT | Mod: 25,S$GLB,, | Performed by: FAMILY MEDICINE

## 2021-02-15 PROCEDURE — 99214 PR OFFICE/OUTPT VISIT, EST, LEVL IV, 30-39 MIN: ICD-10-PCS | Mod: 25,S$GLB,, | Performed by: FAMILY MEDICINE

## 2021-02-15 PROCEDURE — 96372 PR INJECTION,THERAP/PROPH/DIAG2ST, IM OR SUBCUT: ICD-10-PCS | Mod: S$GLB,,, | Performed by: FAMILY MEDICINE

## 2021-02-15 PROCEDURE — 96372 THER/PROPH/DIAG INJ SC/IM: CPT | Mod: S$GLB,,, | Performed by: FAMILY MEDICINE

## 2021-02-15 PROCEDURE — U0002 COVID-19 LAB TEST NON-CDC: HCPCS | Mod: QW,S$GLB,, | Performed by: FAMILY MEDICINE

## 2021-02-15 RX ORDER — DEXAMETHASONE SODIUM PHOSPHATE 100 MG/10ML
5 INJECTION INTRAMUSCULAR; INTRAVENOUS
Status: COMPLETED | OUTPATIENT
Start: 2021-02-15 | End: 2021-02-15

## 2021-02-15 RX ORDER — DEXCHLORPHENIRAMINE MALEATE AND PSEUDOEPHEDRINE HYDROCHLORIDE 2; 60 MG/1; MG/1
1 TABLET, MULTILAYER ORAL 2 TIMES DAILY PRN
Qty: 20 TABLET | Refills: 0 | Status: SHIPPED | OUTPATIENT
Start: 2021-02-15 | End: 2021-02-26

## 2021-02-15 RX ORDER — CEFDINIR 300 MG/1
300 CAPSULE ORAL 2 TIMES DAILY
Qty: 20 CAPSULE | Refills: 0 | Status: SHIPPED | OUTPATIENT
Start: 2021-02-15 | End: 2021-02-26

## 2021-02-15 RX ORDER — NAPROXEN 500 MG/1
500 TABLET ORAL 2 TIMES DAILY WITH MEALS
Qty: 20 TABLET | Refills: 0 | Status: SHIPPED | OUTPATIENT
Start: 2021-02-15 | End: 2023-02-20

## 2021-02-15 RX ADMIN — DEXAMETHASONE SODIUM PHOSPHATE 5 MG: 100 INJECTION INTRAMUSCULAR; INTRAVENOUS at 11:02

## 2021-02-26 ENCOUNTER — OFFICE VISIT (OUTPATIENT)
Dept: INTERNAL MEDICINE | Facility: CLINIC | Age: 56
End: 2021-02-26
Payer: COMMERCIAL

## 2021-02-26 ENCOUNTER — LAB VISIT (OUTPATIENT)
Dept: LAB | Facility: HOSPITAL | Age: 56
End: 2021-02-26
Attending: FAMILY MEDICINE
Payer: COMMERCIAL

## 2021-02-26 VITALS
SYSTOLIC BLOOD PRESSURE: 130 MMHG | HEART RATE: 88 BPM | DIASTOLIC BLOOD PRESSURE: 86 MMHG | RESPIRATION RATE: 16 BRPM | WEIGHT: 223.13 LBS | TEMPERATURE: 97 F | HEIGHT: 66 IN | BODY MASS INDEX: 35.86 KG/M2

## 2021-02-26 DIAGNOSIS — I10 ESSENTIAL HYPERTENSION: ICD-10-CM

## 2021-02-26 DIAGNOSIS — M50.00 CERVICAL DISC DISEASE WITH MYELOPATHY: ICD-10-CM

## 2021-02-26 DIAGNOSIS — E78.5 HYPERLIPIDEMIA, UNSPECIFIED HYPERLIPIDEMIA TYPE: ICD-10-CM

## 2021-02-26 DIAGNOSIS — G47.33 OSA (OBSTRUCTIVE SLEEP APNEA): ICD-10-CM

## 2021-02-26 DIAGNOSIS — G47.10 PERSISTENT DISORDER OF INITIATING OR MAINTAINING WAKEFULNESS: ICD-10-CM

## 2021-02-26 DIAGNOSIS — H50.22 HYPERTROPIA OF LEFT EYE: ICD-10-CM

## 2021-02-26 DIAGNOSIS — Z12.11 SCREENING FOR COLON CANCER: Primary | ICD-10-CM

## 2021-02-26 LAB
ALBUMIN SERPL BCP-MCNC: 4.3 G/DL (ref 3.5–5.2)
ALP SERPL-CCNC: 52 U/L (ref 55–135)
ALT SERPL W/O P-5'-P-CCNC: 28 U/L (ref 10–44)
ANION GAP SERPL CALC-SCNC: 10 MMOL/L (ref 8–16)
AST SERPL-CCNC: 20 U/L (ref 10–40)
BILIRUB SERPL-MCNC: 0.5 MG/DL (ref 0.1–1)
BUN SERPL-MCNC: 14 MG/DL (ref 6–20)
CALCIUM SERPL-MCNC: 10 MG/DL (ref 8.7–10.5)
CHLORIDE SERPL-SCNC: 103 MMOL/L (ref 95–110)
CHOLEST SERPL-MCNC: 183 MG/DL (ref 120–199)
CHOLEST/HDLC SERPL: 7 {RATIO} (ref 2–5)
CO2 SERPL-SCNC: 28 MMOL/L (ref 23–29)
CREAT SERPL-MCNC: 1.1 MG/DL (ref 0.5–1.4)
EST. GFR  (AFRICAN AMERICAN): >60 ML/MIN/1.73 M^2
EST. GFR  (NON AFRICAN AMERICAN): >60 ML/MIN/1.73 M^2
GLUCOSE SERPL-MCNC: 88 MG/DL (ref 70–110)
HDLC SERPL-MCNC: 26 MG/DL (ref 40–75)
HDLC SERPL: 14.2 % (ref 20–50)
LDLC SERPL CALC-MCNC: 85.8 MG/DL (ref 63–159)
NONHDLC SERPL-MCNC: 157 MG/DL
POTASSIUM SERPL-SCNC: 4.4 MMOL/L (ref 3.5–5.1)
PROT SERPL-MCNC: 7.7 G/DL (ref 6–8.4)
SODIUM SERPL-SCNC: 141 MMOL/L (ref 136–145)
TRIGL SERPL-MCNC: 356 MG/DL (ref 30–150)

## 2021-02-26 PROCEDURE — 99214 PR OFFICE/OUTPT VISIT, EST, LEVL IV, 30-39 MIN: ICD-10-PCS | Mod: S$GLB,,, | Performed by: FAMILY MEDICINE

## 2021-02-26 PROCEDURE — 99999 PR PBB SHADOW E&M-EST. PATIENT-LVL III: CPT | Mod: PBBFAC,,, | Performed by: FAMILY MEDICINE

## 2021-02-26 PROCEDURE — 99999 PR PBB SHADOW E&M-EST. PATIENT-LVL III: ICD-10-PCS | Mod: PBBFAC,,, | Performed by: FAMILY MEDICINE

## 2021-02-26 PROCEDURE — 80053 COMPREHEN METABOLIC PANEL: CPT

## 2021-02-26 PROCEDURE — 36415 COLL VENOUS BLD VENIPUNCTURE: CPT

## 2021-02-26 PROCEDURE — 99214 OFFICE O/P EST MOD 30 MIN: CPT | Mod: S$GLB,,, | Performed by: FAMILY MEDICINE

## 2021-02-26 PROCEDURE — 80061 LIPID PANEL: CPT

## 2021-02-26 RX ORDER — FENOFIBRIC ACID 135 MG/1
1 CAPSULE, DELAYED RELEASE ORAL DAILY
Qty: 30 CAPSULE | Refills: 5 | Status: SHIPPED | OUTPATIENT
Start: 2021-02-26 | End: 2021-08-23 | Stop reason: SDUPTHER

## 2021-02-26 RX ORDER — PROPRANOLOL HYDROCHLORIDE 80 MG/1
80 CAPSULE, EXTENDED RELEASE ORAL DAILY
Qty: 30 CAPSULE | Refills: 5 | Status: SHIPPED | OUTPATIENT
Start: 2021-02-26 | End: 2021-08-23 | Stop reason: SDUPTHER

## 2021-02-26 RX ORDER — IRBESARTAN 300 MG/1
300 TABLET ORAL DAILY
Qty: 30 TABLET | Refills: 5 | Status: SHIPPED | OUTPATIENT
Start: 2021-02-26 | End: 2021-08-23 | Stop reason: SDUPTHER

## 2021-02-26 RX ORDER — ATORVASTATIN CALCIUM 20 MG/1
20 TABLET, FILM COATED ORAL DAILY
Qty: 30 TABLET | Refills: 5 | Status: SHIPPED | OUTPATIENT
Start: 2021-02-26 | End: 2021-08-23 | Stop reason: SDUPTHER

## 2021-03-01 ENCOUNTER — TELEPHONE (OUTPATIENT)
Dept: FAMILY MEDICINE | Facility: CLINIC | Age: 56
End: 2021-03-01

## 2021-04-30 ENCOUNTER — HOSPITAL ENCOUNTER (OUTPATIENT)
Dept: PREADMISSION TESTING | Facility: HOSPITAL | Age: 56
Discharge: HOME | End: 2021-04-30
Attending: FAMILY MEDICINE
Payer: COMMERCIAL

## 2021-04-30 DIAGNOSIS — Z01.818 PRE-OP TESTING: ICD-10-CM

## 2021-04-30 LAB — SARS-COV-2 RNA RESP QL NAA+PROBE: NOT DETECTED

## 2021-04-30 PROCEDURE — U0003 INFECTIOUS AGENT DETECTION BY NUCLEIC ACID (DNA OR RNA); SEVERE ACUTE RESPIRATORY SYNDROME CORONAVIRUS 2 (SARS-COV-2) (CORONAVIRUS DISEASE [COVID-19]), AMPLIFIED PROBE TECHNIQUE, MAKING USE OF HIGH THROUGHPUT TECHNOLOGIES AS DESCRIBED BY CMS-2020-01-R: HCPCS | Performed by: FAMILY MEDICINE

## 2021-04-30 PROCEDURE — U0005 INFEC AGEN DETEC AMPLI PROBE: HCPCS | Performed by: FAMILY MEDICINE

## 2021-05-02 PROBLEM — Z12.11 SCREEN FOR COLON CANCER: Status: ACTIVE | Noted: 2018-01-31

## 2021-05-03 ENCOUNTER — ANESTHESIA (OUTPATIENT)
Dept: ENDOSCOPY | Facility: HOSPITAL | Age: 56
End: 2021-05-03
Payer: COMMERCIAL

## 2021-05-03 ENCOUNTER — ANESTHESIA EVENT (OUTPATIENT)
Dept: ENDOSCOPY | Facility: HOSPITAL | Age: 56
End: 2021-05-03
Payer: COMMERCIAL

## 2021-05-03 ENCOUNTER — HOSPITAL ENCOUNTER (OUTPATIENT)
Facility: HOSPITAL | Age: 56
Discharge: HOME | End: 2021-05-03
Attending: FAMILY MEDICINE | Admitting: FAMILY MEDICINE
Payer: COMMERCIAL

## 2021-05-03 VITALS
HEART RATE: 71 BPM | OXYGEN SATURATION: 96 % | SYSTOLIC BLOOD PRESSURE: 146 MMHG | TEMPERATURE: 97 F | RESPIRATION RATE: 18 BRPM | DIASTOLIC BLOOD PRESSURE: 87 MMHG

## 2021-05-03 DIAGNOSIS — Z12.11 SCREEN FOR COLON CANCER: ICD-10-CM

## 2021-05-03 DIAGNOSIS — Z01.818 PRE-OP TESTING: Primary | ICD-10-CM

## 2021-05-03 PROCEDURE — 63600175 PHARM REV CODE 636 W HCPCS: Performed by: FAMILY MEDICINE

## 2021-05-03 PROCEDURE — 37000009 HC ANESTHESIA EA ADD 15 MINS: Performed by: FAMILY MEDICINE

## 2021-05-03 PROCEDURE — 00812 ANES LWR INTST SCR COLSC: CPT | Mod: QZ,P3 | Performed by: NURSE ANESTHETIST, CERTIFIED REGISTERED

## 2021-05-03 PROCEDURE — 25000003 PHARM REV CODE 250: Performed by: NURSE ANESTHETIST, CERTIFIED REGISTERED

## 2021-05-03 PROCEDURE — G0121 COLON CA SCRN NOT HI RSK IND: HCPCS | Performed by: FAMILY MEDICINE

## 2021-05-03 PROCEDURE — 63600175 PHARM REV CODE 636 W HCPCS: Performed by: NURSE ANESTHETIST, CERTIFIED REGISTERED

## 2021-05-03 PROCEDURE — 45378 PR COLONOSCOPY,DIAGNOSTIC: ICD-10-PCS | Mod: ,,, | Performed by: FAMILY MEDICINE

## 2021-05-03 PROCEDURE — 37000008 HC ANESTHESIA 1ST 15 MINUTES: Performed by: FAMILY MEDICINE

## 2021-05-03 PROCEDURE — 45378 DIAGNOSTIC COLONOSCOPY: CPT | Mod: ,,, | Performed by: FAMILY MEDICINE

## 2021-05-03 RX ORDER — SODIUM CHLORIDE 0.9 % (FLUSH) 0.9 %
10 SYRINGE (ML) INJECTION
Status: DISCONTINUED | OUTPATIENT
Start: 2021-05-03 | End: 2021-05-03 | Stop reason: HOSPADM

## 2021-05-03 RX ORDER — PROPOFOL 10 MG/ML
INJECTION, EMULSION INTRAVENOUS
Status: DISCONTINUED | OUTPATIENT
Start: 2021-05-03 | End: 2021-05-03

## 2021-05-03 RX ORDER — LIDOCAINE HCL/PF 100 MG/5ML
SYRINGE (ML) INTRAVENOUS
Status: DISCONTINUED | OUTPATIENT
Start: 2021-05-03 | End: 2021-05-03

## 2021-05-03 RX ORDER — SODIUM CHLORIDE, SODIUM LACTATE, POTASSIUM CHLORIDE, CALCIUM CHLORIDE 600; 310; 30; 20 MG/100ML; MG/100ML; MG/100ML; MG/100ML
INJECTION, SOLUTION INTRAVENOUS CONTINUOUS
Status: DISCONTINUED | OUTPATIENT
Start: 2021-05-03 | End: 2021-05-03 | Stop reason: HOSPADM

## 2021-05-03 RX ORDER — SUCCINYLCHOLINE CHLORIDE 20 MG/ML
INJECTION INTRAMUSCULAR; INTRAVENOUS
Status: DISCONTINUED
Start: 2021-05-03 | End: 2021-05-03 | Stop reason: WASHOUT

## 2021-05-03 RX ADMIN — LIDOCAINE HYDROCHLORIDE 50 MG: 20 INJECTION, SOLUTION INTRAVENOUS at 07:05

## 2021-05-03 RX ADMIN — SODIUM CHLORIDE, SODIUM LACTATE, POTASSIUM CHLORIDE, AND CALCIUM CHLORIDE: .6; .31; .03; .02 INJECTION, SOLUTION INTRAVENOUS at 07:05

## 2021-05-03 RX ADMIN — PROPOFOL 150 MG: 10 INJECTION, EMULSION INTRAVENOUS at 07:05

## 2021-05-03 RX ADMIN — PROPOFOL 50 MG: 10 INJECTION, EMULSION INTRAVENOUS at 07:05

## 2021-08-23 ENCOUNTER — CLINICAL SUPPORT (OUTPATIENT)
Dept: FAMILY MEDICINE | Facility: CLINIC | Age: 56
End: 2021-08-23
Payer: COMMERCIAL

## 2021-08-23 ENCOUNTER — OFFICE VISIT (OUTPATIENT)
Dept: FAMILY MEDICINE | Facility: CLINIC | Age: 56
End: 2021-08-23
Payer: COMMERCIAL

## 2021-08-23 VITALS
DIASTOLIC BLOOD PRESSURE: 72 MMHG | WEIGHT: 217.38 LBS | OXYGEN SATURATION: 100 % | HEIGHT: 66 IN | HEART RATE: 82 BPM | SYSTOLIC BLOOD PRESSURE: 116 MMHG | RESPIRATION RATE: 12 BRPM | BODY MASS INDEX: 34.93 KG/M2

## 2021-08-23 DIAGNOSIS — E78.5 HYPERLIPIDEMIA, UNSPECIFIED HYPERLIPIDEMIA TYPE: ICD-10-CM

## 2021-08-23 DIAGNOSIS — I10 ESSENTIAL HYPERTENSION: ICD-10-CM

## 2021-08-23 DIAGNOSIS — G47.33 OSA (OBSTRUCTIVE SLEEP APNEA): ICD-10-CM

## 2021-08-23 DIAGNOSIS — I10 ESSENTIAL HYPERTENSION: Primary | ICD-10-CM

## 2021-08-23 DIAGNOSIS — B07.9 VIRAL WARTS, UNSPECIFIED TYPE: ICD-10-CM

## 2021-08-23 DIAGNOSIS — Z11.59 NEED FOR HEPATITIS C SCREENING TEST: ICD-10-CM

## 2021-08-23 DIAGNOSIS — M50.00 CERVICAL DISC DISEASE WITH MYELOPATHY: ICD-10-CM

## 2021-08-23 LAB
ALBUMIN SERPL BCP-MCNC: 4.4 G/DL (ref 3.5–5.2)
ALP SERPL-CCNC: 49 U/L (ref 55–135)
ALT SERPL W/O P-5'-P-CCNC: 33 U/L (ref 10–44)
ANION GAP SERPL CALC-SCNC: 15 MMOL/L (ref 8–16)
AST SERPL-CCNC: 27 U/L (ref 10–40)
BILIRUB SERPL-MCNC: 0.7 MG/DL (ref 0.1–1)
BUN SERPL-MCNC: 28 MG/DL (ref 6–20)
CALCIUM SERPL-MCNC: 11 MG/DL (ref 8.7–10.5)
CHLORIDE SERPL-SCNC: 102 MMOL/L (ref 95–110)
CHOLEST SERPL-MCNC: 159 MG/DL (ref 120–199)
CHOLEST/HDLC SERPL: 5.1 {RATIO} (ref 2–5)
CO2 SERPL-SCNC: 23 MMOL/L (ref 23–29)
CREAT SERPL-MCNC: 1.8 MG/DL (ref 0.5–1.4)
EST. GFR  (AFRICAN AMERICAN): 48 ML/MIN/1.73 M^2
EST. GFR  (NON AFRICAN AMERICAN): 42 ML/MIN/1.73 M^2
GLUCOSE SERPL-MCNC: 94 MG/DL (ref 70–110)
HDLC SERPL-MCNC: 31 MG/DL (ref 40–75)
HDLC SERPL: 19.5 % (ref 20–50)
LDLC SERPL CALC-MCNC: 62 MG/DL (ref 63–159)
NONHDLC SERPL-MCNC: 128 MG/DL
POTASSIUM SERPL-SCNC: 4.1 MMOL/L (ref 3.5–5.1)
PROT SERPL-MCNC: 8.2 G/DL (ref 6–8.4)
SODIUM SERPL-SCNC: 140 MMOL/L (ref 136–145)
TRIGL SERPL-MCNC: 330 MG/DL (ref 30–150)

## 2021-08-23 PROCEDURE — 99999 PR PBB SHADOW E&M-EST. PATIENT-LVL III: CPT | Mod: PBBFAC,,, | Performed by: FAMILY MEDICINE

## 2021-08-23 PROCEDURE — 99214 OFFICE O/P EST MOD 30 MIN: CPT | Mod: S$GLB,,, | Performed by: FAMILY MEDICINE

## 2021-08-23 PROCEDURE — 80053 COMPREHEN METABOLIC PANEL: CPT | Performed by: FAMILY MEDICINE

## 2021-08-23 PROCEDURE — 36415 PR COLLECTION VENOUS BLOOD,VENIPUNCTURE: ICD-10-PCS | Mod: S$GLB,,, | Performed by: FAMILY MEDICINE

## 2021-08-23 PROCEDURE — 86803 HEPATITIS C AB TEST: CPT | Performed by: FAMILY MEDICINE

## 2021-08-23 PROCEDURE — 99999 PR PBB SHADOW E&M-EST. PATIENT-LVL III: ICD-10-PCS | Mod: PBBFAC,,, | Performed by: FAMILY MEDICINE

## 2021-08-23 PROCEDURE — 36415 COLL VENOUS BLD VENIPUNCTURE: CPT | Mod: S$GLB,,, | Performed by: FAMILY MEDICINE

## 2021-08-23 PROCEDURE — 80061 LIPID PANEL: CPT | Performed by: FAMILY MEDICINE

## 2021-08-23 PROCEDURE — 99214 PR OFFICE/OUTPT VISIT, EST, LEVL IV, 30-39 MIN: ICD-10-PCS | Mod: S$GLB,,, | Performed by: FAMILY MEDICINE

## 2021-08-23 RX ORDER — IRBESARTAN 300 MG/1
300 TABLET ORAL DAILY
Qty: 30 TABLET | Refills: 5 | Status: SHIPPED | OUTPATIENT
Start: 2021-08-23 | End: 2021-08-23 | Stop reason: SDUPTHER

## 2021-08-23 RX ORDER — PROPRANOLOL HYDROCHLORIDE 80 MG/1
80 CAPSULE, EXTENDED RELEASE ORAL DAILY
Qty: 30 CAPSULE | Refills: 5 | Status: SHIPPED | OUTPATIENT
Start: 2021-08-23 | End: 2021-08-23 | Stop reason: SDUPTHER

## 2021-08-23 RX ORDER — ATORVASTATIN CALCIUM 20 MG/1
20 TABLET, FILM COATED ORAL DAILY
Qty: 30 TABLET | Refills: 5 | Status: SHIPPED | OUTPATIENT
Start: 2021-08-23 | End: 2021-08-23 | Stop reason: SDUPTHER

## 2021-08-23 RX ORDER — FENOFIBRIC ACID 135 MG/1
1 CAPSULE, DELAYED RELEASE ORAL DAILY
Qty: 30 CAPSULE | Refills: 5 | Status: SHIPPED | OUTPATIENT
Start: 2021-08-23 | End: 2021-08-23 | Stop reason: SDUPTHER

## 2021-08-24 LAB — HCV AB SERPL QL IA: NEGATIVE

## 2021-08-25 RX ORDER — IRBESARTAN 300 MG/1
300 TABLET ORAL DAILY
Qty: 30 TABLET | Refills: 5 | Status: SHIPPED | OUTPATIENT
Start: 2021-08-25 | End: 2022-02-21 | Stop reason: SDUPTHER

## 2021-08-25 RX ORDER — PROPRANOLOL HYDROCHLORIDE 80 MG/1
80 CAPSULE, EXTENDED RELEASE ORAL DAILY
Qty: 30 CAPSULE | Refills: 5 | Status: SHIPPED | OUTPATIENT
Start: 2021-08-25 | End: 2022-02-21 | Stop reason: SDUPTHER

## 2021-08-25 RX ORDER — ATORVASTATIN CALCIUM 20 MG/1
20 TABLET, FILM COATED ORAL DAILY
Qty: 30 TABLET | Refills: 5 | Status: SHIPPED | OUTPATIENT
Start: 2021-08-25 | End: 2022-02-21 | Stop reason: SDUPTHER

## 2021-08-25 RX ORDER — FENOFIBRIC ACID 135 MG/1
1 CAPSULE, DELAYED RELEASE ORAL DAILY
Qty: 30 CAPSULE | Refills: 5 | Status: SHIPPED | OUTPATIENT
Start: 2021-08-25 | End: 2022-02-21 | Stop reason: SDUPTHER

## 2021-11-19 ENCOUNTER — IMMUNIZATION (OUTPATIENT)
Dept: FAMILY MEDICINE | Facility: CLINIC | Age: 56
End: 2021-11-19
Payer: COMMERCIAL

## 2021-11-19 DIAGNOSIS — Z23 NEED FOR VACCINATION: Primary | ICD-10-CM

## 2021-11-19 PROCEDURE — 0004A COVID-19, MRNA, LNP-S, PF, 30 MCG/0.3 ML DOSE VACCINE: CPT | Mod: PBBFAC | Performed by: FAMILY MEDICINE

## 2021-11-23 ENCOUNTER — PATIENT OUTREACH (OUTPATIENT)
Dept: ADMINISTRATIVE | Facility: HOSPITAL | Age: 56
End: 2021-11-23
Payer: COMMERCIAL

## 2022-02-21 ENCOUNTER — OFFICE VISIT (OUTPATIENT)
Dept: FAMILY MEDICINE | Facility: CLINIC | Age: 57
End: 2022-02-21
Payer: COMMERCIAL

## 2022-02-21 ENCOUNTER — CLINICAL SUPPORT (OUTPATIENT)
Dept: FAMILY MEDICINE | Facility: CLINIC | Age: 57
End: 2022-02-21
Payer: COMMERCIAL

## 2022-02-21 VITALS
SYSTOLIC BLOOD PRESSURE: 124 MMHG | WEIGHT: 220 LBS | BODY MASS INDEX: 35.36 KG/M2 | HEIGHT: 66 IN | DIASTOLIC BLOOD PRESSURE: 80 MMHG | RESPIRATION RATE: 20 BRPM | HEART RATE: 98 BPM

## 2022-02-21 DIAGNOSIS — E78.5 HYPERLIPIDEMIA, UNSPECIFIED HYPERLIPIDEMIA TYPE: ICD-10-CM

## 2022-02-21 DIAGNOSIS — R79.89 ELEVATED SERUM CREATININE: ICD-10-CM

## 2022-02-21 DIAGNOSIS — G47.33 OSA (OBSTRUCTIVE SLEEP APNEA): ICD-10-CM

## 2022-02-21 DIAGNOSIS — I10 ESSENTIAL HYPERTENSION: ICD-10-CM

## 2022-02-21 DIAGNOSIS — Z23 IMMUNIZATION DUE: ICD-10-CM

## 2022-02-21 DIAGNOSIS — Q28.3 CEREBRAL CAVERNOUS MALFORMATION: Primary | ICD-10-CM

## 2022-02-21 LAB
ALBUMIN SERPL BCP-MCNC: 4.3 G/DL (ref 3.5–5.2)
ALP SERPL-CCNC: 52 U/L (ref 55–135)
ALT SERPL W/O P-5'-P-CCNC: 23 U/L (ref 10–44)
ANION GAP SERPL CALC-SCNC: 13 MMOL/L (ref 8–16)
AST SERPL-CCNC: 22 U/L (ref 10–40)
BILIRUB SERPL-MCNC: 0.3 MG/DL (ref 0.1–1)
BUN SERPL-MCNC: 18 MG/DL (ref 6–20)
CALCIUM SERPL-MCNC: 10.2 MG/DL (ref 8.7–10.5)
CHLORIDE SERPL-SCNC: 105 MMOL/L (ref 95–110)
CHOLEST SERPL-MCNC: 187 MG/DL (ref 120–199)
CHOLEST/HDLC SERPL: 5.7 {RATIO} (ref 2–5)
CO2 SERPL-SCNC: 24 MMOL/L (ref 23–29)
CREAT SERPL-MCNC: 1.3 MG/DL (ref 0.5–1.4)
EST. GFR  (AFRICAN AMERICAN): >60 ML/MIN/1.73 M^2
EST. GFR  (NON AFRICAN AMERICAN): >60 ML/MIN/1.73 M^2
GLUCOSE SERPL-MCNC: 96 MG/DL (ref 70–110)
HDLC SERPL-MCNC: 33 MG/DL (ref 40–75)
HDLC SERPL: 17.6 % (ref 20–50)
LDLC SERPL CALC-MCNC: 92.4 MG/DL (ref 63–159)
NONHDLC SERPL-MCNC: 154 MG/DL
POTASSIUM SERPL-SCNC: 3.8 MMOL/L (ref 3.5–5.1)
PROT SERPL-MCNC: 8 G/DL (ref 6–8.4)
SODIUM SERPL-SCNC: 142 MMOL/L (ref 136–145)
TRIGL SERPL-MCNC: 308 MG/DL (ref 30–150)

## 2022-02-21 PROCEDURE — 36415 COLL VENOUS BLD VENIPUNCTURE: CPT | Mod: S$GLB,,, | Performed by: FAMILY MEDICINE

## 2022-02-21 PROCEDURE — 80061 LIPID PANEL: CPT | Performed by: FAMILY MEDICINE

## 2022-02-21 PROCEDURE — 99999 PR PBB SHADOW E&M-EST. PATIENT-LVL III: CPT | Mod: PBBFAC,,, | Performed by: FAMILY MEDICINE

## 2022-02-21 PROCEDURE — 99214 PR OFFICE/OUTPT VISIT, EST, LEVL IV, 30-39 MIN: ICD-10-PCS | Mod: 25,S$GLB,, | Performed by: FAMILY MEDICINE

## 2022-02-21 PROCEDURE — 90686 FLU VACCINE (QUAD) GREATER THAN OR EQUAL TO 3YO PRESERVATIVE FREE IM: ICD-10-PCS | Mod: S$GLB,,, | Performed by: FAMILY MEDICINE

## 2022-02-21 PROCEDURE — 99214 OFFICE O/P EST MOD 30 MIN: CPT | Mod: 25,S$GLB,, | Performed by: FAMILY MEDICINE

## 2022-02-21 PROCEDURE — 80053 COMPREHEN METABOLIC PANEL: CPT | Performed by: FAMILY MEDICINE

## 2022-02-21 PROCEDURE — 90686 IIV4 VACC NO PRSV 0.5 ML IM: CPT | Mod: S$GLB,,, | Performed by: FAMILY MEDICINE

## 2022-02-21 PROCEDURE — 99999 PR PBB SHADOW E&M-EST. PATIENT-LVL III: ICD-10-PCS | Mod: PBBFAC,,, | Performed by: FAMILY MEDICINE

## 2022-02-21 PROCEDURE — 90471 IMMUNIZATION ADMIN: CPT | Mod: S$GLB,,, | Performed by: FAMILY MEDICINE

## 2022-02-21 PROCEDURE — 90471 FLU VACCINE (QUAD) GREATER THAN OR EQUAL TO 3YO PRESERVATIVE FREE IM: ICD-10-PCS | Mod: S$GLB,,, | Performed by: FAMILY MEDICINE

## 2022-02-21 PROCEDURE — 36415 PR COLLECTION VENOUS BLOOD,VENIPUNCTURE: ICD-10-PCS | Mod: S$GLB,,, | Performed by: FAMILY MEDICINE

## 2022-02-21 RX ORDER — IRBESARTAN 300 MG/1
300 TABLET ORAL DAILY
Qty: 30 TABLET | Refills: 5 | Status: SHIPPED | OUTPATIENT
Start: 2022-02-21 | End: 2022-08-22 | Stop reason: SDUPTHER

## 2022-02-21 RX ORDER — ATORVASTATIN CALCIUM 20 MG/1
20 TABLET, FILM COATED ORAL DAILY
Qty: 30 TABLET | Refills: 5 | Status: SHIPPED | OUTPATIENT
Start: 2022-02-21 | End: 2022-08-22 | Stop reason: SDUPTHER

## 2022-02-21 RX ORDER — PROPRANOLOL HYDROCHLORIDE 80 MG/1
80 CAPSULE, EXTENDED RELEASE ORAL DAILY
Qty: 30 CAPSULE | Refills: 5 | Status: SHIPPED | OUTPATIENT
Start: 2022-02-21 | End: 2022-08-22 | Stop reason: SDUPTHER

## 2022-02-21 RX ORDER — FENOFIBRIC ACID 135 MG/1
1 CAPSULE, DELAYED RELEASE ORAL DAILY
Qty: 30 CAPSULE | Refills: 5 | Status: SHIPPED | OUTPATIENT
Start: 2022-02-21 | End: 2022-08-22 | Stop reason: SDUPTHER

## 2022-02-21 NOTE — PROGRESS NOTES
Pt is a 57 y.o. male who presents for check up for   Encounter Diagnoses   Name Primary?    Hyperlipidemia     Hypertension    . Doing well on current meds. Denies any side effects. Prevention is up to date.  He refuses the flu shot    HPI: White male here for checkup today.   His blood pressure is running well. He denies side effects such as dry cough, lightheadedness, fatigue. His cholesterol medications were well tolerated. He does not have any side effects such as myalgias. He denies fever chills weight loss or weight gain. He denies chest pain.  Patient recently had blood work done through work.  Lab results were reviewed.  This will be scanned into the computer.  Neck pain under control.   History of cervical fusion.  Gets paresthesia in right hand. pain comes and goes.  Very painful when he lies down.  He is getting worse with right side weakness.  He was diagnosed with a cavernous hemangioma in the brain.  In 2017 he underwent brain surgery to remove it.  Has  Doing remarkably well.  His speech has improved nicely.  He is no longer having double vision.  He is starting to play golf.  He is having loud snoring and daytime sleepiness.  Has fitful sleep.  Now on CPAP at 19.  Not very compliant.    ROS:   Gen.: No fever, chills, weight loss, weight gain  HEENT: No headaches, sinus congestion, sore throat, change in vision  CV: No chest pain  RESP: No shortness of breath, wheezing, cough  GI: No change in bowel habits, no diarrhea, no abdominal pain, no hematochezia  : No dysuria, frequency  MSK: Occasional left hip pain, neck pain, right arm weakness  NEURO: No numbness, weakness  ENDO: No polyuria, polydipsia, heat or cold intolerance    PMH, PSH, ALLERGIES, SH, FH reviewed in office note on 4/10/11  Medications reconciled in the nurse's notes    PHYSICAL EXAM;   Vitals:    02/21/22 1302   BP: 124/80   Pulse: 98   Resp: 20     APPEARANCE: Well nourished, well developed, in no acute distress.   HEAD:  Normocephalic, atraumatic.  EYES: PERRL. EOMI.   EARS: TM's intact. Light reflex normal. No retraction or perforation.  Very hard of hearing   NOSE: Mucosa pink. Airway clear.  MOUTH & THROAT: No tonsillar enlargement. No pharyngeal erythema or exudate. No stridor.  NECK: Supple.  No carotid bruits.  No JVD   CHEST: Lungs clear to auscultation.  CARDIOVASCULAR: Normal S1, S2. No rubs, murmurs or gallops.  MUSCULOSKELETAL: No clubbing cyanosis or edema  SKIN:  1 skin tag, 2 moles consistent with seborrheic keratoses present.  These were frozen with cryo  :  High riding testicles.  Able to easily palpate the Vas Deferens  NEUROLOGIC:    Cranial Nerves: III-XII grossly intact. still suffers with double vision    Motor: No focal deficits   MENTAL STATUS: Normal orientation to person, place and time, normal affect, normal flow thought, normal memory.    Lab Results   Component Value Date    LDLCALC 92.4 02/21/2022   Trig 339  HDL 27  LDLc 119    BMP  Lab Results   Component Value Date     02/21/2022    K 3.8 02/21/2022     02/21/2022    CO2 24 02/21/2022    BUN 18 02/21/2022    CREATININE 1.3 02/21/2022    CALCIUM 10.2 02/21/2022    ANIONGAP 13 02/21/2022    ESTGFRAFRICA >60 02/21/2022    EGFRNONAA >60 02/21/2022     ASSESSMENT/PLAN:    HTN -   Encouraged patient to avoid table salt and try to follow a 2 g sodium diet  Continue propranolol 80 mg daily  Continue Irbisartan to 300 mg daily  Walk 20 minutes per day.  Try to lose weight.    Dyslipidemia-  Low fat diet. Avoid sweets. A 10 pound weight loss by the next visit as a  good goal. Increase consumption of fruits and vegetables, fish and chicken.  Continue Lipitor 20 mg daily, Trilipix 135 mg p.o. daily.    S/P resection of Cerebral cavernous malformation  Doing well.  No longer requires neurosurgical care.    Cervical disc disease with myelopathy  Follow-up with neurosurgery.      NIA (obstructive sleep apnea)  Advised him to continue CPAP at 19 cm  pressure    Cerebral cavernous malformation  S/P resection with residual speech and motor issues    Hyperlipidemia, unspecified hyperlipidemia type  -     atorvastatin (LIPITOR) 20 MG tablet; Take 1 tablet (20 mg total) by mouth once daily.  Dispense: 30 tablet; Refill: 5  -     fenofibric acid (FIBRICOR) 135 mg CpDR; Take 1 capsule (135 mg total) by mouth once daily.  Dispense: 30 capsule; Refill: 5  -     Lipid Panel; Future; Expected date: 02/21/2022    Essential hypertension  -     irbesartan (AVAPRO) 300 MG tablet; Take 1 tablet (300 mg total) by mouth once daily.  Dispense: 30 tablet; Refill: 5  -     propranoloL (INDERAL LA) 80 MG 24 hr capsule; Take 1 capsule (80 mg total) by mouth once daily.  Dispense: 30 capsule; Refill: 5  -     Comprehensive Metabolic Panel; Future; Expected date: 02/21/2022    Elevated serum creatinine  Now back to normal.  Monitor  Drink more fluids    Immunization due  -     Influenza - Quadrivalent *Preferred* (6 months+) (PF)    Next appointment will be in 6 months.

## 2022-02-22 ENCOUNTER — TELEPHONE (OUTPATIENT)
Dept: FAMILY MEDICINE | Facility: CLINIC | Age: 57
End: 2022-02-22
Payer: COMMERCIAL

## 2022-02-22 NOTE — TELEPHONE ENCOUNTER
----- Message from Payal Metzger sent at 2022  3:24 PM CST -----  Contact: self  Joel Deluca  MRN: [id number]  : 1965  PCP: Otis Martinez  Home Phone      [phone]  Work Phone      Not on file.  Mobile          [phone]      MESSAGE:   Pt returning phone call.  May be about labs  Does not know who called, no note in chart.    [phone]

## 2022-08-22 ENCOUNTER — OFFICE VISIT (OUTPATIENT)
Dept: FAMILY MEDICINE | Facility: CLINIC | Age: 57
End: 2022-08-22
Payer: COMMERCIAL

## 2022-08-22 VITALS
RESPIRATION RATE: 20 BRPM | BODY MASS INDEX: 33.48 KG/M2 | SYSTOLIC BLOOD PRESSURE: 108 MMHG | HEIGHT: 66 IN | DIASTOLIC BLOOD PRESSURE: 76 MMHG | WEIGHT: 208.31 LBS | HEART RATE: 72 BPM

## 2022-08-22 DIAGNOSIS — E78.2 MIXED HYPERLIPIDEMIA: ICD-10-CM

## 2022-08-22 DIAGNOSIS — I10 ESSENTIAL HYPERTENSION: Primary | ICD-10-CM

## 2022-08-22 DIAGNOSIS — G47.33 OSA (OBSTRUCTIVE SLEEP APNEA): ICD-10-CM

## 2022-08-22 DIAGNOSIS — Q28.3 CAVERNOUS MALFORMATION: ICD-10-CM

## 2022-08-22 DIAGNOSIS — M50.00 CERVICAL DISC DISEASE WITH MYELOPATHY: ICD-10-CM

## 2022-08-22 DIAGNOSIS — E78.5 HYPERLIPIDEMIA, UNSPECIFIED HYPERLIPIDEMIA TYPE: ICD-10-CM

## 2022-08-22 PROBLEM — Z01.818 PRE-OP TESTING: Status: RESOLVED | Noted: 2021-05-03 | Resolved: 2022-08-22

## 2022-08-22 PROCEDURE — 99214 PR OFFICE/OUTPT VISIT, EST, LEVL IV, 30-39 MIN: ICD-10-PCS | Mod: S$GLB,,, | Performed by: FAMILY MEDICINE

## 2022-08-22 PROCEDURE — 99999 PR PBB SHADOW E&M-EST. PATIENT-LVL III: ICD-10-PCS | Mod: PBBFAC,,, | Performed by: FAMILY MEDICINE

## 2022-08-22 PROCEDURE — 99999 PR PBB SHADOW E&M-EST. PATIENT-LVL III: CPT | Mod: PBBFAC,,, | Performed by: FAMILY MEDICINE

## 2022-08-22 PROCEDURE — 99214 OFFICE O/P EST MOD 30 MIN: CPT | Mod: S$GLB,,, | Performed by: FAMILY MEDICINE

## 2022-08-22 RX ORDER — IRBESARTAN 300 MG/1
300 TABLET ORAL DAILY
Qty: 30 TABLET | Refills: 5 | Status: SHIPPED | OUTPATIENT
Start: 2022-08-22 | End: 2023-02-20 | Stop reason: SDUPTHER

## 2022-08-22 RX ORDER — ATORVASTATIN CALCIUM 20 MG/1
20 TABLET, FILM COATED ORAL DAILY
Qty: 30 TABLET | Refills: 5 | Status: SHIPPED | OUTPATIENT
Start: 2022-08-22 | End: 2023-02-20 | Stop reason: SDUPTHER

## 2022-08-22 RX ORDER — FENOFIBRIC ACID 135 MG/1
1 CAPSULE, DELAYED RELEASE ORAL DAILY
Qty: 30 CAPSULE | Refills: 5 | Status: SHIPPED | OUTPATIENT
Start: 2022-08-22 | End: 2023-02-20 | Stop reason: SDUPTHER

## 2022-08-22 RX ORDER — PROPRANOLOL HYDROCHLORIDE 80 MG/1
80 CAPSULE, EXTENDED RELEASE ORAL DAILY
Qty: 30 CAPSULE | Refills: 5 | Status: SHIPPED | OUTPATIENT
Start: 2022-08-22 | End: 2023-02-20 | Stop reason: SDUPTHER

## 2022-08-22 NOTE — PROGRESS NOTES
Pt is a 57 y.o. male who presents for check up for   Encounter Diagnoses   Name Primary?    Hyperlipidemia     Hypertension    . Doing well on current meds. Denies any side effects. Prevention is up to date.  He refuses the flu shot    HPI: White male here for checkup today.   His blood pressure is running well. He denies side effects such as dry cough, lightheadedness, fatigue. His cholesterol medications were well tolerated. He does not have any side effects such as myalgias. He denies fever chills weight loss or weight gain. He denies chest pain.  Patient recently had blood work done through work.  Lab results were reviewed.  This will be scanned into the computer.  Neck pain under control.   History of cervical fusion.  Gets paresthesia in right hand. pain comes and goes.  Overall he is much better.  He was diagnosed with a cavernous hemangioma in the brain.  In 2017 he underwent brain surgery to remove it.  Has  Doing remarkably well.  His speech has improved nicely.  He is no longer having double vision.    He is having loud snoring and daytime sleepiness.  Has fitful sleep.  Now on CPAP at 19.      ROS:   Gen.: No fever, chills, weight loss, weight gain  HEENT: No headaches, sinus congestion, sore throat, change in vision  CV: No chest pain  RESP: No shortness of breath, wheezing, cough  GI: No change in bowel habits, no diarrhea, no abdominal pain, no hematochezia  : No dysuria, frequency  MSK: Occasional left hip pain, neck pain, right arm weakness  NEURO: No numbness, weakness  ENDO: No polyuria, polydipsia, heat or cold intolerance    PHYSICAL EXAM;   Vitals:    08/22/22 1320   BP: 108/76   Pulse: 72   Resp: 20     APPEARANCE: Well nourished, well developed, in no acute distress.   HEAD: Normocephalic, atraumatic.  EYES: PERRL. EOMI.   EARS: TM's intact. Light reflex normal. No retraction or perforation.  Very hard of hearing   NOSE: Mucosa pink. Airway clear.  MOUTH & THROAT: No tonsillar  enlargement. No pharyngeal erythema or exudate. No stridor.  NECK: Supple.  No carotid bruits.  No JVD   CHEST: Lungs clear to auscultation.  CARDIOVASCULAR: Normal S1, S2. No rubs, murmurs or gallops.  MUSCULOSKELETAL: No clubbing cyanosis or edema  SKIN:  1 skin tag, 2 moles consistent with seborrheic keratoses present.  These were frozen with cryo  :  High riding testicles.  Able to easily palpate the Vas Deferens  NEUROLOGIC:    Cranial Nerves: III-XII grossly intact. still suffers with double vision    Motor: No focal deficits   MENTAL STATUS: Normal orientation to person, place and time, normal affect, normal flow thought, normal memory.    Lab Results   Component Value Date    LDLCALC 92.4 02/21/2022     Lab Results   Component Value Date     02/21/2022    K 3.8 02/21/2022     02/21/2022    CO2 24 02/21/2022    BUN 18 02/21/2022    CREATININE 1.3 02/21/2022    CALCIUM 10.2 02/21/2022    ANIONGAP 13 02/21/2022    ESTGFRAFRICA >60 02/21/2022    EGFRNONAA >60 02/21/2022     ASSESSMENT/PLAN:    HTN -   Encouraged patient to avoid table salt and try to follow a 2 g sodium diet  Continue propranolol 80 mg daily  Continue Irbisartan to 300 mg daily  Walk 20 minutes per day.  Try to lose weight.    Dyslipidemia-  Low fat diet. Avoid sweets. A 10 pound weight loss by the next visit as a  good goal. Increase consumption of fruits and vegetables, fish and chicken.  Continue Lipitor 20 mg daily, Trilipix 135 mg p.o. daily.    S/P resection of Cerebral cavernous malformation  Doing well.  No longer requires neurosurgical care.    Cervical disc disease with myelopathy  Follow-up with neurosurgery.      NIA (obstructive sleep apnea)  Advised him to continue CPAP at 19 cm pressure    Cerebral cavernous malformation  S/P resection with residual speech and motor issues    1. Essential hypertension  Overview:  Encouraged patient to avoid table salt and try to follow a 2 g sodium diet  Continue propranolol 80 mg  daily  Continue Irbisartan to 300 mg daily  Walk 20 minutes per day.  Try to lose weight.    Orders:  -     irbesartan (AVAPRO) 300 MG tablet  -     propranoloL (INDERAL LA) 80 MG 24 hr capsule    2. Cavernous malformation  Overview:  Doing well.  No longer requires neurosurgical care.      3. Mixed hyperlipidemia  Overview:  Low fat diet. Avoid sweets. A 10 pound weight loss by the next visit as a  good goal. Increase consumption of fruits and vegetables, fish and chicken.  Continue Lipitor 20 mg daily, Trilipix 135 mg p.o. daily.    Orders:  -     Comprehensive Metabolic Panel  -     Lipid Panel    4. NIA (obstructive sleep apnea)  Overview:  Advised him to continue CPAP at 19 cm pressure      5. Hyperlipidemia, unspecified hyperlipidemia type  Overview:  Low fat diet. Avoid sweets. A 10 pound weight loss by the next visit as a  good goal. Increase consumption of fruits and vegetables, fish and chicken.  Continue Lipitor 20 mg daily, Trilipix 135 mg p.o. daily.    Orders:  -     atorvastatin (LIPITOR) 20 MG tablet  -     fenofibric acid (FIBRICOR) 135 mg CpDR    6. Cervical disc disease with myelopathy  Overview:  Status post C5-C6 ACDF.  Doing well        Next appointment will be in 6 months.

## 2022-08-23 PROBLEM — I61.3 LEFT-SIDED NONTRAUMATIC INTRACEREBRAL HEMORRHAGE OF BRAINSTEM: Status: RESOLVED | Noted: 2018-05-24 | Resolved: 2022-08-23

## 2022-08-23 PROBLEM — Z98.890 STATUS POST CRANIOTOMY: Status: RESOLVED | Noted: 2018-02-19 | Resolved: 2022-08-23

## 2022-09-08 ENCOUNTER — CLINICAL SUPPORT (OUTPATIENT)
Dept: FAMILY MEDICINE | Facility: CLINIC | Age: 57
End: 2022-09-08
Payer: COMMERCIAL

## 2022-09-08 DIAGNOSIS — E78.2 MIXED HYPERLIPIDEMIA: ICD-10-CM

## 2022-09-08 LAB
ALBUMIN SERPL BCP-MCNC: 4 G/DL (ref 3.5–5.2)
ALP SERPL-CCNC: 57 U/L (ref 55–135)
ALT SERPL W/O P-5'-P-CCNC: 61 U/L (ref 10–44)
ANION GAP SERPL CALC-SCNC: 13 MMOL/L (ref 8–16)
AST SERPL-CCNC: 35 U/L (ref 10–40)
BILIRUB SERPL-MCNC: 0.7 MG/DL (ref 0.1–1)
BUN SERPL-MCNC: 21 MG/DL (ref 6–20)
CALCIUM SERPL-MCNC: 9.4 MG/DL (ref 8.7–10.5)
CHLORIDE SERPL-SCNC: 104 MMOL/L (ref 95–110)
CHOLEST SERPL-MCNC: 134 MG/DL (ref 120–199)
CHOLEST/HDLC SERPL: 7.4 {RATIO} (ref 2–5)
CO2 SERPL-SCNC: 24 MMOL/L (ref 23–29)
CREAT SERPL-MCNC: 1.1 MG/DL (ref 0.5–1.4)
EST. GFR  (NO RACE VARIABLE): >60 ML/MIN/1.73 M^2
GLUCOSE SERPL-MCNC: 115 MG/DL (ref 70–110)
HDLC SERPL-MCNC: 18 MG/DL (ref 40–75)
HDLC SERPL: 13.4 % (ref 20–50)
LDLC SERPL CALC-MCNC: 57.6 MG/DL (ref 63–159)
NONHDLC SERPL-MCNC: 116 MG/DL
POTASSIUM SERPL-SCNC: 4.4 MMOL/L (ref 3.5–5.1)
PROT SERPL-MCNC: 7.3 G/DL (ref 6–8.4)
SODIUM SERPL-SCNC: 141 MMOL/L (ref 136–145)
TRIGL SERPL-MCNC: 292 MG/DL (ref 30–150)

## 2022-09-08 PROCEDURE — 36415 COLL VENOUS BLD VENIPUNCTURE: CPT | Mod: S$GLB,,, | Performed by: FAMILY MEDICINE

## 2022-09-08 PROCEDURE — 80061 LIPID PANEL: CPT | Performed by: FAMILY MEDICINE

## 2022-09-08 PROCEDURE — 80053 COMPREHEN METABOLIC PANEL: CPT | Performed by: FAMILY MEDICINE

## 2022-09-08 PROCEDURE — 36415 PR COLLECTION VENOUS BLOOD,VENIPUNCTURE: ICD-10-PCS | Mod: S$GLB,,, | Performed by: FAMILY MEDICINE

## 2022-12-15 ENCOUNTER — IMMUNIZATION (OUTPATIENT)
Dept: FAMILY MEDICINE | Facility: CLINIC | Age: 57
End: 2022-12-15
Payer: COMMERCIAL

## 2022-12-15 DIAGNOSIS — Z23 NEED FOR VACCINATION: Primary | ICD-10-CM

## 2022-12-15 PROCEDURE — 91312 COVID-19, MRNA, LNP-S, BIVALENT BOOSTER, PF, 30 MCG/0.3 ML DOSE: ICD-10-PCS | Mod: S$GLB,,, | Performed by: FAMILY MEDICINE

## 2022-12-15 PROCEDURE — 90471 IMMUNIZATION ADMIN: CPT | Mod: S$GLB,,, | Performed by: FAMILY MEDICINE

## 2022-12-15 PROCEDURE — 0124A COVID-19, MRNA, LNP-S, BIVALENT BOOSTER, PF, 30 MCG/0.3 ML DOSE: CPT | Mod: PBBFAC | Performed by: FAMILY MEDICINE

## 2022-12-15 PROCEDURE — 90471 FLU VACCINE (QUAD) GREATER THAN OR EQUAL TO 3YO PRESERVATIVE FREE IM: ICD-10-PCS | Mod: S$GLB,,, | Performed by: FAMILY MEDICINE

## 2022-12-15 PROCEDURE — 91312 COVID-19, MRNA, LNP-S, BIVALENT BOOSTER, PF, 30 MCG/0.3 ML DOSE: CPT | Mod: S$GLB,,, | Performed by: FAMILY MEDICINE

## 2022-12-15 PROCEDURE — 90686 FLU VACCINE (QUAD) GREATER THAN OR EQUAL TO 3YO PRESERVATIVE FREE IM: ICD-10-PCS | Mod: S$GLB,,, | Performed by: FAMILY MEDICINE

## 2022-12-15 PROCEDURE — 90686 IIV4 VACC NO PRSV 0.5 ML IM: CPT | Mod: S$GLB,,, | Performed by: FAMILY MEDICINE

## 2023-02-20 ENCOUNTER — OFFICE VISIT (OUTPATIENT)
Dept: FAMILY MEDICINE | Facility: CLINIC | Age: 58
End: 2023-02-20
Payer: COMMERCIAL

## 2023-02-20 VITALS
WEIGHT: 211.75 LBS | SYSTOLIC BLOOD PRESSURE: 138 MMHG | HEIGHT: 66 IN | HEART RATE: 84 BPM | DIASTOLIC BLOOD PRESSURE: 80 MMHG | BODY MASS INDEX: 34.03 KG/M2 | RESPIRATION RATE: 20 BRPM

## 2023-02-20 DIAGNOSIS — M50.00 CERVICAL DISC DISEASE WITH MYELOPATHY: ICD-10-CM

## 2023-02-20 DIAGNOSIS — G47.33 OSA (OBSTRUCTIVE SLEEP APNEA): Primary | ICD-10-CM

## 2023-02-20 DIAGNOSIS — Q28.3 CAVERNOUS MALFORMATION: ICD-10-CM

## 2023-02-20 DIAGNOSIS — I10 ESSENTIAL HYPERTENSION: ICD-10-CM

## 2023-02-20 DIAGNOSIS — E78.5 HYPERLIPIDEMIA, UNSPECIFIED HYPERLIPIDEMIA TYPE: ICD-10-CM

## 2023-02-20 PROCEDURE — 99999 PR PBB SHADOW E&M-EST. PATIENT-LVL III: ICD-10-PCS | Mod: PBBFAC,,, | Performed by: FAMILY MEDICINE

## 2023-02-20 PROCEDURE — 99214 PR OFFICE/OUTPT VISIT, EST, LEVL IV, 30-39 MIN: ICD-10-PCS | Mod: S$GLB,,, | Performed by: FAMILY MEDICINE

## 2023-02-20 PROCEDURE — 99999 PR PBB SHADOW E&M-EST. PATIENT-LVL III: CPT | Mod: PBBFAC,,, | Performed by: FAMILY MEDICINE

## 2023-02-20 PROCEDURE — 99214 OFFICE O/P EST MOD 30 MIN: CPT | Mod: S$GLB,,, | Performed by: FAMILY MEDICINE

## 2023-02-20 RX ORDER — ATORVASTATIN CALCIUM 20 MG/1
20 TABLET, FILM COATED ORAL DAILY
Qty: 30 TABLET | Refills: 5 | Status: SHIPPED | OUTPATIENT
Start: 2023-02-20 | End: 2023-08-30 | Stop reason: SDUPTHER

## 2023-02-20 RX ORDER — FENOFIBRIC ACID 135 MG/1
1 CAPSULE, DELAYED RELEASE ORAL DAILY
Qty: 30 CAPSULE | Refills: 5 | Status: SHIPPED | OUTPATIENT
Start: 2023-02-20 | End: 2023-08-30 | Stop reason: SDUPTHER

## 2023-02-20 RX ORDER — IRBESARTAN 300 MG/1
300 TABLET ORAL DAILY
Qty: 30 TABLET | Refills: 5 | Status: SHIPPED | OUTPATIENT
Start: 2023-02-20 | End: 2023-08-30 | Stop reason: SDUPTHER

## 2023-02-20 RX ORDER — PROPRANOLOL HYDROCHLORIDE 80 MG/1
80 CAPSULE, EXTENDED RELEASE ORAL DAILY
Qty: 30 CAPSULE | Refills: 5 | Status: SHIPPED | OUTPATIENT
Start: 2023-02-20 | End: 2023-08-30 | Stop reason: SDUPTHER

## 2023-02-20 NOTE — PROGRESS NOTES
Pt is a 58 y.o. male who presents for check up for   Encounter Diagnoses   Name Primary?    Hyperlipidemia     Hypertension    . Doing well on current meds. Denies any side effects. Prevention is up to date.  He refuses the flu shot    HPI: White male here for checkup today.   His blood pressure is running well. He denies side effects such as dry cough, lightheadedness, fatigue. His cholesterol medications were well tolerated. He does not have any side effects such as myalgias. He denies fever chills weight loss or weight gain. He denies chest pain.  Patient recently had blood work done through work.  Lab results were reviewed.  This will be scanned into the computer.  Neck pain under control.   History of cervical fusion.  Gets paresthesia in right hand. pain comes and goes.  Overall he is much better.  He was diagnosed with a cavernous hemangioma in the brain.  In 2017 he underwent brain surgery to remove it.  Has  Doing remarkably well.  His speech has improved nicely.  He is no longer having double vision.    He is having loud snoring and daytime sleepiness.  Has fitful sleep.  Now on CPAP at 19.      ROS:   Gen.: No fever, chills, weight loss, weight gain  HEENT: No headaches, sinus congestion, sore throat, change in vision  CV: No chest pain  RESP: No shortness of breath, wheezing, cough  GI: No change in bowel habits, no diarrhea, no abdominal pain, no hematochezia  : No dysuria, frequency  MSK: Occasional left hip pain, neck pain, right arm weakness  NEURO: No numbness, weakness  ENDO: No polyuria, polydipsia, heat or cold intolerance    PHYSICAL EXAM;   Vitals:    02/20/23 1340   BP: 138/80   Pulse: 84   Resp: 20     APPEARANCE: Well nourished, well developed, in no acute distress.   HEAD: Normocephalic, atraumatic.  EYES: PERRL. EOMI.   EARS: TM's intact. Light reflex normal. No retraction or perforation.  Very hard of hearing   NOSE: Mucosa pink. Airway clear.  MOUTH & THROAT: No tonsillar enlargement.  No pharyngeal erythema or exudate. No stridor.  NECK: Supple.  No carotid bruits.  No JVD   CHEST: Lungs clear to auscultation.  CARDIOVASCULAR: Normal S1, S2. No rubs, murmurs or gallops.  MUSCULOSKELETAL: No clubbing cyanosis or edema  SKIN:  1 skin tag, 2 moles consistent with seborrheic keratoses present.  These were frozen with cryo  :  High riding testicles.  Able to easily palpate the Vas Deferens  NEUROLOGIC:    Cranial Nerves: III-XII grossly intact. still suffers with double vision    Motor: No focal deficits   MENTAL STATUS: Normal orientation to person, place and time, normal affect, normal flow thought, normal memory.    Lab Results   Component Value Date    LDLCALC 57.6 (L) 09/08/2022     Lab Results   Component Value Date     09/08/2022    K 4.4 09/08/2022     09/08/2022    CO2 24 09/08/2022    BUN 21 (H) 09/08/2022    CREATININE 1.1 09/08/2022    CALCIUM 9.4 09/08/2022    ANIONGAP 13 09/08/2022    ESTGFRAFRICA >60 02/21/2022    EGFRNONAA >60 02/21/2022     ASSESSMENT/PLAN:    1. NIA (obstructive sleep apnea)  Overview:  Advised him to continue CPAP at 19 cm pressure      2. Hyperlipidemia, unspecified hyperlipidemia type  Overview:  Low fat diet. Avoid sweets. A 10 pound weight loss by the next visit as a  good goal. Increase consumption of fruits and vegetables, fish and chicken.  Continue Lipitor 20 mg daily, Trilipix 135 mg p.o. daily.    Orders:  -     atorvastatin (LIPITOR) 20 MG tablet; Take 1 tablet (20 mg total) by mouth once daily.  Dispense: 30 tablet; Refill: 5  -     fenofibric acid (FIBRICOR) 135 mg CpDR; Take 1 capsule (135 mg total) by mouth once daily.  Dispense: 30 capsule; Refill: 5  -     Lipid Panel; Future; Expected date: 05/20/2023    3. Essential hypertension  Overview:  Encouraged patient to avoid table salt and try to follow a 2 g sodium diet  Continue propranolol 80 mg daily  Continue Irbisartan to 300 mg daily  Walk 20 minutes per day.  Try to lose  weight.    Orders:  -     irbesartan (AVAPRO) 300 MG tablet; Take 1 tablet (300 mg total) by mouth once daily.  Dispense: 30 tablet; Refill: 5  -     propranoloL (INDERAL LA) 80 MG 24 hr capsule; Take 1 capsule (80 mg total) by mouth once daily.  Dispense: 30 capsule; Refill: 5  -     Comprehensive Metabolic Panel; Future; Expected date: 05/20/2023    4. Cavernous malformation  Overview:  Doing well.  No longer requires neurosurgical care.    Assessment & Plan:  Cerebral cavernous malformation  S/P resection with residual speech and motor issues      5. Cervical disc disease with myelopathy  Overview:  Status post C5-C6 ACDF.  Doing well          Next appointment will be in 6 months.

## 2023-08-14 ENCOUNTER — CLINICAL SUPPORT (OUTPATIENT)
Dept: FAMILY MEDICINE | Facility: CLINIC | Age: 58
End: 2023-08-14
Payer: COMMERCIAL

## 2023-08-14 DIAGNOSIS — E78.5 HYPERLIPIDEMIA, UNSPECIFIED HYPERLIPIDEMIA TYPE: ICD-10-CM

## 2023-08-14 DIAGNOSIS — I10 ESSENTIAL HYPERTENSION: ICD-10-CM

## 2023-08-14 LAB
ALBUMIN SERPL BCP-MCNC: 4.1 G/DL (ref 3.5–5.2)
ALP SERPL-CCNC: 48 U/L (ref 55–135)
ALT SERPL W/O P-5'-P-CCNC: 15 U/L (ref 10–44)
ANION GAP SERPL CALC-SCNC: 11 MMOL/L (ref 8–16)
AST SERPL-CCNC: 22 U/L (ref 10–40)
BILIRUB SERPL-MCNC: 0.7 MG/DL (ref 0.1–1)
BUN SERPL-MCNC: 18 MG/DL (ref 6–20)
CALCIUM SERPL-MCNC: 9.8 MG/DL (ref 8.7–10.5)
CHLORIDE SERPL-SCNC: 105 MMOL/L (ref 95–110)
CHOLEST SERPL-MCNC: 185 MG/DL (ref 120–199)
CHOLEST/HDLC SERPL: 6.2 {RATIO} (ref 2–5)
CO2 SERPL-SCNC: 25 MMOL/L (ref 23–29)
CREAT SERPL-MCNC: 1.2 MG/DL (ref 0.5–1.4)
EST. GFR  (NO RACE VARIABLE): >60 ML/MIN/1.73 M^2
GLUCOSE SERPL-MCNC: 101 MG/DL (ref 70–110)
HDLC SERPL-MCNC: 30 MG/DL (ref 40–75)
HDLC SERPL: 16.2 % (ref 20–50)
LDLC SERPL CALC-MCNC: 115.8 MG/DL (ref 63–159)
NONHDLC SERPL-MCNC: 155 MG/DL
POTASSIUM SERPL-SCNC: 4.3 MMOL/L (ref 3.5–5.1)
PROT SERPL-MCNC: 7.6 G/DL (ref 6–8.4)
SODIUM SERPL-SCNC: 141 MMOL/L (ref 136–145)
TRIGL SERPL-MCNC: 196 MG/DL (ref 30–150)

## 2023-08-14 PROCEDURE — 80061 LIPID PANEL: CPT | Performed by: FAMILY MEDICINE

## 2023-08-14 PROCEDURE — 80053 COMPREHEN METABOLIC PANEL: CPT | Performed by: FAMILY MEDICINE

## 2023-08-14 PROCEDURE — 36415 COLL VENOUS BLD VENIPUNCTURE: CPT | Mod: S$GLB,,, | Performed by: FAMILY MEDICINE

## 2023-08-14 PROCEDURE — 36415 PR COLLECTION VENOUS BLOOD,VENIPUNCTURE: ICD-10-PCS | Mod: S$GLB,,, | Performed by: FAMILY MEDICINE

## 2023-08-30 ENCOUNTER — OFFICE VISIT (OUTPATIENT)
Dept: INTERNAL MEDICINE | Facility: CLINIC | Age: 58
End: 2023-08-30
Payer: COMMERCIAL

## 2023-08-30 VITALS
OXYGEN SATURATION: 98 % | HEIGHT: 66 IN | BODY MASS INDEX: 32.27 KG/M2 | WEIGHT: 200.81 LBS | DIASTOLIC BLOOD PRESSURE: 86 MMHG | HEART RATE: 66 BPM | RESPIRATION RATE: 18 BRPM | SYSTOLIC BLOOD PRESSURE: 130 MMHG

## 2023-08-30 DIAGNOSIS — M50.00 CERVICAL DISC DISEASE WITH MYELOPATHY: ICD-10-CM

## 2023-08-30 DIAGNOSIS — E78.5 HYPERLIPIDEMIA, UNSPECIFIED HYPERLIPIDEMIA TYPE: ICD-10-CM

## 2023-08-30 DIAGNOSIS — G47.33 OSA (OBSTRUCTIVE SLEEP APNEA): ICD-10-CM

## 2023-08-30 DIAGNOSIS — E78.2 MIXED HYPERLIPIDEMIA: ICD-10-CM

## 2023-08-30 DIAGNOSIS — L23.7 POISON IVY: ICD-10-CM

## 2023-08-30 DIAGNOSIS — Z12.5 SCREENING FOR PROSTATE CANCER: ICD-10-CM

## 2023-08-30 DIAGNOSIS — I10 ESSENTIAL HYPERTENSION: Primary | ICD-10-CM

## 2023-08-30 PROCEDURE — 99214 OFFICE O/P EST MOD 30 MIN: CPT | Mod: S$GLB,,, | Performed by: FAMILY MEDICINE

## 2023-08-30 PROCEDURE — 99999 PR PBB SHADOW E&M-EST. PATIENT-LVL III: CPT | Mod: PBBFAC,,, | Performed by: FAMILY MEDICINE

## 2023-08-30 PROCEDURE — 99999 PR PBB SHADOW E&M-EST. PATIENT-LVL III: ICD-10-PCS | Mod: PBBFAC,,, | Performed by: FAMILY MEDICINE

## 2023-08-30 PROCEDURE — 99214 PR OFFICE/OUTPT VISIT, EST, LEVL IV, 30-39 MIN: ICD-10-PCS | Mod: S$GLB,,, | Performed by: FAMILY MEDICINE

## 2023-08-30 RX ORDER — ATORVASTATIN CALCIUM 20 MG/1
20 TABLET, FILM COATED ORAL DAILY
Qty: 30 TABLET | Refills: 5 | Status: SHIPPED | OUTPATIENT
Start: 2023-08-30 | End: 2024-02-28 | Stop reason: SDUPTHER

## 2023-08-30 RX ORDER — IRBESARTAN 300 MG/1
300 TABLET ORAL DAILY
Qty: 30 TABLET | Refills: 5 | Status: SHIPPED | OUTPATIENT
Start: 2023-08-30 | End: 2024-02-28 | Stop reason: SDUPTHER

## 2023-08-30 RX ORDER — TRIAMCINOLONE ACETONIDE 1 MG/G
CREAM TOPICAL 2 TIMES DAILY PRN
Qty: 45 G | Refills: 5 | Status: SHIPPED | OUTPATIENT
Start: 2023-08-30 | End: 2024-02-28

## 2023-08-30 RX ORDER — FENOFIBRIC ACID 135 MG/1
1 CAPSULE, DELAYED RELEASE ORAL DAILY
Qty: 30 CAPSULE | Refills: 5 | Status: SHIPPED | OUTPATIENT
Start: 2023-08-30 | End: 2024-02-28 | Stop reason: SDUPTHER

## 2023-08-30 RX ORDER — PROPRANOLOL HYDROCHLORIDE 80 MG/1
80 CAPSULE, EXTENDED RELEASE ORAL DAILY
Qty: 30 CAPSULE | Refills: 5 | Status: SHIPPED | OUTPATIENT
Start: 2023-08-30 | End: 2024-02-28 | Stop reason: SDUPTHER

## 2023-08-30 NOTE — PROGRESS NOTES
HPI: Joel Deluca is a 58 y.o. male here for checkup today.   His blood pressure is running well. He denies side effects such as dry cough, lightheadedness, fatigue. His cholesterol medications were well tolerated. He does not have any side effects such as myalgias. He denies fever chills weight loss or weight gain. He denies chest pain.  Patient recently had blood work done through work.  Lab results were reviewed.  This will be scanned into the computer.  Neck pain under control.   History of cervical fusion.  Gets paresthesia in right hand. pain comes and goes.  Overall he is much better.  He was diagnosed with a cavernous hemangioma in the brain.  In 2017 he underwent brain surgery to remove it.  Has  Doing remarkably well.  His speech has improved nicely.  He is no longer having double vision.    He is having loud snoring and daytime sleepiness.  Has fitful sleep.  Now on CPAP at 19.  Not well tolerated.    ROS:   Gen.: No fever, chills, weight loss, weight gain  HEENT: No headaches, sinus congestion, sore throat, change in vision  CV: No chest pain  RESP: No shortness of breath, wheezing, cough  GI: No change in bowel habits, no diarrhea, no abdominal pain, no hematochezia  : No dysuria, frequency  MSK: Occasional left hip pain, neck pain, right arm weakness  NEURO: No numbness, weakness  ENDO: No polyuria, polydipsia, heat or cold intolerance    PHYSICAL EXAM;   Vitals:    08/30/23 1006   BP: 130/86   Pulse: 66   Resp: 18     APPEARANCE: Well nourished, well developed, in no acute distress.   HEAD: Normocephalic, atraumatic.  EYES: PERRL. EOMI.   EARS: TM's intact. Light reflex normal. No retraction or perforation.  Very hard of hearing   NOSE: Mucosa pink. Airway clear.  MOUTH & THROAT: No tonsillar enlargement. No pharyngeal erythema or exudate. No stridor.  NECK: Supple.  No carotid bruits.  No JVD   CHEST: Lungs clear to auscultation.  CARDIOVASCULAR: Normal S1, S2. No rubs, murmurs or  gallops.  MUSCULOSKELETAL: No clubbing cyanosis or edema  SKIN:  1 skin tag, 2 moles consistent with seborrheic keratoses present.  These were frozen with cryo  :  High riding testicles.  Able to easily palpate the Vas Deferens  NEUROLOGIC:    Cranial Nerves: III-XII grossly intact. still suffers with double vision    Motor: No focal deficits   MENTAL STATUS: Normal orientation to person, place and time, normal affect, normal flow thought, normal memory.    Lab Results   Component Value Date    LDLCALC 115.8 08/14/2023     Lab Results   Component Value Date     08/14/2023    K 4.3 08/14/2023     08/14/2023    CO2 25 08/14/2023    BUN 18 08/14/2023    CREATININE 1.2 08/14/2023    CALCIUM 9.8 08/14/2023    ANIONGAP 11 08/14/2023    ESTGFRAFRICA >60 02/21/2022    EGFRNONAA >60 02/21/2022     ASSESSMENT/PLAN:    1. Essential hypertension  Overview:  Encouraged patient to avoid table salt and try to follow a 2 g sodium diet  Continue propranolol 80 mg daily  Continue Irbisartan to 300 mg daily  Walk 20 minutes per day.  Try to lose weight.    Orders:  -     propranoloL (INDERAL LA) 80 MG 24 hr capsule; Take 1 capsule (80 mg total) by mouth once daily.  Dispense: 30 capsule; Refill: 5  -     irbesartan (AVAPRO) 300 MG tablet; Take 1 tablet (300 mg total) by mouth once daily.  Dispense: 30 tablet; Refill: 5  -     Comprehensive Metabolic Panel; Future; Expected date: 02/01/2024    2. Cervical disc disease with myelopathy  Overview:  Status post C5-C6 ACDF.  Still has mild radiculopathy.        3. Mixed hyperlipidemia  Overview:  Low fat diet. Avoid sweets. A 10 pound weight loss by the next visit as a  good goal. Increase consumption of fruits and vegetables, fish and chicken.  Continue Lipitor 20 mg daily, Trilipix 135 mg p.o. daily.    Orders:  -     Lipid Panel; Future; Expected date: 02/01/2024    4. NIA (obstructive sleep apnea)  Overview:  Advised him to continue CPAP at 19 cm pressure      5.  Hyperlipidemia, unspecified hyperlipidemia type  Overview:  Low fat diet. Avoid sweets. A 10 pound weight loss by the next visit as a  good goal. Increase consumption of fruits and vegetables, fish and chicken.  Continue Lipitor 20 mg daily, Trilipix 135 mg p.o. daily.    Orders:  -     atorvastatin (LIPITOR) 20 MG tablet; Take 1 tablet (20 mg total) by mouth once daily.  Dispense: 30 tablet; Refill: 5  -     fenofibric acid (FIBRICOR) 135 mg CpDR; Take 1 capsule (135 mg total) by mouth once daily.  Dispense: 30 capsule; Refill: 5    6. Poison ivy  -     triamcinolone acetonide 0.1% (KENALOG) 0.1 % cream; Apply topically 2 (two) times daily as needed (poisin ivy).  Dispense: 45 g; Refill: 5    7. Screening for prostate cancer  -     PSA, Screening; Future; Expected date: 02/01/2024          Next appointment will be in 6 months.

## 2024-02-28 ENCOUNTER — OFFICE VISIT (OUTPATIENT)
Dept: INTERNAL MEDICINE | Facility: CLINIC | Age: 59
End: 2024-02-28
Payer: COMMERCIAL

## 2024-02-28 VITALS — HEART RATE: 80 BPM | BODY MASS INDEX: 33.52 KG/M2 | WEIGHT: 208.56 LBS | HEIGHT: 66 IN | RESPIRATION RATE: 18 BRPM

## 2024-02-28 DIAGNOSIS — Z12.5 SCREENING FOR PROSTATE CANCER: ICD-10-CM

## 2024-02-28 DIAGNOSIS — M50.00 CERVICAL DISC DISEASE WITH MYELOPATHY: ICD-10-CM

## 2024-02-28 DIAGNOSIS — Q28.3 CAVERNOUS MALFORMATION: ICD-10-CM

## 2024-02-28 DIAGNOSIS — I10 ESSENTIAL HYPERTENSION: Primary | ICD-10-CM

## 2024-02-28 DIAGNOSIS — G47.33 OSA (OBSTRUCTIVE SLEEP APNEA): ICD-10-CM

## 2024-02-28 DIAGNOSIS — E78.5 HYPERLIPIDEMIA, UNSPECIFIED HYPERLIPIDEMIA TYPE: ICD-10-CM

## 2024-02-28 LAB
ALBUMIN SERPL BCP-MCNC: 4 G/DL (ref 3.5–5.2)
ALP SERPL-CCNC: 62 U/L (ref 55–135)
ALT SERPL W/O P-5'-P-CCNC: 21 U/L (ref 10–44)
ANION GAP SERPL CALC-SCNC: 11 MMOL/L (ref 8–16)
AST SERPL-CCNC: 23 U/L (ref 10–40)
BILIRUB SERPL-MCNC: 0.4 MG/DL (ref 0.1–1)
BUN SERPL-MCNC: 20 MG/DL (ref 6–20)
CALCIUM SERPL-MCNC: 10.1 MG/DL (ref 8.7–10.5)
CHLORIDE SERPL-SCNC: 105 MMOL/L (ref 95–110)
CHOLEST SERPL-MCNC: 185 MG/DL (ref 120–199)
CHOLEST/HDLC SERPL: 4.7 {RATIO} (ref 2–5)
CO2 SERPL-SCNC: 24 MMOL/L (ref 23–29)
COMPLEXED PSA SERPL-MCNC: 1.8 NG/ML (ref 0–4)
CREAT SERPL-MCNC: 1.1 MG/DL (ref 0.5–1.4)
EST. GFR  (NO RACE VARIABLE): >60 ML/MIN/1.73 M^2
GLUCOSE SERPL-MCNC: 94 MG/DL (ref 70–110)
HDLC SERPL-MCNC: 39 MG/DL (ref 40–75)
HDLC SERPL: 21.1 % (ref 20–50)
LDLC SERPL CALC-MCNC: 109.4 MG/DL (ref 63–159)
NONHDLC SERPL-MCNC: 146 MG/DL
POTASSIUM SERPL-SCNC: 4 MMOL/L (ref 3.5–5.1)
PROT SERPL-MCNC: 7.3 G/DL (ref 6–8.4)
SODIUM SERPL-SCNC: 140 MMOL/L (ref 136–145)
TRIGL SERPL-MCNC: 183 MG/DL (ref 30–150)

## 2024-02-28 PROCEDURE — 84153 ASSAY OF PSA TOTAL: CPT | Performed by: FAMILY MEDICINE

## 2024-02-28 PROCEDURE — 99999 PR PBB SHADOW E&M-EST. PATIENT-LVL III: CPT | Mod: PBBFAC,,, | Performed by: FAMILY MEDICINE

## 2024-02-28 PROCEDURE — 80061 LIPID PANEL: CPT | Performed by: FAMILY MEDICINE

## 2024-02-28 PROCEDURE — 80053 COMPREHEN METABOLIC PANEL: CPT | Performed by: FAMILY MEDICINE

## 2024-02-28 PROCEDURE — 99214 OFFICE O/P EST MOD 30 MIN: CPT | Mod: S$GLB,,, | Performed by: FAMILY MEDICINE

## 2024-02-28 RX ORDER — ATORVASTATIN CALCIUM 20 MG/1
20 TABLET, FILM COATED ORAL DAILY
Qty: 30 TABLET | Refills: 5 | Status: SHIPPED | OUTPATIENT
Start: 2024-02-28

## 2024-02-28 RX ORDER — PROPRANOLOL HYDROCHLORIDE 80 MG/1
80 CAPSULE, EXTENDED RELEASE ORAL DAILY
Qty: 30 CAPSULE | Refills: 5 | Status: SHIPPED | OUTPATIENT
Start: 2024-02-28

## 2024-02-28 RX ORDER — FENOFIBRIC ACID 135 MG/1
1 CAPSULE, DELAYED RELEASE ORAL DAILY
Qty: 30 CAPSULE | Refills: 5 | Status: SHIPPED | OUTPATIENT
Start: 2024-02-28

## 2024-02-28 RX ORDER — IRBESARTAN 300 MG/1
300 TABLET ORAL DAILY
Qty: 30 TABLET | Refills: 5 | Status: SHIPPED | OUTPATIENT
Start: 2024-02-28 | End: 2025-02-27

## 2024-02-28 NOTE — PROGRESS NOTES
HPI: Joel Deluca is a 59 y.o. male here for checkup today.   His blood pressure is running well. He denies side effects such as dry cough, lightheadedness, fatigue. His cholesterol medications were well tolerated. He does not have any side effects such as myalgias. He denies fever chills weight loss or weight gain. He denies chest pain.  Patient recently had blood work done through work.  Lab results were reviewed.    Neck pain under control.   History of cervical fusion.  Gets paresthesia in right hand. pain comes and goes.  Overall he is much better.  He was diagnosed with a cavernous hemangioma in the brain.  In 2017 he underwent brain surgery to remove it.  Has  Doing remarkably well.  His speech has improved nicely.  He is no longer having double vision.  He was very hard of hearing.  He is having loud snoring and daytime sleepiness.  Has fitful sleep.  Now on CPAP at 19.  Not well tolerated.  He tries to use it.    ROS:   Gen.: No fever, chills, weight loss, weight gain  HEENT: No headaches, sinus congestion, sore throat, change in vision  CV: No chest pain  RESP: No shortness of breath, wheezing, cough  GI: No change in bowel habits, no diarrhea, no abdominal pain, no hematochezia  : No dysuria, frequency  MSK: Occasional left hip pain, neck pain, right arm weakness  NEURO: No numbness, weakness  ENDO: No polyuria, polydipsia, heat or cold intolerance    PHYSICAL EXAM;   Vitals:    02/28/24 0824   BP: (P) 132/84   Pulse: 80   Resp: 18     APPEARANCE: Well nourished, well developed, in no acute distress.   HEAD: Normocephalic, atraumatic.  EYES: PERRL. EOMI.   EARS: TM's intact. Light reflex normal. No retraction or perforation.  Very hard of hearing   NOSE: Mucosa pink. Airway clear.  MOUTH & THROAT: No tonsillar enlargement. No pharyngeal erythema or exudate. No stridor.  NECK: Supple.  No carotid bruits.  No JVD   CHEST: Lungs clear to auscultation.  CARDIOVASCULAR: Normal S1, S2. No rubs, murmurs  or gallops.  MUSCULOSKELETAL: No clubbing cyanosis or edema  SKIN:  1 skin tag, 2 moles consistent with seborrheic keratoses present.  These were frozen with cryo  :  High riding testicles.  Able to easily palpate the Vas Deferens  NEUROLOGIC:    Cranial Nerves: III-XII grossly intact. still suffers with double vision    Motor: No focal deficits   MENTAL STATUS: Normal orientation to person, place and time, normal affect, normal flow thought, normal memory.    Lab Results   Component Value Date    LDLCALC 115.8 08/14/2023     Lab Results   Component Value Date     08/14/2023    K 4.3 08/14/2023     08/14/2023    CO2 25 08/14/2023    BUN 18 08/14/2023    CREATININE 1.2 08/14/2023    CALCIUM 9.8 08/14/2023    ANIONGAP 11 08/14/2023    ESTGFRAFRICA >60 02/21/2022    EGFRNONAA >60 02/21/2022     ASSESSMENT/PLAN:    1. Essential hypertension  Overview:  Encouraged patient to avoid table salt and try to follow a 2 g sodium diet  Continue propranolol 80 mg daily  Continue Irbisartan to 300 mg daily  Walk 20 minutes per day.  Try to lose weight.    Orders:  -     irbesartan (AVAPRO) 300 MG tablet; Take 1 tablet (300 mg total) by mouth once daily.  Dispense: 30 tablet; Refill: 5  -     propranoloL (INDERAL LA) 80 MG 24 hr capsule; Take 1 capsule (80 mg total) by mouth once daily.  Dispense: 30 capsule; Refill: 5  -     Comprehensive Metabolic Panel; Future; Expected date: 02/28/2024    2. Hyperlipidemia, unspecified hyperlipidemia type  Overview:  Low fat diet. Avoid sweets. A 10 pound weight loss by the next visit as a  good goal. Increase consumption of fruits and vegetables, fish and chicken.  Continue Lipitor 20 mg daily, Trilipix 135 mg p.o. daily.    Orders:  -     atorvastatin (LIPITOR) 20 MG tablet; Take 1 tablet (20 mg total) by mouth once daily.  Dispense: 30 tablet; Refill: 5  -     fenofibric acid (FIBRICOR) 135 mg CpDR; Take 1 capsule (135 mg total) by mouth once daily.  Dispense: 30 capsule;  Refill: 5  -     Lipid Panel; Future; Expected date: 02/28/2024    3. NIA (obstructive sleep apnea)  Overview:  Advised him to continue CPAP at 19 cm pressure      4. Cervical disc disease with myelopathy  Overview:  Status post C5-C6 ACDF 2004  Still has mild radiculopathy.        5. Cavernous malformation  Overview:  Doing well.  No longer requires neurosurgical care.      6. Screening for prostate cancer  -     PSA, Screening; Future; Expected date: 02/28/2024    Next appointment will be in 6 months.

## 2024-08-19 ENCOUNTER — LAB VISIT (OUTPATIENT)
Dept: FAMILY MEDICINE | Facility: CLINIC | Age: 59
End: 2024-08-19
Payer: COMMERCIAL

## 2024-08-19 ENCOUNTER — OFFICE VISIT (OUTPATIENT)
Dept: FAMILY MEDICINE | Facility: CLINIC | Age: 59
End: 2024-08-19
Payer: COMMERCIAL

## 2024-08-19 VITALS
BODY MASS INDEX: 31.43 KG/M2 | SYSTOLIC BLOOD PRESSURE: 135 MMHG | OXYGEN SATURATION: 96 % | HEIGHT: 66 IN | DIASTOLIC BLOOD PRESSURE: 70 MMHG | RESPIRATION RATE: 14 BRPM | HEART RATE: 50 BPM | WEIGHT: 195.56 LBS

## 2024-08-19 DIAGNOSIS — I10 ESSENTIAL HYPERTENSION: ICD-10-CM

## 2024-08-19 DIAGNOSIS — Q28.3 CAVERNOUS MALFORMATION: ICD-10-CM

## 2024-08-19 DIAGNOSIS — G47.33 OSA (OBSTRUCTIVE SLEEP APNEA): ICD-10-CM

## 2024-08-19 DIAGNOSIS — E78.5 HYPERLIPIDEMIA, UNSPECIFIED HYPERLIPIDEMIA TYPE: ICD-10-CM

## 2024-08-19 DIAGNOSIS — I10 ESSENTIAL HYPERTENSION: Primary | ICD-10-CM

## 2024-08-19 LAB
ALBUMIN SERPL BCP-MCNC: 4.1 G/DL (ref 3.5–5.2)
ALP SERPL-CCNC: 48 U/L (ref 55–135)
ALT SERPL W/O P-5'-P-CCNC: 13 U/L (ref 10–44)
ANION GAP SERPL CALC-SCNC: 12 MMOL/L (ref 8–16)
AST SERPL-CCNC: 17 U/L (ref 10–40)
BILIRUB SERPL-MCNC: 0.5 MG/DL (ref 0.1–1)
BUN SERPL-MCNC: 26 MG/DL (ref 6–20)
CALCIUM SERPL-MCNC: 9.6 MG/DL (ref 8.7–10.5)
CHLORIDE SERPL-SCNC: 107 MMOL/L (ref 95–110)
CHOLEST SERPL-MCNC: 171 MG/DL (ref 120–199)
CHOLEST/HDLC SERPL: 4.6 {RATIO} (ref 2–5)
CO2 SERPL-SCNC: 22 MMOL/L (ref 23–29)
CREAT SERPL-MCNC: 1.1 MG/DL (ref 0.5–1.4)
EST. GFR  (NO RACE VARIABLE): >60 ML/MIN/1.73 M^2
GLUCOSE SERPL-MCNC: 99 MG/DL (ref 70–110)
HDLC SERPL-MCNC: 37 MG/DL (ref 40–75)
HDLC SERPL: 21.6 % (ref 20–50)
LDLC SERPL CALC-MCNC: 104 MG/DL (ref 63–159)
NONHDLC SERPL-MCNC: 134 MG/DL
POTASSIUM SERPL-SCNC: 4.2 MMOL/L (ref 3.5–5.1)
PROT SERPL-MCNC: 7.4 G/DL (ref 6–8.4)
SODIUM SERPL-SCNC: 141 MMOL/L (ref 136–145)
TRIGL SERPL-MCNC: 150 MG/DL (ref 30–150)

## 2024-08-19 PROCEDURE — 80053 COMPREHEN METABOLIC PANEL: CPT | Performed by: FAMILY MEDICINE

## 2024-08-19 PROCEDURE — 36415 COLL VENOUS BLD VENIPUNCTURE: CPT | Performed by: FAMILY MEDICINE

## 2024-08-19 PROCEDURE — 99214 OFFICE O/P EST MOD 30 MIN: CPT | Mod: S$GLB,,, | Performed by: FAMILY MEDICINE

## 2024-08-19 PROCEDURE — 99999 PR PBB SHADOW E&M-EST. PATIENT-LVL IV: CPT | Mod: PBBFAC,,, | Performed by: FAMILY MEDICINE

## 2024-08-19 PROCEDURE — 80061 LIPID PANEL: CPT | Performed by: FAMILY MEDICINE

## 2024-08-19 PROCEDURE — 36415 COLL VENOUS BLD VENIPUNCTURE: CPT | Mod: S$GLB,,, | Performed by: FAMILY MEDICINE

## 2024-08-19 RX ORDER — ATORVASTATIN CALCIUM 20 MG/1
20 TABLET, FILM COATED ORAL DAILY
Qty: 30 TABLET | Refills: 5 | Status: SHIPPED | OUTPATIENT
Start: 2024-08-19

## 2024-08-19 RX ORDER — PROPRANOLOL HYDROCHLORIDE 80 MG/1
80 CAPSULE, EXTENDED RELEASE ORAL DAILY
Qty: 30 CAPSULE | Refills: 5 | Status: SHIPPED | OUTPATIENT
Start: 2024-08-19

## 2024-08-19 RX ORDER — IRBESARTAN 300 MG/1
300 TABLET ORAL DAILY
Qty: 30 TABLET | Refills: 5 | Status: SHIPPED | OUTPATIENT
Start: 2024-08-19 | End: 2025-08-19

## 2024-08-19 RX ORDER — FENOFIBRIC ACID 135 MG/1
1 CAPSULE, DELAYED RELEASE ORAL DAILY
Qty: 30 CAPSULE | Refills: 5 | Status: SHIPPED | OUTPATIENT
Start: 2024-08-19

## 2024-08-19 NOTE — PROGRESS NOTES
HPI: Joel Deluca is a 59 y.o. male here for checkup today.   His blood pressure is running well. He denies side effects such as dry cough, lightheadedness, fatigue. His cholesterol medications were well tolerated. He does not have any side effects such as myalgias. He denies fever chills weight loss or weight gain. He denies chest pain.  Patient recently had blood work done through work.  Lab results were reviewed.    Neck pain under control.   History of cervical fusion.  Gets paresthesia in right hand. pain comes and goes.  Overall he is much better.  He was diagnosed with a cavernous hemangioma in the brain.  In 2017 he underwent brain surgery to remove it.  He is doing remarkably well.  His speech has improved nicely.  He is no longer having double vision.  He was very hard of hearing.  He is having loud snoring and daytime sleepiness.  Has fitful sleep.  Now on CPAP at 19.  Not well tolerated.  He tries to use it.    ROS:   Gen.: No fever, chills, weight loss, weight gain  HEENT: No headaches, sinus congestion, sore throat, change in vision  CV: No chest pain  RESP: No shortness of breath, wheezing, cough  GI: No change in bowel habits, no diarrhea, no abdominal pain, no hematochezia  : No dysuria, frequency  MSK: Occasional left hip pain, neck pain, right arm weakness  NEURO: No numbness, weakness  ENDO: No polyuria, polydipsia, heat or cold intolerance    PHYSICAL EXAM;   Vitals:    08/19/24 0818   BP: 135/70   Pulse: (!) 50   Resp:      APPEARANCE: Well nourished, well developed, in no acute distress.   HEAD: Normocephalic, atraumatic.  EYES: PERRL. EOMI.   EARS: TM's intact. Light reflex normal. No retraction or perforation.  Very hard of hearing   NOSE: Mucosa pink. Airway clear.  MOUTH & THROAT: No tonsillar enlargement. No pharyngeal erythema or exudate. No stridor.  NECK: Supple.  No carotid bruits.  No JVD   CHEST: Lungs clear to auscultation.  CARDIOVASCULAR: Normal S1, S2. No rubs, murmurs  or gallops.  MUSCULOSKELETAL: No clubbing cyanosis or edema  SKIN:  1 skin tag, 2 moles consistent with seborrheic keratoses present.  These were frozen with cryo  :  High riding testicles.  Able to easily palpate the Vas Deferens  NEUROLOGIC:    Cranial Nerves: III-XII grossly intact. still suffers with double vision    Motor: No focal deficits   MENTAL STATUS: Normal orientation to person, place and time, normal affect, normal flow thought, normal memory.    Lab Results   Component Value Date    LDLCALC 109.4 02/28/2024     Lab Results   Component Value Date     02/28/2024    K 4.0 02/28/2024     02/28/2024    CO2 24 02/28/2024    BUN 20 02/28/2024    CREATININE 1.1 02/28/2024    CALCIUM 10.1 02/28/2024    ANIONGAP 11 02/28/2024    ESTGFRAFRICA >60 02/21/2022    EGFRNONAA >60 02/21/2022     PSA, Screen (ng/mL)   Date Value   02/28/2024 1.8       ASSESSMENT/PLAN:    1. Essential hypertension  Overview:  Encouraged patient to avoid table salt and try to follow a 2 g sodium diet  Continue propranolol 80 mg daily  Continue Irbisartan to 300 mg daily  Walk 20 minutes per day.  Try to lose weight.    Orders:  -     irbesartan (AVAPRO) 300 MG tablet; Take 1 tablet (300 mg total) by mouth once daily.  Dispense: 30 tablet; Refill: 5  -     propranoloL (INDERAL LA) 80 MG 24 hr capsule; Take 1 capsule (80 mg total) by mouth once daily.  Dispense: 30 capsule; Refill: 5  -     Comprehensive Metabolic Panel; Future; Expected date: 08/19/2024    2. Hyperlipidemia, unspecified hyperlipidemia type  Overview:  Low fat diet. Avoid sweets. A 10 pound weight loss by the next visit as a  good goal. Increase consumption of fruits and vegetables, fish and chicken.  Continue Lipitor 20 mg daily, Trilipix 135 mg p.o. daily.    Orders:  -     atorvastatin (LIPITOR) 20 MG tablet; Take 1 tablet (20 mg total) by mouth once daily.  Dispense: 30 tablet; Refill: 5  -     fenofibric acid (FIBRICOR) 135 mg CpDR; Take 1 capsule (135 mg  total) by mouth once daily.  Dispense: 30 capsule; Refill: 5  -     Lipid Panel; Future; Expected date: 08/19/2024    3. Cavernous malformation  Overview:  Doing well.  No longer requires neurosurgical care.      4. NIA (obstructive sleep apnea)  Overview:  Advised him to continue CPAP at 19 cm pressure      Next appointment will be in 6 months.

## 2024-08-20 ENCOUNTER — TELEPHONE (OUTPATIENT)
Dept: FAMILY MEDICINE | Facility: CLINIC | Age: 59
End: 2024-08-20
Payer: COMMERCIAL

## 2024-08-20 NOTE — TELEPHONE ENCOUNTER
----- Message from Otis Martinez MD sent at 8/19/2024  5:37 PM CDT -----  Tell patient that labs look good.

## 2024-12-30 ENCOUNTER — TELEPHONE (OUTPATIENT)
Dept: INTERNAL MEDICINE | Facility: CLINIC | Age: 59
End: 2024-12-30
Payer: COMMERCIAL

## 2024-12-30 ENCOUNTER — HOSPITAL ENCOUNTER (EMERGENCY)
Facility: HOSPITAL | Age: 59
Discharge: HOME | End: 2024-12-30
Attending: EMERGENCY MEDICINE | Admitting: NEUROLOGICAL SURGERY
Payer: COMMERCIAL

## 2024-12-30 VITALS
TEMPERATURE: 98 F | SYSTOLIC BLOOD PRESSURE: 135 MMHG | HEART RATE: 59 BPM | DIASTOLIC BLOOD PRESSURE: 71 MMHG | RESPIRATION RATE: 14 BRPM | BODY MASS INDEX: 32.95 KG/M2 | HEIGHT: 66 IN | WEIGHT: 205 LBS | OXYGEN SATURATION: 95 %

## 2024-12-30 DIAGNOSIS — Z98.890 STATUS POST CRANIOTOMY: ICD-10-CM

## 2024-12-30 DIAGNOSIS — R47.81 SLURRED SPEECH: Primary | ICD-10-CM

## 2024-12-30 DIAGNOSIS — R29.818 ACUTE FOCAL NEUROLOGICAL DEFICIT: ICD-10-CM

## 2024-12-30 DIAGNOSIS — R26.9 GAIT DISTURBANCE: ICD-10-CM

## 2024-12-30 DIAGNOSIS — R90.89 ABNORMAL BRAIN MRI: ICD-10-CM

## 2024-12-30 DIAGNOSIS — H53.2 DIPLOPIA: ICD-10-CM

## 2024-12-30 DIAGNOSIS — Q28.3 CAVERNOUS MALFORMATION: ICD-10-CM

## 2024-12-30 LAB
ALBUMIN SERPL BCP-MCNC: 4.3 G/DL (ref 3.5–5.2)
ALP SERPL-CCNC: 47 U/L (ref 40–150)
ALT SERPL W/O P-5'-P-CCNC: 18 U/L (ref 10–44)
ANION GAP SERPL CALC-SCNC: 10 MMOL/L (ref 8–16)
APTT PPP: 27.8 SEC (ref 21–32)
AST SERPL-CCNC: 18 U/L (ref 10–40)
BASOPHILS # BLD AUTO: 0.04 K/UL (ref 0–0.2)
BASOPHILS NFR BLD: 0.5 % (ref 0–1.9)
BILIRUB SERPL-MCNC: 0.6 MG/DL (ref 0.1–1)
BUN SERPL-MCNC: 15 MG/DL (ref 6–20)
CALCIUM SERPL-MCNC: 10 MG/DL (ref 8.7–10.5)
CHLORIDE SERPL-SCNC: 107 MMOL/L (ref 95–110)
CO2 SERPL-SCNC: 24 MMOL/L (ref 23–29)
CREAT SERPL-MCNC: 1 MG/DL (ref 0.5–1.4)
CREAT SERPL-MCNC: 1 MG/DL (ref 0.5–1.4)
DIFFERENTIAL METHOD BLD: ABNORMAL
EOSINOPHIL # BLD AUTO: 0.2 K/UL (ref 0–0.5)
EOSINOPHIL NFR BLD: 2.3 % (ref 0–8)
ERYTHROCYTE [DISTWIDTH] IN BLOOD BY AUTOMATED COUNT: 13 % (ref 11.5–14.5)
EST. GFR  (NO RACE VARIABLE): >60 ML/MIN/1.73 M^2
GLUCOSE SERPL-MCNC: 96 MG/DL (ref 70–110)
HCT VFR BLD AUTO: 40 % (ref 40–54)
HGB BLD-MCNC: 14.1 G/DL (ref 14–18)
IMM GRANULOCYTES # BLD AUTO: 0.03 K/UL (ref 0–0.04)
IMM GRANULOCYTES NFR BLD AUTO: 0.4 % (ref 0–0.5)
INR PPP: 1 (ref 0.8–1.2)
LYMPHOCYTES # BLD AUTO: 1.8 K/UL (ref 1–4.8)
LYMPHOCYTES NFR BLD: 22.6 % (ref 18–48)
MCH RBC QN AUTO: 31.4 PG (ref 27–31)
MCHC RBC AUTO-ENTMCNC: 35.3 G/DL (ref 32–36)
MCV RBC AUTO: 89 FL (ref 82–98)
MONOCYTES # BLD AUTO: 0.8 K/UL (ref 0.3–1)
MONOCYTES NFR BLD: 9.2 % (ref 4–15)
NEUTROPHILS # BLD AUTO: 5.3 K/UL (ref 1.8–7.7)
NEUTROPHILS NFR BLD: 65 % (ref 38–73)
NRBC BLD-RTO: 0 /100 WBC
OHS QRS DURATION: 100 MS
OHS QTC CALCULATION: 412 MS
PLATELET # BLD AUTO: 259 K/UL (ref 150–450)
PMV BLD AUTO: 8.6 FL (ref 9.2–12.9)
POCT GLUCOSE: 98 MG/DL (ref 70–110)
POTASSIUM SERPL-SCNC: 3.9 MMOL/L (ref 3.5–5.1)
PROT SERPL-MCNC: 7.8 G/DL (ref 6–8.4)
PROTHROMBIN TIME: 11.4 SEC (ref 9–12.5)
RBC # BLD AUTO: 4.49 M/UL (ref 4.6–6.2)
SAMPLE: NORMAL
SODIUM SERPL-SCNC: 141 MMOL/L (ref 136–145)
TSH SERPL DL<=0.005 MIU/L-ACNC: 3.83 UIU/ML (ref 0.4–4)
WBC # BLD AUTO: 8.11 K/UL (ref 3.9–12.7)

## 2024-12-30 PROCEDURE — 85610 PROTHROMBIN TIME: CPT | Performed by: NURSE PRACTITIONER

## 2024-12-30 PROCEDURE — 82962 GLUCOSE BLOOD TEST: CPT

## 2024-12-30 PROCEDURE — 85730 THROMBOPLASTIN TIME PARTIAL: CPT | Performed by: NURSE PRACTITIONER

## 2024-12-30 PROCEDURE — 25500020 PHARM REV CODE 255: Performed by: NURSE PRACTITIONER

## 2024-12-30 PROCEDURE — 93005 ELECTROCARDIOGRAM TRACING: CPT

## 2024-12-30 PROCEDURE — 93010 ELECTROCARDIOGRAM REPORT: CPT | Mod: ,,, | Performed by: INTERNAL MEDICINE

## 2024-12-30 PROCEDURE — 99285 EMERGENCY DEPT VISIT HI MDM: CPT | Mod: 25

## 2024-12-30 PROCEDURE — 85025 COMPLETE CBC W/AUTO DIFF WBC: CPT | Performed by: NURSE PRACTITIONER

## 2024-12-30 PROCEDURE — 80053 COMPREHEN METABOLIC PANEL: CPT | Performed by: NURSE PRACTITIONER

## 2024-12-30 PROCEDURE — 84443 ASSAY THYROID STIM HORMONE: CPT | Performed by: NURSE PRACTITIONER

## 2024-12-30 RX ADMIN — IOHEXOL 100 ML: 350 INJECTION, SOLUTION INTRAVENOUS at 11:12

## 2024-12-30 NOTE — ED TRIAGE NOTES
Pt arrives to the ED today w/ complaint of slurred speech (more than baseline), unsteady gait, and double vision starting two weeks ago. Pt endorses a fall yesterday, no LOC. Pt endorses brain surgery 2 years ago and was left with slight slurred speech post op. Daughter states there was a death in the family at the beginning of December and believes that may the source of the exacerbation of symptoms. Pt is AAOx4, speaking in full and complete sentences, answering questions appropriately.

## 2024-12-30 NOTE — TELEPHONE ENCOUNTER
----- Message from Med Assistant Genie sent at 2024  8:28 AM CST -----  Joel Deluca  MRN: [id number]  : 1965  PCP: Otis Martinez  Home Phone      [phone]  Work Phone      Not on file.  Mobile          [phone]      MESSAGE:     Patient's wife left a voicemail requesting an appt with Dr Berg for today.    She says he is showing some signs of Neurological problems.      Please Advise:  [phone]

## 2024-12-30 NOTE — ED PROVIDER NOTES
Source of History:  Patient, daughter, chart    Chief complaint:  slurred speech (Slurred speech with L eye vision change x1 week. Pt also reports loss of balance over past week.) and Blurred Vision (Pt endorses worsening blurry vision and slurred speech for the past 2 weeks. Pt's daughter also endorses slight weakness and loss of balance due to vision changes. )      HPI:  Joel Deluca is a 59 y.o. 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  Patient states he feels like he is at his baseline but daughter states that symptoms have worsened over the past week.    This is the extent to the patients complaints today here in the emergency department.    ROS: As per HPI and below:  Constitutional: No fever.  No chills.  Eyes: No visual changes.  ENT: No sore throat. No ear pain    Cardiovascular: No chest pain.  Respiratory: No shortness of breath.  GI: No abdominal pain.  No nausea or vomiting.  Genitourinary: No abnormal urination.  Neurologic:  Positive for slurred speech.  Positive for visual disturbances.  Positive for loss of balance  MSK: no back pain.  Integument: No rashes or lesions.  Hematologic: No easy bruising.  Endocrine: No excessive thirst or urination.    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8 °C) (Oral)   Resp 14   Ht 5' 6" (1.676 m)   Wt 93 kg (205 lb 0.4 oz)   SpO2 95%   BMI 33.09 kg/m²   Nursing note and vital signs reviewed.  Constitutional: No acute distress.  Nontoxic  Eyes: No conjunctival injection.  Extraocular muscles are intact.  ENT: Oropharynx clear.    Cardiovascular: Regular rate and rhythm.  No murmurs. No gallops. No rubs.  Respiratory: Clear to auscultation bilaterally.  Good air movement.  No wheezes.  No rhonchi. No rales. No accessory muscle use.  Abdomen: Soft.  Not distended.  Nontender.  No guarding.  No rebound. Non-peritoneal.  Musculoskeletal: Good range of motion all joints.  No deformities.  Neck supple.  No meningismus.  Skin: No rashes seen.  Good turgor.  No abrasions.  No ecchymoses.  Neurologic: Slurred speech noted on exam.  Unsteady gait noted on exam.  Cranial nerve 4 palsy noted  Psych:  Appropriate, conversant    MDM    Emergent evaluation of a [age] yo male presenting for gait abnormality and slurred speech.  Patient has history of craniotomy in 2018 and his symptoms have been improving.  Patient's daughter states over the past week he has had worsening gait abnormalities and slurred speech.  Patient states I feel like I have does plateaued.  Patient denies any altered mental status, dizziness or weakness..  On exam pt is A&Ox3. VSS. Nonfebrile and nontoxic appearing.  Patient unable to ambulate in a straight line.  Slurred speech noted on exam.  Cranial nerve for palsy noted.  Pupils equal round reactive 3-2 mm.  No nystagmus.  Mucous membranes pink and moist. Pt speaking in full sentences.  Cap refill < 3 seconds.      History Acquisition "   Additional history was acquired from other historians.  Daughter, chart    The patient's list of active medical problems, social history, medications, and allergies as documented per RN staff has been reviewed.     Differential Diagnoses   Based on available information and the initial assessment, the working differential diagnoses considered during this evaluation include but are not limited to stroke, SAH, SDH, tumor, acute hemorrhage, electrolyte abnormality, others.    I will get labs, imaging and reassess.  I discussed this case with my supervising physician.      LABS     Labs Reviewed   CBC W/ AUTO DIFFERENTIAL - Abnormal       Result Value    WBC 8.11      RBC 4.49 (*)     Hemoglobin 14.1      Hematocrit 40.0      MCV 89      MCH 31.4 (*)     MCHC 35.3      RDW 13.0      Platelets 259      MPV 8.6 (*)     Immature Granulocytes 0.4      Gran # (ANC) 5.3      Immature Grans (Abs) 0.03      Lymph # 1.8      Mono # 0.8      Eos # 0.2      Baso # 0.04      nRBC 0      Gran % 65.0      Lymph % 22.6      Mono % 9.2      Eosinophil % 2.3      Basophil % 0.5      Differential Method Automated     COMPREHENSIVE METABOLIC PANEL    Sodium 141      Potassium 3.9      Chloride 107      CO2 24      Glucose 96      BUN 15      Creatinine 1.0      Calcium 10.0      Total Protein 7.8      Albumin 4.3      Total Bilirubin 0.6      Alkaline Phosphatase 47      AST 18      ALT 18      eGFR >60      Anion Gap 10     PROTIME-INR    Prothrombin Time 11.4      INR 1.0     TSH    TSH 3.825     APTT    aPTT 27.8     POCT GLUCOSE    POCT Glucose 98     ISTAT CREATININE    POC Creatinine 1.0      Sample VENOUS           Imaging     Imaging Results               MRI Brain Without Contrast (Final result)  Result time 12/30/24 16:49:31      Final result by Aaron Davenport MD (12/30/24 16:49:31)                   Impression:      See above comments.  Neuro consultation may be warranted.  Recommend follow-up.    This report was flagged in  Epic as abnormal.      Electronically signed by: Aaron Queen  Date:    12/30/2024  Time:    16:49               Narrative:    EXAMINATION:  MRI BRAIN WITHOUT CONTRAST    CLINICAL HISTORY:  Mental status change, unknown cause;.    TECHNIQUE:  Multiplanar multisequence MR imaging of the brain was performed without contrast.    COMPARISON:  12/30/2024, 02/02/2018, 03/01/2018, MRI 05/24/2018    FINDINGS:  Intracranial compartment:    Ventricles and sulci are normal in size for age without evidence of hydrocephalus. No extra-axial blood or fluid collections.    History of dorsal midbrain cavernous malformation with prior resection.  Craniotomy defect on the left.    There is 12 mm minimal increased focus of T1 and T2 signal at the dorsal margin of the kim on the left.  This is minimally increased in size compared to the study 05/24/2018.  Mild low signal hemosiderin ring.  Findings could represent minimal postoperative calcification.  The findings are suspicious for a small acute hemorrhagic component with increased T1 signal present, new from the prior MRI 05/24/2018.  Recommend follow-up.    Increased diffusion signal in this region may be associated with T2 shine through, though slightly diminished ADC findings are noted in this region.  Minimal superimposed lacunar infarct is not excluded.    No significant mass effect or edema.    Minimal adjacent probable smaller cavernous malformation in the left middle cerebellar peduncle similar to the prior study.    No edema or mass effect.    No significant abnormality elsewhere.    Normal vascular flow voids are preserved.    Skull/extracranial contents (limited evaluation): Bone marrow signal intensity is normal.                                        CTA Head and Neck (xpd) (Final result)  Result time 12/30/24 12:21:53      Final result by Bari Card DO (12/30/24 12:21:53)                   Impression:      CTA head: Unremarkable CTA of the head specifically  without evidence for proximal significant stenosis or proximal large vessel occlusion.    CTA neck: No significant arterial stenosis throughout the neck.    CT head: Operative change from remote left dorsal midbrain cavernous malformation resection.  There is thin incomplete rim of hyperdensity along the margin the resection cavity likely represents dystrophic calcification recent hemorrhage felt less likely however cannot be entirely excluded without more recent imaging for comparison..  Clinical correlation and further evaluation with MRI advised if patient compatible.      Electronically signed by: Bari Card DO  Date:    12/30/2024  Time:    12:21               Narrative:    EXAMINATION:  CTA HEAD AND NECK (XPD)    CLINICAL HISTORY:  Dizziness, persistent/recurrent, cardiac or vascular cause suspected;    TECHNIQUE:  5 mm axial images of the head pre and post contrast with 0.625 mm axial CTA images of the head neck post-contrast.  Coronal and sagittal MPR and MIP imaging was performed 100 ml of Omnipaque 350 contrast was injected intravenously    COMPARISON:  MRI 05/24/2018    FINDINGS:  CT head with and  without contrast: Remote operative change from left temporal craniotomy with history of left dorsal midbrain cavernous malformation resection.  There is peripheral hyperdensity suggestive for dystrophic calcification along the dorsal left midbrain.  Please note component of recent hemorrhage cannot be entirely excluded although felt less likely.  No sulcal effacement to suggest large territory recent infarction..  The ventricles are normal in size and configuration without evidence for hydrocephalus.  There is no midline shift or mass effect.  Allowing for CT technique there is no abnormal parenchymal enhancement.  Patchy mild moderate opacities right posterior ethmoid air cells trace mucosal thickening left sphenoid sinus..    CTA head:    Anterior circulation: The bilateral distal cervical, petrous,  cavernous, and supraclinoid segments of the ICAs are patent without significant focal stenosis or aneurysm.    The anterior middle cerebral arteries are patent without focal stenosis or aneurysm.    Posterior circulation: The distal vertebral arteries, basilar artery and posterior cerebral arteries are patent without focal stenosis or aneurysm.  There is developmental hypoplasia or absence of the right P1 segment of the PCA with essentially persistent fetal circulation of the right PCA via right posterior communicating artery.    CTA neck: Visualized arch and origin great vessels from the arch are within normal limits without significant focal stenosis..    Atherosclerotic plaquing origin left vertebral artery from the subclavian artery with mild narrowing right vertebral artery origin within normal limits.  The vertebral arteries are patent throughout their course without significant focal stenosis or occlusion.  There is atherosclerotic plaquing in the right mid V4 segment vertebral artery with mild narrowing.    Right carotid: The right common carotid artery, carotid bifurcation and extracranial portions of the internal carotid artery are patent without significant focal stenosis.    Left carotid: The left common carotid artery, carotid bifurcation and extracranial portions of the internal carotid arteries are patent without significant focal stenosis.    Mild irregularity of the carotid bifurcations and proximal ICAs most compatible with atherosclerotic plaquing there is less than 50% stenosis of the proximal ICAs by NASCET criteria.    Pharynx/larynx: Evaluation distorted by scatter artifacts from dental metal and motion within limits of the study there is no definite focal lesion throughout the pharynx/larynx.    Oral cavity and the buccal space     severely distorted by dental metal.    Glands: Bilateral parotid and submandibular glands are within normal limits. Thyroid gland is unremarkable.    No evidence  for adenopathy throughout the neck by size criteria.    Degenerative change cervical spine with superimposed remote operative change from C5/C6 anterior spinal fusion with osseous bridging across the disc space at this level    There is probable developmental variant with heterogeneous enlargement of the T3 spinous process.    This report was flagged in Epic as abnormal.                                      A radiology report was available for my review at the time of the encounter.    EKG        Additional Consideration   All available testing was considered during the course of this workup.  Comorbidities taken into consideration during the patient's evaluation and treatment include weight, age.    Social determinants of health were taken into consideration during development of our treatment plan.    Medications   iohexoL (OMNIPAQUE 350) injection 100 mL (100 mLs Intravenous Given 12/30/24 1154)      ED Course as of 12/31/24 0553   Mon Dec 30, 2024   1225 CBC unremarkable.  No leukocytosis noted.  H&H stable at 14.1 and 40.  CMP unremarkable.  TSH within normal limits.  APTT and INR within normal limits.  CT head independently reviewed by myself and radiology.  CTA head shows operative change from remote left dorsal midbrain cavernous malformation resection.  There is thin incomplete rim of hyperdensity along the margin the resection cavity likely represents dystrophic calcification recent hemorrhage felt less likely.  Recommends MRI brain.  MRI brain order placed.  Awaiting results.   [RZ]   1705 MRI independently reviewed by myself and Radiology.  MRI shows 2 mm minimal increased focus of T1 and T2 signal at the dorsal margin of the kim on the left.  This is minimally increased in size compared to the study 05/24/2018.  Mild low signal hemosiderin ring.  Findings could represent minimal postoperative calcification.  The findings are suspicious for a small acute hemorrhagic component with increased T1 signal  present.  Will discussed case with Neurosurgery for recommendations.  Awaiting callback from transfer center. [RZ]   0525 Discussed case with Dr. Davidson, neurosurgery.  Recommends transfer to [facility] ED for neurosurgery evaluation.  Patient reassessed.  Patient has continued slurred speech and gait abnormality.  Agreeable to transfer.  Awaiting transfer. [RZ]   2804 Critical Care:   Critical Care Times:   Direct Patient Care (initial evaluation, reassessments, and time considering the case)................................................................10 minutes.   Additional History from reviewing old medical records or taking additional history from the family, EMS, PCP, etc......................10 minutes.   Ordering, Reviewing, and Interpreting Diagnostic Studies.............................................................................................................10 minutes.   Documentation................................................................................................................................................................................10 minutes.   ==============================================================  · Total Critical Care Time - exclusive of procedural time: 40 minutes.  ==============================================================  Critical care was necessary to treat or prevent imminent or life-threatening deterioration of the following conditions:  Slurred speech, gait abnormality, acute hemorrhage at T1.   Critical care was time spent personally by me on the following activities: obtaining history from patient or relative, examination of patient, review of x-rays / CT sent with the patient, ordering lab, x-rays, and/or EKG, development of treatment plan with patient or relative, ordering and performing treatments and interventions, interpretation of cardiac measurements and re-evaluation of patient's conition.   Critical Care Condition: potentially  life-threatening  [RZ]   2112 New vertical diplopia x 2 weeks, worsening slurred speech and worsening depth perception and gait abnormality. [NN]   2113 History of brainstem in hemorrhage status post craniotomy several years ago with residual left-sided incoordination and weakness in the left upper extremity and mild slurred speech [NN]   2113 Palsy known cranial nerve 4 palsy [NN]   2113 EKG negative for STEMI, rate 60, normal sinus rhythm [NN]      ED Course User Index  [NN] Gissel Brito MD  [RZ] Niru Fink NP             CLINICAL IMPRESSION  1. Slurred speech    2. Acute focal neurological deficit    3. Gait disturbance    4. Status post craniotomy    5. Abnormal brain MRI    6. Cavernous malformation    7. Diplopia         ED Disposition Condition    Discharge Stable          This note was created using dictation software.  This program may occasionally mistype words and phrases.       Niru Fink NP  12/30/24 1809       Ted Hyde MD  12/31/24 0564

## 2024-12-31 NOTE — CONSULTS
[facility] - Emergency Dept  Neurosurgery  Consult Note    Inpatient consult to Neurosurgery  Consult performed by: Braydon Gonzalez MD  Consult ordered by: Carlos Campbell Jr., MD  Reason for consult: balance issues and vision changes        Subjective:     Chief Complaint/Reason for Admission: pontine cavernous malformation    History of Present Illness: Joel Deluca is a 60yo M with hx of L pontine cavernous malformation resected in 2018 at [facility] by Dr. Mejia who presents from OSH for one month of worsening balance and vision issues. Patient reports since his father  at the start of the month, he has had issues with walking and finding his balance. He has also reported seeing double when both of his eyes are open. Daughter at bedside reports speech is more slurred than normal, but patient also reports speech has been slurred since surgery in 2018. Also has resting L hand tremor that is stable since surgery. Reports no new numbness/weakness/tingling in arms or legs. Denies headache, denies n/v. SBP in 160s in ED. CTA H/N completed for stroke workup at OSH shows some possible calcification at dorsal L kim resection cavity. MRI brain wo contrast shows 12mm hyperintensity in dorsal L kim, mildly increased in size from May 2018 with ddx including postop calcification vs hemosiderin vs small acute hemorrhage; no edema or mass effect. NSGY consulted given these findings.      (Not in a hospital admission)      Review of patient's allergies indicates:  No Known Allergies    Past Medical History:   Diagnosis Date    Cerebral cavernous malformation     Cervical disc disease with myelopathy     Hyperlipidemia     Hypertension     Left-sided nontraumatic intracerebral hemorrhage of brainstem 2018    Status post craniotomy 2018     Past Surgical History:   Procedure Laterality Date    ANTERIOR CERVICAL DISCECTOMY W/ FUSION      BRAIN SURGERY      COLONOSCOPY N/A 5/3/2021    Procedure: COLONOSCOPY;  Surgeon:  Otis Martinez MD;  Location: [facility];  Service: Endoscopy;  Laterality: N/A;     Family History       Problem Relation (Age of Onset)    Heart disease Father    Hypertension Mother, Father          Tobacco Use    Smoking status: Never    Smokeless tobacco: Never   Substance and Sexual Activity    Alcohol use: Yes    Drug use: No    Sexual activity: Yes     Partners: Female     Review of Systems  Objective:     Weight: 93 kg (205 lb 0.4 oz)  Body mass index is 33.09 kg/m².  Vital Signs (Most Recent):  Temp: 98.2 °F (36.8 °C) (12/30/24 2015)  Pulse: 60 (12/30/24 2015)  Resp: 17 (12/30/24 2015)  BP: (!) 154/72 (12/30/24 2015)  SpO2: 96 % (12/30/24 2015) Vital Signs (24h Range):  Temp:  [97.8 °F (36.6 °C)-98.2 °F (36.8 °C)] 98.2 °F (36.8 °C)  Pulse:  [56-72] 60  Resp:  [15-19] 17  SpO2:  [95 %-97 %] 96 %  BP: (132-200)/(64-95) 154/72                Neurosurgery Physical Exam 12/30/2024      General:   Aox3  GCS [id number]    CNII-XII:   Intact on detailed exam  PERRL  Visual fields grossly intact, though patient subjectively reports double vision with both eyes open  EOMi  Facial sensation preserved  No facial assymetry  Tongue/uvula/palate midline  Shoulder shrug equal  No pronator drift    Extremities:   5/5 motor throughout  Sensorium intact throughout  Coordination intact throughout  DTRs 2+  No pathological reflexes  No sensory level present  Resting tremor in L hand    Significant Labs:  Recent Labs   Lab 12/30/24  1125   GLU 96      K 3.9      CO2 24   BUN 15   CREATININE 1.0   CALCIUM 10.0     Recent Labs   Lab 12/30/24  1125   WBC 8.11   HGB 14.1   HCT 40.0        Recent Labs   Lab 12/30/24  1125   INR 1.0   APTT 27.8     Microbiology Results (last 7 days)       ** No results found for the last 168 hours. **          All pertinent labs from the last 24 hours have been reviewed.    Significant Diagnostics:  I have reviewed all pertinent imaging results/findings within the past 24  hours.  Assessment/Plan:     Cavernous malformation  Joel Deluca is a 60yo M with hx of L pontine cavernous malformation resected in 2018 at [facility] by Dr. Mejia who presents from OSH for one month of worsening balance and vision issues. Patient reports since his father  at the start of the month, he has had issues with walking and finding his balance. He has also reported seeing double when both of his eyes are open. Daughter at bedside reports speech is more slurred than normal, but patient also reports speech has been slurred since surgery in 2018. Also has resting L hand tremor that is stable since surgery. Reports no new numbness/weakness/tingling in arms or legs. Denies headache, denies n/v. SBP in 160s in ED. CTA H/N completed for stroke workup at OSH shows some possible calcification at dorsal L kim resection cavity. MRI brain wo contrast shows 12mm hyperintensity in dorsal L kim, mildly increased in size from May 2018 with ddx including postop calcification vs hemosiderin vs small acute hemorrhage; no edema or mass effect. NSGY consulted given these findings.      CTA H/N : calcification at dorsal L kim  MRI brain w/wo : 12mm hyperintensity in dorsal L kim, mild increase from May 2018; likely postop calcification vs small acute hemorrhage; no edema or mass effect    Plan:  --NSGY seen at bedside  --all labs and diagnostics reviewed  --given that presentation is not acute, MRI findings better explained by hemosiderin deposits vs calcifications rather than acute stroke or hemorrhage  --NSGY to follow up in clinic next week with Dr. Davidson  --clinic date to be arranged by NSGY clinic manager  --okay to PA home from NSGY perspective    Discussed with on call staff Dr Davidson        Thank you for your consult. I will sign off. Please contact us if you have any additional questions.    Braydon Gonzalez MD  Neurosurgery  [facility] - Emergency Dept

## 2024-12-31 NOTE — HPI
Joel Deluca is a 60yo M with hx of L pontine cavernous malformation resected in 2018 at [facility] by Dr. Mejia who presents from OSH for one month of worsening balance and vision issues. Patient reports since his father  at the start of the month, he has had issues with walking and finding his balance. He has also reported seeing double when both of his eyes are open. Daughter at bedside reports speech is more slurred than normal, but patient also reports speech has been slurred since surgery in 2018. Also has resting L hand tremor that is stable since surgery. Reports no new numbness/weakness/tingling in arms or legs. Denies headache, denies n/v. SBP in 160s in ED. CTA H/N completed for stroke workup at OSH shows some possible calcification at dorsal L kim resection cavity. MRI brain wo contrast shows 12mm hyperintensity in dorsal L kim, mildly increased in size from May 2018 with ddx including postop calcification vs hemosiderin vs small acute hemorrhage; no edema or mass effect. NSGY consulted given these findings.

## 2024-12-31 NOTE — DISCHARGE INSTRUCTIONS
Follow up with your ophthalmologist and primary care doctors. We will send referrals to neurology and neurosurgery. Return to the ED for new or worsening symptoms

## 2024-12-31 NOTE — ASSESSMENT & PLAN NOTE
Joel Deluca is a 60yo M with hx of L pontine cavernous malformation [place] [facility] [place] who presents from OSH for one month of worsening balance and vision issues. Patient reports since his father  at the start of the month, he has had issues with walking and finding his balance. He has also reported seeing double when both of his eyes are open. Daughter at bedside reports speech is more slurred than normal, but patient also reports speech has been slurred since surgery in 2018. Also has resting L hand tremor that is stable since surgery. Reports no new numbness/weakness/tingling in arms or legs. Denies headache, denies n/v. SBP in 160s in ED. CTA H/N completed for stroke workup at OSH shows some possible calcification at dorsal L kim resection cavity. MRI brain wo contrast shows 12mm hyperintensity in dorsal L kim, mildly increased in size from May 2018 with ddx including postop calcification vs hemosiderin vs small acute hemorrhage; no edema or mass effect. NSGY consulted given these findings.      CTA H/N : calcification at dorsal L kim  MRI brain w/wo : 12mm hyperintensity in dorsal L kim, mild increase from May 2018; likely postop calcification vs small acute hemorrhage; no edema or mass effect    Plan:  --NSGY seen at bedside  --all labs and diagnostics reviewed  --given that presentation is not acute, MRI findings better explained by hemosiderin deposits vs calcifications rather than acute stroke or hemorrhage  --NSGY to follow up in clinic next week with Dr. Davidson  --clinic date to be arranged by NSGY clinic manager  --[facility] perspective    Discussed with on call staff Dr Davidson

## 2024-12-31 NOTE — ED PROVIDER NOTES
Encounter Date: 12/30/2024       History     Chief Complaint   Patient presents with    slurred speech     Slurred speech with L eye vision change x1 week. Pt also reports loss of balance over past week.    Blurred Vision     Pt endorses worsening blurry vision and slurred speech for the past 2 weeks. Pt's daughter also endorses slight weakness and loss of balance due to vision changes.      Patient is a 59-year-old male with past medical history significant for brainstem hemorrhage, AVM, hypertension who presents as transfer for evaluation of gait disturbance which has worsened over the last 2 weeks.  He notes an associated left-sided visual deficit and slurred speech.  Left-sided double vision.  CTA obtained at outside facility which was negative for acute process.  Seen at outside facility with MRI brain concerning for possible small acute intracranial hemorrhage.  Patient transferred for neurosurgical evaluation.  Denies any fever, chills, chest pain, shortness of breath, nausea, vomiting, numbness, weakness.    The history is provided by the patient.     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    Status post craniotomy 2/19/2018     Past Surgical History:   Procedure Laterality Date    ANTERIOR CERVICAL DISCECTOMY W/ FUSION      BRAIN SURGERY      COLONOSCOPY N/A 5/3/2021    Procedure: COLONOSCOPY;  Surgeon: Otis Martinez MD;  Location: [facility];  Service: Endoscopy;  Laterality: N/A;     Family History   Problem Relation Name Age of Onset    Hypertension Mother      Heart disease Father      Hypertension Father       Social History     Tobacco Use    Smoking status: Never    Smokeless tobacco: Never   Substance Use Topics    Alcohol use: Yes    Drug use: No      Review of Systems    Physical Exam     Initial Vitals   BP Pulse Resp Temp SpO2   12/30/24 1050 12/30/24 1048 12/30/24 1048 12/30/24 1051 12/30/24 1048   132/79 65 16 97.8 °F (36.6 °C) 97 %      MAP       --                Physical Exam    Nursing note and vitals reviewed.  Constitutional: He appears well-developed and well-nourished. No distress.   HENT:   Head: Normocephalic and atraumatic.   Eyes: Conjunctivae and EOM are normal. Pupils are equal, round, and reactive to light. No scleral icterus.   Vertical diplopia   Neck: Neck supple.   Normal range of motion.  Cardiovascular:  Normal rate, regular rhythm and intact distal pulses.           No murmur heard.  Pulmonary/Chest: Breath sounds normal. No respiratory distress. He has no wheezes. He has no rhonchi. He has no rales.   Abdominal: Abdomen is soft. He exhibits no distension. There is no abdominal tenderness.   Musculoskeletal:         General: No edema. Normal range of motion.      Cervical back: Normal range of motion and neck supple.     Neurological: He is alert and oriented to person, place, and time. He has normal strength. No sensory deficit.   Skin: Skin is warm and dry. Capillary refill takes less than 2 seconds.         ED Course   Procedures  Labs Reviewed   CBC W/ AUTO DIFFERENTIAL - Abnormal       Result Value    WBC 8.11      RBC 4.49 (*)     Hemoglobin 14.1      Hematocrit 40.0      MCV 89      MCH 31.4 (*)     MCHC 35.3      RDW 13.0      Platelets 259      MPV 8.6 (*)     Immature Granulocytes 0.4      Gran # (ANC) 5.3      Immature Grans (Abs) 0.03      Lymph # 1.8      Mono # 0.8      Eos # 0.2      Baso # 0.04      nRBC 0      Gran % 65.0      Lymph % 22.6      Mono % 9.2      Eosinophil % 2.3      Basophil % 0.5      Differential Method Automated     COMPREHENSIVE METABOLIC PANEL    Sodium 141      Potassium 3.9      Chloride 107      CO2 24      Glucose 96      BUN 15      Creatinine 1.0      Calcium 10.0      Total Protein 7.8       Albumin 4.3      Total Bilirubin 0.6      Alkaline Phosphatase 47      AST 18      ALT 18      eGFR >60      Anion Gap 10     PROTIME-INR    Prothrombin Time 11.4      INR 1.0     TSH    TSH 3.825     APTT    aPTT 27.8     POCT GLUCOSE    POCT Glucose 98     ISTAT CREATININE    POC Creatinine 1.0      Sample VENOUS          ECG Results              ECG 12 lead (Final result)        Collection Time Result Time QRS Duration [facility] QTC Calculation    12/30/24 11:24:20 12/30/24 13:36:14 100 412                     Final result by Interface, Lab In [facility] (12/30/24 13:36:20)                   Narrative:    Test Reason : R29.818,    Vent. Rate :  60 BPM     Atrial Rate :  60 BPM     P-R Int : 194 ms          QRS Dur : 100 ms      QT Int : 412 ms       P-R-T Axes :  45  68  35 degrees    QTcB Int : 412 ms    Normal sinus rhythm  Normal ECG    Confirmed by KAMALA Carmona ([id number]) on 12/30/2024 1:36:11 PM    Referred By: AAAREFERRAL SELF           Confirmed By: KAMALA Carmona                      Wet Read by Ted Hyde MD (12/30/24 11:33:51, [facility], Emergency Medicine)    My EKG interpretation, sinus rhythm, 60 beats per minute, normal axis, no ST segment changes, when compared to previous EKG January 16, 2018 no appreciable changes                                  Imaging Results               MRI Brain Without Contrast (Final result)  Result time 12/30/24 16:49:31      Final result by Aaron Queen MD (12/30/24 16:49:31)                   Impression:      See above comments.  Neuro consultation may be warranted.  Recommend follow-up.    This report was flagged in Epic as abnormal.      Electronically signed by: Aaron Queen  Date:    12/30/2024  Time:    16:49               Narrative:    EXAMINATION:  MRI BRAIN WITHOUT CONTRAST    CLINICAL HISTORY:  Mental status change, unknown cause;.    TECHNIQUE:  Multiplanar multisequence MR imaging of the brain was performed without  contrast.    COMPARISON:  12/30/2024, 02/02/2018, 03/01/2018, MRI 05/24/2018    FINDINGS:  Intracranial compartment:    Ventricles and sulci are normal in size for age without evidence of hydrocephalus. No extra-axial blood or fluid collections.    History of dorsal midbrain cavernous malformation with prior resection.  Craniotomy defect on the left.    There is 12 mm minimal increased focus of T1 and T2 signal at the dorsal margin of the kim on the left.  This is minimally increased in size compared to the study 05/24/2018.  Mild low signal hemosiderin ring.  Findings could represent minimal postoperative calcification.  The findings are suspicious for a small acute hemorrhagic component with increased T1 signal present, new from the prior MRI 05/24/2018.  Recommend follow-up.    Increased diffusion signal in this region may be associated with T2 shine through, though slightly diminished ADC findings are noted in this region.  Minimal superimposed lacunar infarct is not excluded.    No significant mass effect or edema.    Minimal adjacent probable smaller cavernous malformation in the left middle cerebellar peduncle similar to the prior study.    No edema or mass effect.    No significant abnormality elsewhere.    Normal vascular flow voids are preserved.    Skull/extracranial contents (limited evaluation): Bone marrow signal intensity is normal.                                        CTA Head and Neck (xpd) (Final result)  Result time 12/30/24 12:21:53      Final result by Bari Card DO (12/30/24 12:21:53)                   Impression:      CTA head: Unremarkable CTA of the head specifically without evidence for proximal significant stenosis or proximal large vessel occlusion.    CTA neck: No significant arterial stenosis throughout the neck.    CT head: Operative change from remote left dorsal midbrain cavernous malformation resection.  There is thin incomplete rim of hyperdensity along the margin the  resection cavity likely represents dystrophic calcification recent hemorrhage felt less likely however cannot be entirely excluded without more recent imaging for comparison..  Clinical correlation and further evaluation with MRI advised if patient compatible.      Electronically signed by: Bari Card DO  Date:    12/30/2024  Time:    12:21               Narrative:    EXAMINATION:  CTA HEAD AND NECK (XPD)    CLINICAL HISTORY:  Dizziness, persistent/recurrent, cardiac or vascular cause suspected;    TECHNIQUE:  5 mm axial images of the head pre and post contrast with 0.625 mm axial CTA images of the head neck post-contrast.  Coronal and sagittal MPR and MIP imaging was performed 100 ml of Omnipaque 350 contrast was injected intravenously    COMPARISON:  MRI 05/24/2018    FINDINGS:  CT head with and  without contrast: Remote operative change from left temporal craniotomy with history of left dorsal midbrain cavernous malformation resection.  There is peripheral hyperdensity suggestive for dystrophic calcification along the dorsal left midbrain.  Please note component of recent hemorrhage cannot be entirely excluded although felt less likely.  No sulcal effacement to suggest large territory recent infarction..  The ventricles are normal in size and configuration without evidence for hydrocephalus.  There is no midline shift or mass effect.  Allowing for CT technique there is no abnormal parenchymal enhancement.  Patchy mild moderate opacities right posterior ethmoid air cells trace mucosal thickening left sphenoid sinus..    CTA head:    Anterior circulation: The bilateral distal cervical, petrous, cavernous, and supraclinoid segments of the ICAs are patent without significant focal stenosis or aneurysm.    The anterior middle cerebral arteries are patent without focal stenosis or aneurysm.    Posterior circulation: The distal vertebral arteries, basilar artery and posterior cerebral arteries are patent without  focal stenosis or aneurysm.  There is developmental hypoplasia or absence of the right P1 segment of the PCA with essentially persistent fetal circulation of the right PCA via right posterior communicating artery.    CTA neck: Visualized arch and origin great vessels from the arch are within normal limits without significant focal stenosis..    Atherosclerotic plaquing origin left vertebral artery from the subclavian artery with mild narrowing right vertebral artery origin within normal limits.  The vertebral arteries are patent throughout their course without significant focal stenosis or occlusion.  There is atherosclerotic plaquing in the right mid V4 segment vertebral artery with mild narrowing.    Right carotid: The right common carotid artery, carotid bifurcation and extracranial portions of the internal carotid artery are patent without significant focal stenosis.    Left carotid: The left common carotid artery, carotid bifurcation and extracranial portions of the internal carotid arteries are patent without significant focal stenosis.    Mild irregularity of the carotid bifurcations and proximal ICAs most compatible with atherosclerotic plaquing there is less than 50% stenosis of the proximal ICAs by NASCET criteria.    Pharynx/larynx: Evaluation distorted by scatter artifacts from dental metal and motion within limits of the study there is no definite focal lesion throughout the pharynx/larynx.    Oral cavity and the buccal space     severely distorted by dental metal.    Glands: Bilateral parotid and submandibular glands are within normal limits. Thyroid gland is unremarkable.    No evidence for adenopathy throughout the neck by size criteria.    Degenerative change cervical spine with superimposed remote operative change from C5/C6 anterior spinal fusion with osseous bridging across the disc space at this level    There is probable developmental variant with heterogeneous enlargement of the T3 spinous  "process.    This report was flagged in Epic as abnormal.                                       Medications   iohexoL (OMNIPAQUE 350) injection 100 mL (100 mLs Intravenous Given 12/30/24 1154)     Medical Decision Making  Patient presents for evaluation of gait disturbance, diplopia, and slurred speech.  Differential diagnosis includes but is not limited to:  Stroke, SAH, SDH, EDH.  Patient presents as transfer for neurosurgical evaluation of "12mm hyperintensity in dorsal L kim, mild increase from May 2018; likely postop calcification vs small acute hemorrhage; no edema or mass effect" noted on outside MRI brain. MRI brain without evidence of ischemic stroke. Neurosurgery evaluated the patient at bedside and noted given the presentation is not acute, MRI findings better explained by hemosiderin deposits vs calcifications rather than acute stroke or hemorrhage. Patient with vertical diplopia on exam without other focal neurologic deficits.  Patient with history of cranial nerve four palsy. Ambulatory in the ED with steady gait. Concern for acute on chronic depth perception deficits as etiology of gait disturbance. Pt without slurred speech in the ED. Stable to discharge from neurosurgical standpoint. Pt has ophthalmology appointment tomorrow which we encouraged him to attend. Will send neurology and neurosurgery referrals as well. Pt stable to discharge to home. Return precautions advise d    Amount and/or Complexity of Data Reviewed  Labs: ordered.  Radiology: ordered.    Risk  Prescription drug management.               ED Course as of 12/31/24 0348   Mon Dec 30, 2024                   2112 New vertical diplopia x 2 weeks, worsening slurred speech and worsening depth perception and gait abnormality. [NN]   2113 History of brainstem hemorrhage status post craniotomy several years ago with residual left-sided incoordination and weakness in the left upper extremity and mild slurred speech [NN]   2113 Palsy known " cranial nerve 4 palsy [NN]   2113 EKG negative for STEMI, rate 60, normal sinus rhythm [NN]      ED Course User Index  [NN] Gissel Brito MD  [RZ] Niru Fink NP                           Clinical Impression:  Final diagnoses:  [R29.818] Acute focal neurological deficit  [R47.81] Slurred speech (Primary)  [R26.9] Gait disturbance  [Z98.890] Status post craniotomy  [R90.89] Abnormal brain MRI  [Q28.3] Cavernous malformation  [H53.2] Diplopia          ED Disposition Condition    Discharge Stable          ED Prescriptions    None       Follow-up Information       Follow up With Specialties Details Why Contact Info    Otis Martinez MD Family Medicine In 1 week  [address] [place] [address]  [phone]               Carlos Campbell Jr., MD  Resident  12/31/24 5518

## 2024-12-31 NOTE — ED NOTES
"Joel Deluca, a 59 y.o. male presents to the ED w/ complaint of slurred speech and blurred vision. Pt's daughter reports pt's "speech is usually slurred but now its more slurred than normal and he's walking more unsteady than normal." Pt reports double vision when both eyes are open x2 weeks. Pt states he had "brain surgery 7 years ago and this feels similar to that." Pt is AAOx4.     Triage note:  Chief Complaint   Patient presents with    slurred speech     Slurred speech with L eye vision change x1 week. Pt also reports loss of balance over past week.    Blurred Vision     Pt endorses worsening blurry vision and slurred speech for the past 2 weeks. Pt's daughter also endorses slight weakness and loss of balance due to vision changes.      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

## 2024-12-31 NOTE — SUBJECTIVE & OBJECTIVE
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    Status post craniotomy 2/19/2018     Past Surgical History:   Procedure Laterality Date    ANTERIOR CERVICAL DISCECTOMY W/ FUSION      BRAIN SURGERY      COLONOSCOPY N/A 5/3/2021    Procedure: COLONOSCOPY;  Surgeon: Otis Martinez MD;  Location: [facility];  Service: Endoscopy;  Laterality: N/A;     Family History       Problem Relation (Age of Onset)    Heart disease Father    Hypertension Mother, Father          Tobacco Use    Smoking status: Never    Smokeless tobacco: Never   Substance and Sexual Activity    Alcohol use: Yes    Drug use: No    Sexual activity: Yes     Partners: Female     Review of Systems  Objective:     Weight: 93 kg (205 lb 0.4 oz)  Body mass index is 33.09 kg/m².  Vital Signs (Most Recent):  Temp: 98.2 °F (36.8 °C) (12/30/24 2015)  Pulse: 60 (12/30/24 2015)  Resp: 17 (12/30/24 2015)  BP: (!) 154/72 (12/30/24 2015)  SpO2: 96 % (12/30/24 2015) Vital Signs (24h Range):  Temp:  [97.8 °F (36.6 °C)-98.2 °F (36.8 °C)] 98.2 °F (36.8 °C)  Pulse:  [56-72] 60  Resp:  [15-19] 17  SpO2:  [95 %-97 %] 96 %  BP: (132-200)/(64-95) 154/72                Neurosurgery Physical Exam 12/30/2024      General:   Aox3  GCS [id number]    CNII-XII:   Intact on detailed exam  PERRL  Visual fields grossly intact, though patient subjectively reports double vision with both eyes open  EOMi  Facial sensation preserved  No facial assymetry  Tongue/uvula/palate midline  Shoulder shrug equal  No pronator drift    Extremities:   5/5 motor throughout  Sensorium intact throughout  Coordination intact throughout  DTRs 2+  No pathological reflexes  No sensory level present  Resting tremor in L  hand    Significant Labs:  Recent Labs   Lab 12/30/24  1125   GLU 96      K 3.9      CO2 24   BUN 15   CREATININE 1.0   CALCIUM 10.0     Recent Labs   Lab 12/30/24  1125   WBC 8.11   HGB 14.1   HCT 40.0        Recent Labs   Lab 12/30/24  1125   INR 1.0   APTT 27.8     Microbiology Results (last 7 days)       ** No results found for the last 168 hours. **          All pertinent labs from the last 24 hours have been reviewed.    Significant Diagnostics:  I have reviewed all pertinent imaging results/findings within the past 24 hours.

## 2025-01-07 ENCOUNTER — OFFICE VISIT (OUTPATIENT)
Dept: NEUROSURGERY | Facility: CLINIC | Age: 60
End: 2025-01-07
Payer: COMMERCIAL

## 2025-01-07 DIAGNOSIS — R90.89 ABNORMAL BRAIN MRI: ICD-10-CM

## 2025-01-07 DIAGNOSIS — Q28.3 CEREBRAL CAVERNOUS MALFORMATION: Primary | ICD-10-CM

## 2025-01-07 PROCEDURE — 99999 PR PBB SHADOW E&M-EST. PATIENT-LVL II: CPT | Mod: PBBFAC,,, | Performed by: NEUROLOGICAL SURGERY

## 2025-01-07 PROCEDURE — 99204 OFFICE O/P NEW MOD 45 MIN: CPT | Mod: S$GLB,,, | Performed by: NEUROLOGICAL SURGERY

## 2025-01-07 NOTE — PROGRESS NOTES
Subjective:   I, Joel Limon, attest that this documentation has been prepared under the direction and in the presence of Will Davidson MD.     Patient ID: Joel Deluca is a 59 y.o. male     Chief Complaint: No chief complaint on file.      HPI  Mr. Joel Deluca is a 59 y.o. gentleman with a h/o cervical disc disease with myelopathy, HLD, HTN, and cerebral cavernous malformation who presents to the NSGY clinic today to establish care. This patient had recently presented to the ED with a chief complaint of progressively worsening gait disturbance with associated left sided double vision, and slurred speech over the past 2 weeks on 12/30/24. Upon further diagnostics, an MRI brain WO contrast done on 12/30/24 demonstrated a 12 mm minimal increased focus of T1 and T2 signal at the dorsal margin of the kim on the left. This is minimally increased in size compared to the study 05/24/2018. There was no mass effect or edema seen during this time. The patient was discharged to home when deemed stable with the appropriate referrals placed for neurology and NSGY. He is here to discuss possible neurosurgical intervention.     This patient reports that he was in his normal state of health until several weeks ago when he noticed double vision in the vertical direction.  He became aware of this when he was out hunting.  He reports that since that time it has remained stable.  He reports that he has pretty good vision in each eye.  He has been seen by his own ophthalmologist who attempted to correct this with prisms.  This did not work.  He is wearing an eye patch today.    Review of Systems   Constitutional:  Negative for activity change, appetite change, fatigue, fever and unexpected weight change.   HENT:  Negative for facial swelling.    Eyes: Negative.    Respiratory: Negative.     Cardiovascular: Negative.    Gastrointestinal:  Negative for diarrhea, nausea and vomiting.   Endocrine: Negative.    Genitourinary:  Negative.    Musculoskeletal:  Negative for back pain, joint swelling, myalgias and neck pain.   Neurological:  Negative for dizziness, seizures, weakness, numbness and headaches.   Psychiatric/Behavioral: Negative.          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   Physical Exam  Constitutional:       General: He is not in acute distress.     Appearance: Normal appearance.   HENT:      Head: Normocephalic and atraumatic.   Pulmonary:      Effort: Pulmonary effort is normal.   Musculoskeletal:      Cervical back: Neck supple.   Neurological:      Mental Status: He is alert and oriented to person, place, and time.      GCS: GCS eye subscore is 4. GCS verbal subscore is 5. GCS motor subscore is 6.      Cranial Nerves: No cranial nerve deficit.     His extraocular movements are intact    IMAGING:  MRI Brain Without Contrast 12/30/2024:  Intracranial compartment: Ventricles and sulci are normal in size for age without evidence of hydrocephalus. No extra-axial blood or fluid collections. History of dorsal midbrain cavernous malformation with prior resection.  Craniotomy defect on the left. There is 12 mm minimal increased focus of T1 and T2 signal at the dorsal margin of the kim on the left.  This is minimally increased in size compared to the study 05/24/2018.  Mild low signal hemosiderin ring.  Findings could represent minimal postoperative calcification.  The findings are suspicious for a small acute hemorrhagic component with increased T1 signal present, new from the prior MRI 05/24/2018.  Recommend follow-up. Increased diffusion signal in this region may be associated with T2 shine through, though slightly diminished ADC findings are  noted in this region.  Minimal superimposed lacunar infarct is not excluded. No significant mass effect or edema. Minimal adjacent probable smaller cavernous malformation in the left middle cerebellar peduncle similar to the prior study. No edema or mass effect. No significant abnormality elsewhere. Normal vascular flow voids are preserved. Skull/extracranial contents (limited evaluation): Bone marrow signal intensity is normal.      I have personally reviewed the images with the pt.      I, Dr. Will Davidson, personally performed the services described in this documentation. All medical record entries made by the scribe, Joel Limon, were at my direction and in my presence.  I have reviewed the chart and agree that the record reflects my personal performance and is accurate and complete. Will Davidson MD. 01/06/2025    Assessment:       Cavernous malformation     Plan:   I have personally reviewed the MRI Brain Without Contrast with the pt which shows:  Intracranial compartment: Ventricles and sulci are normal in size for age without evidence of hydrocephalus. No extra-axial blood or fluid collections. History of dorsal midbrain cavernous malformation with prior resection.  Craniotomy defect on the left. There is 12 mm minimal increased focus of T1 and T2 signal at the dorsal margin of the kim on the left.  This is minimally increased in size compared to the study 05/24/2018.  Mild low signal hemosiderin ring.  Findings could represent minimal postoperative calcification.  The findings are suspicious for a small acute hemorrhagic component with increased T1 signal present, new from the prior MRI 05/24/2018.  Recommend follow-up. Increased diffusion signal in this region may be associated with T2 shine through, though slightly diminished ADC findings are noted in this region.  Minimal superimposed lacunar infarct is not excluded. No significant mass effect or edema. Minimal adjacent probable smaller cavernous  malformation in the left middle cerebellar peduncle similar to the prior study. No edema or mass effect. No significant abnormality elsewhere. Normal vascular flow voids are preserved. Skull/extracranial contents (limited evaluation): Bone marrow signal intensity is normal.    This is a patient who had the unsuccessful removal of a brainstem cavernous malformation in 2018.  He returns today with 3 hemorrhage in this area.  I have counseled him that I would not recommend the surgery at this time.  I would like for him to get better and we could discuss his options in 3 months.

## 2025-02-25 ENCOUNTER — OFFICE VISIT (OUTPATIENT)
Dept: NEUROLOGY | Facility: CLINIC | Age: 60
End: 2025-02-25
Payer: COMMERCIAL

## 2025-02-25 VITALS
SYSTOLIC BLOOD PRESSURE: 138 MMHG | DIASTOLIC BLOOD PRESSURE: 98 MMHG | WEIGHT: 211 LBS | HEIGHT: 66 IN | OXYGEN SATURATION: 97 % | RESPIRATION RATE: 20 BRPM | BODY MASS INDEX: 33.91 KG/M2 | HEART RATE: 59 BPM

## 2025-02-25 DIAGNOSIS — R26.9 GAIT DISTURBANCE: ICD-10-CM

## 2025-02-25 DIAGNOSIS — Q28.3 CAVERNOUS MALFORMATION: Primary | ICD-10-CM

## 2025-02-25 DIAGNOSIS — H53.2 DIPLOPIA: ICD-10-CM

## 2025-02-25 PROCEDURE — 99999 PR PBB SHADOW E&M-EST. PATIENT-LVL III: CPT | Mod: PBBFAC,,, | Performed by: PSYCHIATRY & NEUROLOGY

## 2025-02-25 NOTE — PROGRESS NOTES
Consult from Dr. Campbell    HPI: Joel Deluca is a 60 y.o. male here for evaluation of double vision and gait changes     This worsened over a month prior to presentation in 12/2024 to an ER    He has a history of  unsuccessful removal of a brainstem cavernous malformation in 2018.    He was found to have hemorrhage in this area again in 12/2024.    He has been seen by Dr Davidson in [facility] in follow up since ER discharge    This appointment was made in the ER by ER MD prior to discharged.      He continues with balance changes and double vision from either eye when both eyes are openg.     He does not feel improved.      He has not been falling    No difficulty with swallowing    Not in any PT.  He notes some quick balance difficulty with balance only.     He does think he needs PT    He is using a patch as closing one eye helps   Not on asprin    No longer working longer term/ retired           Review of Systems   Constitutional:  Negative for fever.   HENT:  Negative for nosebleeds.    Eyes:  Positive for double vision.   Respiratory:  Negative for hemoptysis.    Cardiovascular:  Negative for leg swelling.   Gastrointestinal:  Negative for blood in stool.   Genitourinary:  Negative for hematuria.   Musculoskeletal:  Negative for falls.   Skin:  Negative for rash.   Neurological:  Positive for tremors.        He has had a tremor since first presenting in 2018 in the left hand   Endo/Heme/Allergies:  Does not bruise/bleed easily.         I have reviewed all of this patient's past medical and surgical histories as well as family and social histories and active allergies and medications as documented in the electronic medical record.        Exam:  Gen Appearance, well developed/nourished in no apparent distress  CV: 2+ distal pulses with no edema or swelling  Neuro:  MS: Awake, alert, oriented to place, person, time, situation. Sustains attention. Recent/remote memory intact, Language is full to spontaneous  speech/repetition/naming/comprehension. Fund of Knowledge is full  Mildly slurred speech  CN: Optic discs are flat with normal vasculature, PERRL, Extraoccular movements and visual fields are full but diplopia in all fields. Normal facial sensation and strength, Hearing symmetric, Tongue and Palate are midline and strong. Shoulder Shrug symmetric and strong.  Motor: Normal bulk, tone, no abnormal movements. 5/5 strength bilateral upper/lower extremities with 2+ reflexes and bilateral plantar response  Sensory: symmetric to  temp, and vibration,  Romberg negative  Cerebellar: Finger-nose dysmetric on the left side  Gait: Normal stance, no ataxia    Imagin/2024 MRI brain: There is 12 mm minimal increased focus of T1 and T2 signal at the dorsal margin of the kim on the left.  This is minimally increased in size compared to the study 2018.  Mild low signal hemosiderin ring.  Findings could represent minimal postoperative calcification.  The findings are suspicious for a small acute hemorrhagic component with increased T1 signal present, new from the prior MRI 2018.  Recommend follow-up. Increased diffusion signal in this region may be associated with T2 shine through, though slightly diminished ADC findings are noted in this region.  Minimal superimposed lacunar infarct is not excluded.        Labs:      Assessment/Plan: Joel Deluca is a 60 y.o. male with history of  unsuccessful removal of a brainstem cavernous malformation in 2018 who presented with double vision and gait changes in 2024  I recommend:     2024 MRI showed increase and size and like acute hemorrhage in the prior known cavernoma    double vision and gait changes     2.  The patient is seeing Dr Davidson who recommended recovery from acute symptoms prior to any consideration of repeat surgery    3. I offered him PT for his balance symptoms  -He declines at this time/ can notify me if desired   -To the ER for any worsening  neurological symptoms or headaches    He will follow up with Dr Davidson as planned in April       RTC  PRN    CC: Dr. Campbell

## 2025-02-26 ENCOUNTER — OFFICE VISIT (OUTPATIENT)
Dept: INTERNAL MEDICINE | Facility: CLINIC | Age: 60
End: 2025-02-26
Payer: COMMERCIAL

## 2025-02-26 ENCOUNTER — LAB VISIT (OUTPATIENT)
Dept: LAB | Facility: HOSPITAL | Age: 60
End: 2025-02-26
Attending: FAMILY MEDICINE
Payer: COMMERCIAL

## 2025-02-26 VITALS
RESPIRATION RATE: 18 BRPM | HEIGHT: 66 IN | DIASTOLIC BLOOD PRESSURE: 72 MMHG | OXYGEN SATURATION: 95 % | WEIGHT: 208.75 LBS | BODY MASS INDEX: 33.55 KG/M2 | HEART RATE: 65 BPM | SYSTOLIC BLOOD PRESSURE: 138 MMHG

## 2025-02-26 DIAGNOSIS — Q28.3 CAVERNOUS MALFORMATION: ICD-10-CM

## 2025-02-26 DIAGNOSIS — Z23 NEED FOR VACCINATION: ICD-10-CM

## 2025-02-26 DIAGNOSIS — E78.2 MIXED HYPERLIPIDEMIA: ICD-10-CM

## 2025-02-26 DIAGNOSIS — H49.12 LEFT-SIDED FOURTH CRANIAL NERVE PALSY: ICD-10-CM

## 2025-02-26 DIAGNOSIS — E78.5 HYPERLIPIDEMIA, UNSPECIFIED HYPERLIPIDEMIA TYPE: ICD-10-CM

## 2025-02-26 DIAGNOSIS — I10 ESSENTIAL HYPERTENSION: Primary | ICD-10-CM

## 2025-02-26 DIAGNOSIS — G47.33 OSA (OBSTRUCTIVE SLEEP APNEA): ICD-10-CM

## 2025-02-26 LAB
CHOLEST SERPL-MCNC: 179 MG/DL (ref 120–199)
CHOLEST/HDLC SERPL: 4.5 {RATIO} (ref 2–5)
ESTIMATED AVG GLUCOSE: 100 MG/DL (ref 68–131)
HBA1C MFR BLD: 5.1 % (ref 4–5.6)
HDLC SERPL-MCNC: 40 MG/DL (ref 40–75)
HDLC SERPL: 22.3 % (ref 20–50)
LDLC SERPL CALC-MCNC: 106.8 MG/DL (ref 63–159)
NONHDLC SERPL-MCNC: 139 MG/DL
TRIGL SERPL-MCNC: 161 MG/DL (ref 30–150)

## 2025-02-26 PROCEDURE — 99999 PR PBB SHADOW E&M-EST. PATIENT-LVL IV: CPT | Mod: PBBFAC,,, | Performed by: FAMILY MEDICINE

## 2025-02-26 PROCEDURE — 90471 IMMUNIZATION ADMIN: CPT | Mod: S$GLB,,, | Performed by: FAMILY MEDICINE

## 2025-02-26 PROCEDURE — 36415 COLL VENOUS BLD VENIPUNCTURE: CPT | Performed by: FAMILY MEDICINE

## 2025-02-26 PROCEDURE — 99214 OFFICE O/P EST MOD 30 MIN: CPT | Mod: 25,S$GLB,, | Performed by: FAMILY MEDICINE

## 2025-02-26 PROCEDURE — 83036 HEMOGLOBIN GLYCOSYLATED A1C: CPT | Performed by: FAMILY MEDICINE

## 2025-02-26 PROCEDURE — 90677 PCV20 VACCINE IM: CPT | Mod: S$GLB,,, | Performed by: FAMILY MEDICINE

## 2025-02-26 PROCEDURE — 80061 LIPID PANEL: CPT | Performed by: FAMILY MEDICINE

## 2025-02-26 NOTE — PROGRESS NOTES
HPI: Joel Deluca is a 60 y.o. male here for checkup today.   His blood pressure is running well. He denies side effects such as dry cough, lightheadedness, fatigue. His cholesterol medications were well tolerated. He does not have any side effects such as myalgias. He denies fever chills weight loss or weight gain. He denies chest pain.  Patient recently had blood work done through work.  Lab results were reviewed.    Neck pain under control.   History of cervical fusion.  Gets paresthesia in right hand. pain comes and goes.  Overall he is much better.  He was diagnosed with a cavernous hemangioma in the brain.  In 2017 he underwent brain surgery to remove it.  He is doing remarkably well.  His speech has improved nicely.  He is no longer having double vision.  He was very hard of hearing.  He is having loud snoring and daytime sleepiness.  Has fitful sleep.  Now on CPAP at 19.  Not well tolerated.  He tries to use it.  History of Present Illness    CHIEF COMPLAINT:  Patient presents today with double vision.    NEUROLOGICAL SYMPTOMS:  He has had double vision since December with associated gait changes. He manages the symptoms by using an eye patch to cover one eye, which allows him to see single images. His symptoms have remained stable since December with some adaptation, noting improved ability to walk and perform activities.    DRIVING IMPACT:  He can drive during daytime but avoids nighttime driving due to decreased depth perception and difficulty with turns.    BRAIN HISTORY:  He has a history of brain surgery in 2018. MRI showed increase in size and acute hemorrhage in prior known cavernoma with vessel bleeding recurrence causing pressure on optic nerve.    SLEEP APNEA:  He reports issues with his CPAP device due to  prescription, preventing him from obtaining necessary supplies. He currently uses a full face mask but is open to using any type of mask.    CURRENT MEDICATIONS:  He takes  Herbicartin 300, Enderol 80 mg, Lipitor, and Fibricor.      ROS:  General: -fever, -chills, -fatigue, -weight gain, -weight loss  Eyes: +vision changes, -redness, -discharge  ENT: -ear pain, -nasal congestion, -sore throat  Cardiovascular: -chest pain, -palpitations, -lower extremity edema  Respiratory: -cough, -shortness of breath  Gastrointestinal: -abdominal pain, -nausea, -vomiting, -diarrhea, -constipation, -blood in stool  Genitourinary: -dysuria, -hematuria, -frequency  Musculoskeletal: -joint pain, -muscle pain  Skin: -rash, -lesion  Neurological: -headache, -dizziness, -numbness, -tingling, +difficulty walking  Psychiatric: -anxiety, -depression, -sleep difficulty           PHYSICAL EXAM;   Vitals:    02/26/25 0910   BP: 138/72   Pulse:    Resp:      APPEARANCE: Well nourished, well developed, in no acute distress.   HEAD: Normocephalic, atraumatic.  EYES: PERRL. EOMI.   EARS: TM's intact. Light reflex normal. No retraction or perforation.  Very hard of hearing   NOSE: Mucosa pink. Airway clear.  MOUTH & THROAT: No tonsillar enlargement. No pharyngeal erythema or exudate. No stridor.  NECK: Supple.  No carotid bruits.  No JVD   CHEST: Lungs clear to auscultation.  CARDIOVASCULAR: Normal S1, S2. No rubs, murmurs or gallops.  MUSCULOSKELETAL: No clubbing cyanosis or edema  SKIN:  1 skin tag, 2 moles consistent with seborrheic keratoses present.  These were frozen with cryo  :  High riding testicles.  Able to easily palpate the Vas Deferens  NEUROLOGIC:    Cranial Nerves: III-XII grossly intact. still suffers with double vision    Motor: No focal deficits   MENTAL STATUS: Normal orientation to person, place and time, normal affect, normal flow thought, normal memory.    Lab Results   Component Value Date    LDLCALC 104.0 08/19/2024     Lab Results   Component Value Date     12/30/2024    K 3.9 12/30/2024     12/30/2024    CO2 24 12/30/2024    BUN 15 12/30/2024    CREATININE 1.0 12/30/2024     CALCIUM 10.0 12/30/2024    ANIONGAP 10 12/30/2024    ESTGFRAFRICA >60 02/21/2022    EGFRNONAA >60 02/21/2022     PSA, Screen (ng/mL)   Date Value   02/28/2024 1.8       ASSESSMENT/PLAN:    1. Essential hypertension  Overview:  Encouraged patient to avoid table salt and try to follow a 2 g sodium diet  Continue propranolol 80 mg daily  Continue Irbisartan to 300 mg daily  Walk 20 minutes per day.  Try to lose weight.      2. Hyperlipidemia, unspecified hyperlipidemia type  Overview:  Low fat diet. Avoid sweets. A 10 pound weight loss by the next visit as a  good goal. Increase consumption of fruits and vegetables, fish and chicken.  Continue Lipitor 20 mg daily, Trilipix 135 mg p.o. daily.    Orders:  -     Hemoglobin A1C; Future; Expected date: 02/26/2025  -     Lipid Panel; Future; Expected date: 02/26/2025    3. Mixed hyperlipidemia  Overview:  Low fat diet. Avoid sweets. A 10 pound weight loss by the next visit as a  good goal. Increase consumption of fruits and vegetables, fish and chicken.  Continue Lipitor 20 mg daily, Trilipix 135 mg p.o. daily.      4. NIA (obstructive sleep apnea)  Overview:  Advised him to continue CPAP at 19 cm pressure    Orders:  -     CPAP/BIPAP SUPPLIES    5. Left-sided fourth cranial nerve palsy    6. Cavernous malformation    Assessment & Plan    IMPRESSION:  - Evaluated patient's double vision, onset early December 2024  - Reviewed recent MRI findings: increase in size and likely acute hemorrhage in prior known cavernoma  - Considered Dr. Davidson's assessment of low pressure area in brain and decision to avoid surgery due to potential for increased damage  - Assessed blood pressure, noting slight elevation from patient's reported baseline    INTRACRANIAL HEMORRHAGE:  - Noted that the patient reports the vessel is starting to bleed again and putting pressure on the optic nerve.  - MRI revealed an increase in size and likely acute hemorrhage in the prior known cavernoma.  -   Corey evaluated the patient and noted old brain stem malformation and acute hemorrhage in the prior known area.  - Dr. Davidson assessed that there's a low pressure area in the brain and spine, and surgery might cause more damage than benefit.  - Will monitor the condition and wait to see if it heals without intervention.  - Noted that the condition is in the same area where surgery was previously performed.    DOUBLE VISION:  - Noted that the patient reports seeing double since the beginning of December.  - Confirmed double vision and gait changes from the hemorrhage.  - Assessed that the double vision is likely caused by the intracranial hemorrhage putting pressure on the optic nerve.  - Instructed the patient to use an eye patch as needed to manage double vision.  - Advised the patient to avoid driving at night due to decreased depth perception.    GAIT CHANGES:  - Noted that the patient reports difficulty walking and gait changes.  - Confirmed gait changes from the hemorrhage.  - Assessed that the gait changes are likely related to the intracranial hemorrhage.  - Observed that the patient is adapting to the condition and reports improved mobility.    SLEEP APNEA:  - Faxed CPAP supplies prescription to equipment provider.  - Noted that the patient uses CPAP for sleep apnea with a high pressure setting.  - Acknowledged the need for CPAP supplies and prescription renewal.  - Prescribed a new prescription for CPAP supplies, including a full face mask.    HYPERTENSION:  - Emphasized the importance of regular blood pressure monitoring at home.  - Instructed the patient to monitor blood pressure at home once a week.  - Continued Irbesartan 300 mg.  - Continued Enderol 80 mg.  - Noted that the patient reports blood pressure usually runs around 138/70.  - Observed elevated blood pressure during the visit.    HYPERLIPIDEMIA:  - Continued Lipitor.  - Continued Fibricor.  - Ordered cholesterol level test.    LABS:  -  Recommend cholesterol and A1C tests.  - Noted that the patient's last labs was in December.  - A1C ordered.    SHINGLES VACCINATION:  - Discussed need for shingles vaccine.  - Patient to get shingles vaccine at the pharmacy.    PNEUMONIA VACCINATION:  - Discussed need for pneumonia vaccine.  - Pneumonia vaccine administered in office.       Next appointment will be in 6 months.

## 2025-02-27 ENCOUNTER — RESULTS FOLLOW-UP (OUTPATIENT)
Dept: HEPATOLOGY | Facility: HOSPITAL | Age: 60
End: 2025-02-27

## 2025-02-27 ENCOUNTER — PATIENT MESSAGE (OUTPATIENT)
Dept: PSYCHIATRY | Facility: CLINIC | Age: 60
End: 2025-02-27
Payer: COMMERCIAL

## 2025-04-17 ENCOUNTER — PATIENT MESSAGE (OUTPATIENT)
Dept: NEUROSURGERY | Facility: CLINIC | Age: 60
End: 2025-04-17
Payer: COMMERCIAL

## 2025-04-17 ENCOUNTER — TELEPHONE (OUTPATIENT)
Dept: NEUROSURGERY | Facility: CLINIC | Age: 60
End: 2025-04-17
Payer: COMMERCIAL

## 2025-04-22 ENCOUNTER — HOSPITAL ENCOUNTER (OUTPATIENT)
Dept: RADIOLOGY | Facility: HOSPITAL | Age: 60
Discharge: HOME | End: 2025-04-22
Attending: NEUROLOGICAL SURGERY
Payer: COMMERCIAL

## 2025-04-22 DIAGNOSIS — Q28.3 CEREBRAL CAVERNOUS MALFORMATION: ICD-10-CM

## 2025-04-22 DIAGNOSIS — R90.89 ABNORMAL BRAIN MRI: ICD-10-CM

## 2025-04-22 PROCEDURE — A9585 GADOBUTROL INJECTION: HCPCS | Performed by: NEUROLOGICAL SURGERY

## 2025-04-22 PROCEDURE — 70553 MRI BRAIN STEM W/O & W/DYE: CPT | Mod: TC

## 2025-04-22 PROCEDURE — 25500020 PHARM REV CODE 255: Performed by: NEUROLOGICAL SURGERY

## 2025-04-22 PROCEDURE — 70553 MRI BRAIN STEM W/O & W/DYE: CPT | Mod: 26,,, | Performed by: RADIOLOGY

## 2025-04-22 RX ORDER — GADOBUTROL 604.72 MG/ML
10 INJECTION INTRAVENOUS
Status: COMPLETED | OUTPATIENT
Start: 2025-04-22 | End: 2025-04-22

## 2025-04-22 RX ADMIN — GADOBUTROL 10 ML: 604.72 INJECTION INTRAVENOUS at 11:04

## 2025-04-24 NOTE — PROGRESS NOTES
The patient location is: [place]  The chief complaint leading to consultation is: 3 mo f/u cavernous malformation w/ MRI 4/22 & to discuss neurosurgical intervention    Visit type: audio    Face to Face time with patient: 15  35 minutes of total time spent on the encounter, which includes face to face time and non-face to face time preparing to see the patient (eg, review of tests), Obtaining and/or reviewing separately obtained history, Documenting clinical information in the electronic or other health record, Independently interpreting results (not separately reported) and communicating results to the patient/family/caregiver, or Care coordination (not separately reported).     Each patient to whom he or she provides medical services by telemedicine is:  (1) informed of the relationship between the physician and patient and the respective role of any other health care provider with respect to management of the patient; and (2) notified that he or she may decline to receive medical services by telemedicine and may withdraw from such care at any time.    Notes:   Subjective:   IBill, attest that this documentation has been prepared under the direction and in the presence of Will Davidson MD.     Patient ID: Joel Deluca is a 60 y.o. male     Chief Complaint: No chief complaint on file.    HPI  Mr. Joel Deluca is a 60 y.o. gentleman  with a h/o cervical disc disease with myelopathy, HLD, HTN, and cerebral cavernous malformation who presents to the NSGY clinic today for a 3 month f/u for cavernous malformation w MRI 4/22. At the time of his last visit on 1/7/2025, the patient had reported that he was in his normal state of health until several weeks before the visit in which he noticed double vision in the vertical direction. He became aware of this when he was out hunting. He reported that since that time it had remained stable. He reported that he had pretty good vision in each eye. He had been seen by his own  ophthalmologist who attempted to correct this with prisms, which did not work. He was wearing an eye patch at the time. He was present to discuss possible neurosurgical intervention    Today the patient reports that he is doing relatively well. I attempted to engage in a video audio call with the patient, but hardware issues led to only a audio call. Patient reports he is able to walk without falling down and has regained some of his balance. No other acute changes or issues to report.    Review of Systems   Constitutional:  Negative for activity change, appetite change, fatigue, fever and unexpected weight change.   HENT:  Negative for facial swelling.    Eyes: Negative.    Respiratory: Negative.     Cardiovascular: Negative.    Gastrointestinal:  Negative for diarrhea, nausea and vomiting.   Endocrine: Negative.    Genitourinary: Negative.    Musculoskeletal:  Negative for back pain, joint swelling, myalgias and neck pain.   Neurological:  Negative for dizziness, seizures, weakness, numbness and headaches.   Psychiatric/Behavioral: Negative.          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   Physical Exam  Constitutional:       General: He is not in acute distress.     Appearance: Normal appearance.   HENT:      Head: Normocephalic and atraumatic.   Neurological:      Mental Status: He is alert and oriented to person, place, and time.       IMAGING:  MRI Brain W WO Contrast, dated 4/22/2025:  Interval reduced sized fullness and FLAIR hyperintensity within the left dorsal midbrain/pontine cavernous malformation suggestive for evolution prior hemorrhage within residual cavernous malformation.  No evidence for new hemorrhage or  interval hemorrhage compared to most recent prior     There is stable separate cavernous malformation left brachium pontis also without evidence for interval recent hemorrhage     Associated developmental venous anomaly within the kim and extending to the left cerebellum again seen.     Remote operative change from left temporal craniotomy again seen     Continued hypertrophic olivary degeneration.    I have personally reviewed the images with the pt.      I, Dr. Will Davidson, personally performed the services described in this documentation. All medical record entries made by the scribe, Bill Carroll, were at my direction and in my presence.  I have reviewed the chart and agree that the record reflects my personal performance and is accurate and complete. Will Davidson MD. 4/25/2025    Assessment:       Cavernous malformation     Plan:   I have personally reviewed the MRI Brain W WO Contrast with the pt which shows interval reduced sized fullness and FLAIR hyperintensity within the left dorsal midbrain/pontine cavernous malformation suggestive for evolution prior hemorrhage within residual cavernous malformation.  No evidence for new hemorrhage or interval hemorrhage compared to most recent prior. There is stable separate cavernous malformation left brachium pontis also without evidence for interval recent hemorrhage. Associated developmental venous anomaly within the kim and extending to the left cerebellum again seen. Remote operative change from left temporal craniotomy again seen. Continued hypertrophic olivary degeneration.    Given the patient's imaging and discussion of improving symptoms, I will schedule the patient for a follow up in 1 year with repeat MRI Brain W WO contrast imaging.

## 2025-04-25 ENCOUNTER — OFFICE VISIT (OUTPATIENT)
Dept: RADIATION ONCOLOGY | Facility: CLINIC | Age: 60
End: 2025-04-25
Payer: COMMERCIAL

## 2025-04-25 DIAGNOSIS — Q28.3 CAVERNOUS MALFORMATION: Primary | ICD-10-CM

## 2025-04-25 NOTE — PATIENT INSTRUCTIONS
I have personally reviewed the MRI Brain W WO Contrast with the pt which shows interval reduced sized fullness and FLAIR hyperintensity within the left dorsal midbrain/pontine cavernous malformation suggestive for evolution prior hemorrhage within residual cavernous malformation.  No evidence for new hemorrhage or interval hemorrhage compared to most recent prior. There is stable separate cavernous malformation left brachium pontis also without evidence for interval recent hemorrhage. Associated developmental venous anomaly within the kim and extending to the left cerebellum again seen. Remote operative change from left temporal craniotomy again seen. Continued hypertrophic olivary degeneration.    Given the patient's imaging and discussion of improving symptoms, I will schedule the patient for a follow up in 1 year with repeat MRI Brain W WO contrast imaging.

## 2025-04-29 DIAGNOSIS — E78.5 HYPERLIPIDEMIA, UNSPECIFIED HYPERLIPIDEMIA TYPE: ICD-10-CM

## 2025-04-29 DIAGNOSIS — I10 ESSENTIAL HYPERTENSION: ICD-10-CM

## 2025-04-29 RX ORDER — FENOFIBRIC ACID 135 MG/1
1 CAPSULE, DELAYED RELEASE ORAL
Qty: 90 CAPSULE | Refills: 2 | Status: SHIPPED | OUTPATIENT
Start: 2025-04-29

## 2025-04-29 RX ORDER — ATORVASTATIN CALCIUM 20 MG/1
20 TABLET, FILM COATED ORAL
Qty: 90 TABLET | Refills: 2 | Status: SHIPPED | OUTPATIENT
Start: 2025-04-29

## 2025-04-29 RX ORDER — IRBESARTAN 300 MG/1
300 TABLET ORAL
Qty: 90 TABLET | Refills: 2 | Status: SHIPPED | OUTPATIENT
Start: 2025-04-29

## 2025-04-29 RX ORDER — PROPRANOLOL HYDROCHLORIDE 80 MG/1
80 CAPSULE, EXTENDED RELEASE ORAL
Qty: 90 CAPSULE | Refills: 3 | Status: SHIPPED | OUTPATIENT
Start: 2025-04-29

## 2025-04-29 NOTE — TELEPHONE ENCOUNTER
Refill Decision Note   Joel Deluca  is requesting a refill authorization.  Brief Assessment and Rationale for Refill:  Approve     Medication Therapy Plan:        Comments:     Note composed:1:28 PM 04/29/2025

## 2025-04-29 NOTE — TELEPHONE ENCOUNTER
No care due was identified.  [facility] Embedded Care Due Messages. Reference number: [id number].   4/29/2025 1:11:59 PM CDT

## 2025-08-27 ENCOUNTER — LAB VISIT (OUTPATIENT)
Dept: LAB | Facility: HOSPITAL | Age: 60
End: 2025-08-27
Attending: FAMILY MEDICINE
Payer: COMMERCIAL

## 2025-08-27 ENCOUNTER — OFFICE VISIT (OUTPATIENT)
Dept: INTERNAL MEDICINE | Facility: CLINIC | Age: 60
End: 2025-08-27
Payer: COMMERCIAL

## 2025-08-27 VITALS
WEIGHT: 203.69 LBS | DIASTOLIC BLOOD PRESSURE: 72 MMHG | OXYGEN SATURATION: 97 % | SYSTOLIC BLOOD PRESSURE: 128 MMHG | BODY MASS INDEX: 32.73 KG/M2 | RESPIRATION RATE: 18 BRPM | HEART RATE: 59 BPM | HEIGHT: 66 IN

## 2025-08-27 DIAGNOSIS — Z12.5 SCREENING FOR PROSTATE CANCER: Primary | ICD-10-CM

## 2025-08-27 DIAGNOSIS — I10 ESSENTIAL HYPERTENSION: ICD-10-CM

## 2025-08-27 DIAGNOSIS — E78.5 HYPERLIPIDEMIA, UNSPECIFIED HYPERLIPIDEMIA TYPE: ICD-10-CM

## 2025-08-27 DIAGNOSIS — G47.33 OSA (OBSTRUCTIVE SLEEP APNEA): ICD-10-CM

## 2025-08-27 DIAGNOSIS — Q28.3 CAVERNOUS MALFORMATION: ICD-10-CM

## 2025-08-27 DIAGNOSIS — Z12.5 SCREENING FOR PROSTATE CANCER: ICD-10-CM

## 2025-08-27 LAB
ALBUMIN SERPL BCP-MCNC: 4.3 G/DL (ref 3.5–5.2)
ALP SERPL-CCNC: 40 UNIT/L (ref 40–150)
ALT SERPL W/O P-5'-P-CCNC: 15 UNIT/L (ref 10–44)
ANION GAP (OHS): 11 MMOL/L (ref 8–16)
AST SERPL-CCNC: 22 UNIT/L (ref 11–45)
BILIRUB SERPL-MCNC: 0.5 MG/DL (ref 0.1–1)
BUN SERPL-MCNC: 27 MG/DL (ref 6–20)
CALCIUM SERPL-MCNC: 9.6 MG/DL (ref 8.7–10.5)
CHLORIDE SERPL-SCNC: 105 MMOL/L (ref 95–110)
CHOLEST SERPL-MCNC: 192 MG/DL (ref 120–199)
CHOLEST/HDLC SERPL: 5.3 {RATIO} (ref 2–5)
CO2 SERPL-SCNC: 25 MMOL/L (ref 23–29)
CREAT SERPL-MCNC: 1 MG/DL (ref 0.5–1.4)
GFR SERPLBLD CREATININE-BSD FMLA CKD-EPI: >60 ML/MIN/1.73/M2
GLUCOSE SERPL-MCNC: 99 MG/DL (ref 70–110)
HDLC SERPL-MCNC: 36 MG/DL (ref 40–75)
HDLC SERPL: 18.8 % (ref 20–50)
LDLC SERPL CALC-MCNC: 128.2 MG/DL (ref 63–159)
NONHDLC SERPL-MCNC: 156 MG/DL
POTASSIUM SERPL-SCNC: 4.1 MMOL/L (ref 3.5–5.1)
PROT SERPL-MCNC: 7.8 GM/DL (ref 6–8.4)
PSA SERPL-MCNC: 1.93 NG/ML
SODIUM SERPL-SCNC: 141 MMOL/L (ref 136–145)
TRIGL SERPL-MCNC: 139 MG/DL (ref 30–150)

## 2025-08-27 PROCEDURE — 84153 ASSAY OF PSA TOTAL: CPT

## 2025-08-27 PROCEDURE — 99214 OFFICE O/P EST MOD 30 MIN: CPT | Mod: S$GLB,,, | Performed by: FAMILY MEDICINE

## 2025-08-27 PROCEDURE — 80053 COMPREHEN METABOLIC PANEL: CPT

## 2025-08-27 PROCEDURE — 36415 COLL VENOUS BLD VENIPUNCTURE: CPT

## 2025-08-27 PROCEDURE — 99999 PR PBB SHADOW E&M-EST. PATIENT-LVL III: CPT | Mod: PBBFAC,,, | Performed by: FAMILY MEDICINE

## 2025-08-27 PROCEDURE — 80061 LIPID PANEL: CPT

## 2025-08-27 RX ORDER — PROPRANOLOL HYDROCHLORIDE 80 MG/1
80 CAPSULE, EXTENDED RELEASE ORAL DAILY
Qty: 100 CAPSULE | Refills: 1 | Status: SHIPPED | OUTPATIENT
Start: 2025-08-27

## 2025-08-27 RX ORDER — ATORVASTATIN CALCIUM 20 MG/1
20 TABLET, FILM COATED ORAL DAILY
Qty: 100 TABLET | Refills: 1 | Status: SHIPPED | OUTPATIENT
Start: 2025-08-27

## 2025-08-27 RX ORDER — FENOFIBRIC ACID 135 MG/1
1 CAPSULE, DELAYED RELEASE ORAL DAILY
Qty: 100 CAPSULE | Refills: 1 | Status: SHIPPED | OUTPATIENT
Start: 2025-08-27

## 2025-08-27 RX ORDER — IRBESARTAN 300 MG/1
300 TABLET ORAL DAILY
Qty: 100 TABLET | Refills: 1 | Status: SHIPPED | OUTPATIENT
Start: 2025-08-27

## (undated) DEVICE — DRESSING TRANS 4X4 TEGADERM

## (undated) DEVICE — RUBBERBAND STERILE 3X1/8IN

## (undated) DEVICE — TRAY LUMBAR PUNCTURE ADULT

## (undated) DEVICE — DRESSING SURGICAL 1X3

## (undated) DEVICE — HOOK LONE STAR BLUNT 12MM

## (undated) DEVICE — SPRAY MASTISOL

## (undated) DEVICE — DURASEAL

## (undated) DEVICE — DRAPE STERI INSTRUMENT 1018

## (undated) DEVICE — DRESSING TELFA STRL 4X3 LF

## (undated) DEVICE — DRAPE OPMI STERILE

## (undated) DEVICE — DRESSING SURGICAL 1X1

## (undated) DEVICE — KIT SURGIFLO HEMOSTATIC MATRIX

## (undated) DEVICE — SEE MEDLINE ITEM 152622

## (undated) DEVICE — DRAPE C ARM 42 X 120 10/BX

## (undated) DEVICE — SEE MEDLINE ITEM 157150

## (undated) DEVICE — ELECTRODE REM PLYHSV RETURN 9

## (undated) DEVICE — Device

## (undated) DEVICE — SUT VICRYL PLUS 3-0 SH 18IN

## (undated) DEVICE — DRAPE INCISE IOBAN 2 23X17IN

## (undated) DEVICE — KIT EVACUATOR 3-SPRING 1/8 DRN

## (undated) DEVICE — ROUTER TAPERED 2.3MM

## (undated) DEVICE — STOCKINET 4INX48

## (undated) DEVICE — SEE MEDLINE ITEM 156905

## (undated) DEVICE — TUBE FRAZIER 5MM 2FT SOFT TIP

## (undated) DEVICE — DRESSING TELFA N ADH 3X8

## (undated) DEVICE — SYR SLIP TIP 1CC

## (undated) DEVICE — TAPE SURG MEDIPORE 6X72IN

## (undated) DEVICE — STAPLER SKIN PROXIMATE WIDE

## (undated) DEVICE — SPONGE NEURO 1/4X1/4

## (undated) DEVICE — MICRO FEATHER BLADE

## (undated) DEVICE — WARMER DRAPE STERILE LF

## (undated) DEVICE — DRESSING SURGICAL 1/2X1/2

## (undated) DEVICE — CONTAINER SPECIMEN STRL 4OZ

## (undated) DEVICE — DIFFUSER

## (undated) DEVICE — BUR BONE CUT MICRO TPS 3X3.8MM

## (undated) DEVICE — CORD BIPOLAR 12 FOOT

## (undated) DEVICE — HOOK STAY ELAS 5MM 8EA/PK

## (undated) DEVICE — CLIPS RANEY SCALP FFS/ASSY

## (undated) DEVICE — SUT 2-0 SILK 30IN BLK BRAID

## (undated) DEVICE — KIT EXTERNAL DRAIN(CRAINIAL)

## (undated) DEVICE — SEE MEDLINE ITEM 146313

## (undated) DEVICE — APPLICATOR MICROMYST

## (undated) DEVICE — DURAPREP SURG SCRUB 26ML

## (undated) DEVICE — MARKER SKIN STND TIP BLUE BARR

## (undated) DEVICE — SEE MEDLINE ITEM 157103

## (undated) DEVICE — HEMOSTAT SURGICEL 4X8IN

## (undated) DEVICE — SEE MEDLINE ITEM 146292

## (undated) DEVICE — TRAY FOLEY 16FR INFECTION CONT

## (undated) DEVICE — CARTRIDGE OIL

## (undated) DEVICE — BIT DRILL WIRE PASS 1.0MM

## (undated) DEVICE — KIT SURGIFLO EVITHROM

## (undated) DEVICE — SUT 4/0 18IN NUROLON BLK B

## (undated) DEVICE — PACK SET UP CONVERTORS